# Patient Record
Sex: FEMALE | Race: WHITE | NOT HISPANIC OR LATINO | Employment: FULL TIME | URBAN - METROPOLITAN AREA
[De-identification: names, ages, dates, MRNs, and addresses within clinical notes are randomized per-mention and may not be internally consistent; named-entity substitution may affect disease eponyms.]

---

## 2018-10-11 ENCOUNTER — APPOINTMENT (OUTPATIENT)
Dept: GENERAL RADIOLOGY | Facility: HOSPITAL | Age: 33
End: 2018-10-11

## 2018-10-11 ENCOUNTER — HOSPITAL ENCOUNTER (EMERGENCY)
Facility: HOSPITAL | Age: 33
Discharge: HOME | End: 2018-10-11
Attending: EMERGENCY MEDICINE | Admitting: EMERGENCY MEDICINE

## 2018-10-11 ENCOUNTER — APPOINTMENT (OUTPATIENT)
Dept: CT IMAGING | Facility: HOSPITAL | Age: 33
End: 2018-10-11

## 2018-10-11 VITALS
BODY MASS INDEX: 18.64 KG/M2 | TEMPERATURE: 98.8 F | HEIGHT: 68 IN | HEART RATE: 79 BPM | RESPIRATION RATE: 18 BRPM | WEIGHT: 123 LBS | OXYGEN SATURATION: 95 % | SYSTOLIC BLOOD PRESSURE: 102 MMHG | DIASTOLIC BLOOD PRESSURE: 56 MMHG

## 2018-10-11 DIAGNOSIS — S16.1XXA STRAIN OF NECK MUSCLE, INITIAL ENCOUNTER: ICD-10-CM
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
## 2018-10-11 DIAGNOSIS — S50.01XA CONTUSION OF RIGHT ELBOW, INITIAL ENCOUNTER: ICD-10-CM

## 2018-10-11 DIAGNOSIS — S00.83XA CONTUSION OF FOREHEAD, INITIAL ENCOUNTER: Primary | ICD-10-CM

## 2018-10-11 PROCEDURE — 99284 EMERGENCY DEPT VISIT MOD MDM: CPT

## 2018-10-11 PROCEDURE — 25010000002 TDAP 5-2.5-18.5 LF-MCG/0.5 SUSPENSION: Performed by: PHYSICIAN ASSISTANT

## 2018-10-11 PROCEDURE — 72050 X-RAY EXAM NECK SPINE 4/5VWS: CPT

## 2018-10-11 PROCEDURE — 70450 CT HEAD/BRAIN W/O DYE: CPT

## 2018-10-11 PROCEDURE — 90715 TDAP VACCINE 7 YRS/> IM: CPT | Performed by: PHYSICIAN ASSISTANT

## 2018-10-11 PROCEDURE — 73070 X-RAY EXAM OF ELBOW: CPT

## 2018-10-11 PROCEDURE — 90471 IMMUNIZATION ADMIN: CPT | Performed by: PHYSICIAN ASSISTANT

## 2018-10-11 RX ORDER — ALBUTEROL SULFATE 90 UG/1
2 AEROSOL, METERED RESPIRATORY (INHALATION) EVERY 4 HOURS PRN
COMMUNITY
End: 2022-08-18

## 2018-10-11 RX ORDER — TRAZODONE HYDROCHLORIDE 150 MG/1
150 TABLET ORAL NIGHTLY
COMMUNITY
End: 2022-08-18

## 2018-10-11 RX ORDER — IBUPROFEN 200 MG
800 TABLET ORAL ONCE
Status: COMPLETED | OUTPATIENT
Start: 2018-10-11 | End: 2018-10-11

## 2018-10-11 RX ORDER — BUPROPION HYDROCHLORIDE 100 MG/1
100 TABLET ORAL 2 TIMES DAILY
COMMUNITY
End: 2022-08-18

## 2018-10-11 RX ADMIN — TETANUS TOXOID, REDUCED DIPHTHERIA TOXOID AND ACELLULAR PERTUSSIS VACCINE, ADSORBED 0.5 ML: 5; 2.5; 8; 8; 2.5 SUSPENSION INTRAMUSCULAR at 15:25

## 2018-10-11 RX ADMIN — IBUPROFEN 800 MG: 200 TABLET, FILM COATED ORAL at 15:24
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
## 2019-05-20 ENCOUNTER — HOSPITAL ENCOUNTER (OUTPATIENT)
Dept: GENERAL RADIOLOGY | Facility: HOSPITAL | Age: 34
Discharge: HOME | End: 2019-05-20

## 2019-05-27 ENCOUNTER — HOSPITAL ENCOUNTER (OUTPATIENT)
Dept: URGENT CARE | Facility: CLINIC | Age: 34
Discharge: HOME | End: 2019-05-27
Attending: FAMILY MEDICINE
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
## 2019-07-23 ENCOUNTER — HOSPITAL ENCOUNTER (OUTPATIENT)
Dept: GENERAL RADIOLOGY | Facility: HOSPITAL | Age: 34
Discharge: HOME | End: 2019-07-23

## 2021-10-21 ENCOUNTER — LAB (OUTPATIENT)
Dept: LAB | Facility: HOSPITAL | Age: 36
End: 2021-10-21

## 2021-10-21 ENCOUNTER — TRANSCRIBE ORDERS (OUTPATIENT)
Dept: LAB | Facility: HOSPITAL | Age: 36
End: 2021-10-21

## 2021-10-21 DIAGNOSIS — B18.2 CHRONIC HEPATITIS C WITH HEPATIC COMA (HCC): Primary | ICD-10-CM

## 2021-10-21 DIAGNOSIS — B18.2 CHRONIC HEPATITIS C WITH HEPATIC COMA (HCC): ICD-10-CM

## 2021-10-21 LAB
ALBUMIN SERPL-MCNC: 4.8 G/DL (ref 3.5–5.2)
ALBUMIN/GLOB SERPL: 1.7 G/DL
ALP SERPL-CCNC: 107 U/L (ref 39–117)
ALT SERPL W P-5'-P-CCNC: 33 U/L (ref 1–33)
ANION GAP SERPL CALCULATED.3IONS-SCNC: 7.3 MMOL/L (ref 5–15)
AST SERPL-CCNC: 19 U/L (ref 1–32)
BILIRUB SERPL-MCNC: 0.4 MG/DL (ref 0–1.2)
BUN SERPL-MCNC: 7 MG/DL (ref 6–20)
BUN/CREAT SERPL: 6 (ref 7–25)
CALCIUM SPEC-SCNC: 11.3 MG/DL (ref 8.6–10.5)
CHLORIDE SERPL-SCNC: 106 MMOL/L (ref 98–107)
CO2 SERPL-SCNC: 24.7 MMOL/L (ref 22–29)
CREAT SERPL-MCNC: 1.17 MG/DL (ref 0.57–1)
GFR SERPL CREATININE-BSD FRML MDRD: 53 ML/MIN/1.73
GLOBULIN UR ELPH-MCNC: 2.8 GM/DL
GLUCOSE SERPL-MCNC: 92 MG/DL (ref 65–99)
POTASSIUM SERPL-SCNC: 3.9 MMOL/L (ref 3.5–5.2)
PROT SERPL-MCNC: 7.6 G/DL (ref 6–8.5)
SODIUM SERPL-SCNC: 138 MMOL/L (ref 136–145)

## 2021-10-21 PROCEDURE — 36415 COLL VENOUS BLD VENIPUNCTURE: CPT

## 2021-10-21 PROCEDURE — 87522 HEPATITIS C REVRS TRNSCRPJ: CPT

## 2021-10-21 PROCEDURE — 80053 COMPREHEN METABOLIC PANEL: CPT

## 2021-10-21 PROCEDURE — 86704 HEP B CORE ANTIBODY TOTAL: CPT

## 2021-10-22 LAB — HBV CORE AB SERPL QL IA: NEGATIVE

## 2021-10-24 LAB
HCV RNA SERPL NAA+PROBE-ACNC: NORMAL IU/ML
TEST INFORMATION: NORMAL

## 2022-04-18 ENCOUNTER — LAB (OUTPATIENT)
Dept: LAB | Facility: HOSPITAL | Age: 37
End: 2022-04-18

## 2022-04-18 ENCOUNTER — TRANSCRIBE ORDERS (OUTPATIENT)
Dept: ADMINISTRATIVE | Facility: HOSPITAL | Age: 37
End: 2022-04-18

## 2022-04-18 DIAGNOSIS — Z79.899 ENCOUNTER FOR LONG-TERM (CURRENT) USE OF OTHER MEDICATIONS: Primary | ICD-10-CM

## 2022-04-18 DIAGNOSIS — Z79.899 ENCOUNTER FOR LONG-TERM (CURRENT) USE OF OTHER MEDICATIONS: ICD-10-CM

## 2022-04-18 LAB
ALBUMIN SERPL-MCNC: 4.3 G/DL (ref 3.5–5.2)
ALBUMIN/GLOB SERPL: 1.3 G/DL
ALP SERPL-CCNC: 114 U/L (ref 39–117)
ALT SERPL W P-5'-P-CCNC: 19 U/L (ref 1–33)
ANION GAP SERPL CALCULATED.3IONS-SCNC: 8.8 MMOL/L (ref 5–15)
AST SERPL-CCNC: 15 U/L (ref 1–32)
BASOPHILS # BLD AUTO: 0.05 10*3/MM3 (ref 0–0.2)
BASOPHILS NFR BLD AUTO: 0.7 % (ref 0–1.5)
BILIRUB SERPL-MCNC: 0.2 MG/DL (ref 0–1.2)
BUN SERPL-MCNC: 6 MG/DL (ref 6–20)
BUN/CREAT SERPL: 4.7 (ref 7–25)
CALCIUM SPEC-SCNC: 11.8 MG/DL (ref 8.6–10.5)
CHLORIDE SERPL-SCNC: 107 MMOL/L (ref 98–107)
CHOLEST SERPL-MCNC: 134 MG/DL (ref 0–200)
CO2 SERPL-SCNC: 24.2 MMOL/L (ref 22–29)
CREAT SERPL-MCNC: 1.27 MG/DL (ref 0.57–1)
DEPRECATED RDW RBC AUTO: 42.3 FL (ref 37–54)
EGFRCR SERPLBLD CKD-EPI 2021: 56.7 ML/MIN/1.73
EOSINOPHIL # BLD AUTO: 0.16 10*3/MM3 (ref 0–0.4)
EOSINOPHIL NFR BLD AUTO: 2.3 % (ref 0.3–6.2)
ERYTHROCYTE [DISTWIDTH] IN BLOOD BY AUTOMATED COUNT: 12.9 % (ref 12.3–15.4)
GLOBULIN UR ELPH-MCNC: 3.2 GM/DL
GLUCOSE SERPL-MCNC: 56 MG/DL (ref 65–99)
HBA1C MFR BLD: 4.8 % (ref 4.8–5.6)
HCT VFR BLD AUTO: 37.7 % (ref 34–46.6)
HDLC SERPL-MCNC: 37 MG/DL (ref 40–60)
HGB BLD-MCNC: 12.5 G/DL (ref 12–15.9)
IMM GRANULOCYTES # BLD AUTO: 0.03 10*3/MM3 (ref 0–0.05)
IMM GRANULOCYTES NFR BLD AUTO: 0.4 % (ref 0–0.5)
LDLC SERPL CALC-MCNC: 84 MG/DL (ref 0–100)
LDLC/HDLC SERPL: 2.28 {RATIO}
LITHIUM SERPL-SCNC: 0.8 MMOL/L (ref 0.6–1.2)
LYMPHOCYTES # BLD AUTO: 1.58 10*3/MM3 (ref 0.7–3.1)
LYMPHOCYTES NFR BLD AUTO: 22.6 % (ref 19.6–45.3)
MCH RBC QN AUTO: 30.2 PG (ref 26.6–33)
MCHC RBC AUTO-ENTMCNC: 33.2 G/DL (ref 31.5–35.7)
MCV RBC AUTO: 91.1 FL (ref 79–97)
MONOCYTES # BLD AUTO: 0.58 10*3/MM3 (ref 0.1–0.9)
MONOCYTES NFR BLD AUTO: 8.3 % (ref 5–12)
NEUTROPHILS NFR BLD AUTO: 4.59 10*3/MM3 (ref 1.7–7)
NEUTROPHILS NFR BLD AUTO: 65.7 % (ref 42.7–76)
NRBC BLD AUTO-RTO: 0 /100 WBC (ref 0–0.2)
PLATELET # BLD AUTO: 422 10*3/MM3 (ref 140–450)
PMV BLD AUTO: 9.1 FL (ref 6–12)
POTASSIUM SERPL-SCNC: 4.2 MMOL/L (ref 3.5–5.2)
PROT SERPL-MCNC: 7.5 G/DL (ref 6–8.5)
RBC # BLD AUTO: 4.14 10*6/MM3 (ref 3.77–5.28)
SODIUM SERPL-SCNC: 140 MMOL/L (ref 136–145)
T4 FREE SERPL-MCNC: 0.93 NG/DL (ref 0.93–1.7)
TRIGL SERPL-MCNC: 63 MG/DL (ref 0–150)
TSH SERPL DL<=0.05 MIU/L-ACNC: 5.5 UIU/ML (ref 0.27–4.2)
VLDLC SERPL-MCNC: 13 MG/DL (ref 5–40)
WBC NRBC COR # BLD: 6.99 10*3/MM3 (ref 3.4–10.8)

## 2022-04-18 PROCEDURE — 80061 LIPID PANEL: CPT

## 2022-04-18 PROCEDURE — 80053 COMPREHEN METABOLIC PANEL: CPT

## 2022-04-18 PROCEDURE — 84439 ASSAY OF FREE THYROXINE: CPT

## 2022-04-18 PROCEDURE — 85025 COMPLETE CBC W/AUTO DIFF WBC: CPT

## 2022-04-18 PROCEDURE — 83036 HEMOGLOBIN GLYCOSYLATED A1C: CPT

## 2022-04-18 PROCEDURE — 84443 ASSAY THYROID STIM HORMONE: CPT

## 2022-04-18 PROCEDURE — 80178 ASSAY OF LITHIUM: CPT

## 2022-04-18 PROCEDURE — 36415 COLL VENOUS BLD VENIPUNCTURE: CPT

## 2022-08-17 PROBLEM — F41.9 ANXIETY: Status: ACTIVE | Noted: 2021-03-11

## 2022-08-17 PROBLEM — G47.09 OTHER INSOMNIA: Status: ACTIVE | Noted: 2020-10-31

## 2022-08-17 PROBLEM — F10.10 ALCOHOL ABUSE: Status: ACTIVE | Noted: 2020-10-08

## 2022-08-17 PROBLEM — F19.10 SUBSTANCE ABUSE: Status: ACTIVE | Noted: 2020-10-08

## 2022-08-17 PROBLEM — F31.62 BIPOLAR DISORDER, CURRENT EPISODE MIXED, MODERATE: Status: ACTIVE | Noted: 2020-10-08

## 2022-08-17 PROBLEM — Z72.0 TOBACCO ABUSE: Status: ACTIVE | Noted: 2020-10-08

## 2022-08-17 PROBLEM — B18.2 CHRONIC HEPATITIS C WITHOUT HEPATIC COMA (HCC): Status: ACTIVE | Noted: 2020-10-13

## 2022-08-17 PROBLEM — F11.10 HEROIN ABUSE: Status: ACTIVE | Noted: 2020-10-08

## 2022-08-18 ENCOUNTER — OFFICE VISIT (OUTPATIENT)
Dept: FAMILY MEDICINE CLINIC | Facility: CLINIC | Age: 37
End: 2022-08-18

## 2022-08-18 ENCOUNTER — LAB (OUTPATIENT)
Dept: LAB | Facility: HOSPITAL | Age: 37
End: 2022-08-18

## 2022-08-18 VITALS
OXYGEN SATURATION: 100 % | DIASTOLIC BLOOD PRESSURE: 59 MMHG | HEIGHT: 68 IN | BODY MASS INDEX: 21.67 KG/M2 | SYSTOLIC BLOOD PRESSURE: 95 MMHG | HEART RATE: 69 BPM | WEIGHT: 143 LBS

## 2022-08-18 DIAGNOSIS — N92.6 MISSED MENSES: Primary | ICD-10-CM

## 2022-08-18 DIAGNOSIS — N92.6 MISSED MENSES: ICD-10-CM

## 2022-08-18 DIAGNOSIS — Z76.89 ENCOUNTER TO ESTABLISH CARE: ICD-10-CM

## 2022-08-18 DIAGNOSIS — Z34.90 PREGNANCY, UNSPECIFIED GESTATIONAL AGE: ICD-10-CM

## 2022-08-18 LAB
B-HCG UR QL: POSITIVE
EXPIRATION DATE: ABNORMAL
HCG INTACT+B SERPL-ACNC: NORMAL MIU/ML
HCG SERPL QL: POSITIVE
INTERNAL NEGATIVE CONTROL: ABNORMAL
INTERNAL POSITIVE CONTROL: ABNORMAL
Lab: ABNORMAL

## 2022-08-18 PROCEDURE — 84703 CHORIONIC GONADOTROPIN ASSAY: CPT

## 2022-08-18 PROCEDURE — 99203 OFFICE O/P NEW LOW 30 MIN: CPT | Performed by: NURSE PRACTITIONER

## 2022-08-18 PROCEDURE — 36415 COLL VENOUS BLD VENIPUNCTURE: CPT

## 2022-08-18 PROCEDURE — 84702 CHORIONIC GONADOTROPIN TEST: CPT

## 2022-08-18 PROCEDURE — 81025 URINE PREGNANCY TEST: CPT | Performed by: NURSE PRACTITIONER

## 2022-08-18 RX ORDER — BUPROPION HYDROCHLORIDE 150 MG/1
150 TABLET ORAL DAILY
COMMUNITY
Start: 2022-07-26 | End: 2022-10-11

## 2022-08-18 RX ORDER — BUPROPION HYDROCHLORIDE 300 MG/1
300 TABLET ORAL DAILY
COMMUNITY
Start: 2022-07-26 | End: 2022-10-11

## 2022-08-18 NOTE — PROGRESS NOTES
Chief Complaint  Amenorrhea, depression    Subjective            Aubree Krueger presents to [facility]  Pt is here to establish care. Previous PCP was a MD in [place], she will sign for records today.    Pt has c/o possible pregnancy. Pt states her LMP was mid to end of June. Pt has taken pregnancy tests at home and were positive. Pt would like a referral to OBGYN.    Pt sees [facility] for PSYCH and med mgmt.    Had history of  Hep C and pt reports she has finished treatment at [facility] after being there for about 6 months.  Notes in chart.  PT denies any other significant past medical history.    She reports two prior pregnancies and two live births.        Past Medical History:   Diagnosis Date   • Depression    • Substance abuse ([facility]) 2007       Allergies   Allergen Reactions   • Sulfa Antibiotics Anaphylaxis        Past Surgical History:   Procedure Laterality Date   • KNEE SURGERY Left     2 surgeries        Social History     Tobacco Use   • Smoking status: Current Every Day Smoker     Packs/day: 1.00     Years: 10.00     Pack years: 10.00     Types: Cigarettes   • Smokeless tobacco: Never Used   • Tobacco comment: Workin on quittting   Substance Use Topics   • Alcohol use: No       No family history on file.     Current Outpatient Medications on File Prior to Visit   Medication Sig   • buPROPion XL (WELLBUTRIN XL) 150 MG 24 hr tablet Take 150 mg by mouth Daily.   • buPROPion XL (WELLBUTRIN XL) 300 MG 24 hr tablet Take 300 mg by mouth Daily.   • [DISCONTINUED] albuterol (PROVENTIL HFA;VENTOLIN HFA) 108 (90 Base) MCG/ACT inhaler Inhale 2 puffs Every 4 (Four) Hours As Needed for Wheezing.   • [DISCONTINUED] buPROPion (WELLBUTRIN) 100 MG tablet Take 100 mg by mouth 2 (Two) Times a Day.   • [DISCONTINUED] traZODone (DESYREL) 150 MG tablet Take 150 mg by mouth Every Night.     No current facility-administered medications on file prior to visit.       [facility]  "Maintenance Due   Topic Date Due   • ANNUAL PHYSICAL  Never done       Objective     BP 95/59   Pulse 69   Ht 172.7 cm (68\")   Wt 64.9 kg (143 lb)   SpO2 100%   BMI 21.74 kg/m²       Physical Exam  Constitutional:       General: She is not in acute distress.     Appearance: Normal appearance. She is not ill-appearing.   HENT:      Head: Normocephalic and atraumatic.   Cardiovascular:      Rate and Rhythm: Normal rate and regular rhythm.      Heart sounds: Normal heart sounds. No murmur heard.  Pulmonary:      Effort: Pulmonary effort is normal. No respiratory distress.      Breath sounds: Normal breath sounds.   Chest:      Chest wall: No tenderness.   Abdominal:      General: Abdomen is flat. Bowel sounds are normal. There is no distension.      Palpations: Abdomen is soft. There is no mass.      Tenderness: There is no abdominal tenderness. There is no guarding.   Musculoskeletal:         General: No swelling or tenderness. Normal range of motion.      Cervical back: Normal range of motion and neck supple.   Skin:     General: Skin is warm and dry.      Findings: No rash.   Neurological:      General: No focal deficit present.      Mental Status: She is alert and oriented to person, place, and time. Mental status is at baseline.      Gait: Gait normal.   Psychiatric:         Mood and Affect: Mood normal.         Behavior: Behavior normal.         Thought Content: Thought content normal.         Judgment: Judgment normal.           Result Review :                           Assessment and Plan        Diagnoses and all orders for this visit:    1. Missed menses (Primary)  -     POCT pregnancy, urine  -     hCG, Quantitative, Pregnancy; Future  -     hCG, Serum, Qualitative; Future  -     Ambulatory Referral to Gynecology    2. Encounter to establish care    3. Pregnancy, unspecified gestational age  Comments:  advised  pt to start prenatal vitamins  Orders:  -     Ambulatory Referral to " Gynecology              Follow Up     Return in about 1 year (around 8/18/2023), or if symptoms worsen or fail to improve.    Patient was given instructions and counseling regarding her condition or for health maintenance advice. Please see specific information pulled into the AVS if appropriate.

## 2022-08-24 ENCOUNTER — PATIENT ROUNDING (BHMG ONLY) (OUTPATIENT)
Dept: FAMILY MEDICINE CLINIC | Facility: CLINIC | Age: 37
End: 2022-08-24

## 2022-08-24 NOTE — PROGRESS NOTES
A [employer] message has been sent to the patient for PATIENT ROUNDING with [facility].

## 2022-09-13 ENCOUNTER — PATIENT ROUNDING (BHMG ONLY) (OUTPATIENT)
Dept: OBSTETRICS AND GYNECOLOGY | Age: 37
End: 2022-09-13

## 2022-09-13 ENCOUNTER — OFFICE VISIT (OUTPATIENT)
Dept: OBSTETRICS AND GYNECOLOGY | Age: 37
End: 2022-09-13

## 2022-09-13 VITALS
WEIGHT: 139 LBS | HEIGHT: 68 IN | BODY MASS INDEX: 21.07 KG/M2 | SYSTOLIC BLOOD PRESSURE: 122 MMHG | DIASTOLIC BLOOD PRESSURE: 70 MMHG

## 2022-09-13 DIAGNOSIS — F11.10 HEROIN ABUSE: ICD-10-CM

## 2022-09-13 DIAGNOSIS — O09.529 ANTEPARTUM MULTIGRAVIDA OF ADVANCED MATERNAL AGE: ICD-10-CM

## 2022-09-13 DIAGNOSIS — F10.10 ALCOHOL ABUSE: Primary | ICD-10-CM

## 2022-09-13 DIAGNOSIS — B18.2 CHRONIC HEPATITIS C WITHOUT HEPATIC COMA: ICD-10-CM

## 2022-09-13 DIAGNOSIS — F41.9 ANXIETY: ICD-10-CM

## 2022-09-13 DIAGNOSIS — Z72.0 TOBACCO ABUSE: ICD-10-CM

## 2022-09-13 LAB
BILIRUB BLD-MCNC: NEGATIVE MG/DL
CLARITY, POC: CLEAR
COLOR UR: YELLOW
GLUCOSE UR STRIP-MCNC: NEGATIVE MG/DL
KETONES UR QL: NEGATIVE
LEUKOCYTE EST, POC: ABNORMAL
NITRITE UR-MCNC: NEGATIVE MG/ML
PH UR: 6.5 [PH] (ref 5–8)
PROT UR STRIP-MCNC: NEGATIVE MG/DL
RBC # UR STRIP: NEGATIVE /UL
SP GR UR: 1.01 (ref 1–1.03)
UROBILINOGEN UR QL: NORMAL

## 2022-09-13 PROCEDURE — 99204 OFFICE O/P NEW MOD 45 MIN: CPT | Performed by: OBSTETRICS & GYNECOLOGY

## 2022-09-13 RX ORDER — PRENATAL VIT NO.126/IRON/FOLIC 28MG-0.8MG
TABLET ORAL DAILY
COMMUNITY

## 2022-09-13 NOTE — PROGRESS NOTES
Subjective       History of Present Illness  Aubree Krueger is a 37 y.o. female is being seen today for irregular menses possible pregnancy  Chief Complaint   Patient presents with   • Initial Prenatal Visit     NOB:lmp ?  U/s today,EDC 4/5/23   .        The following portions of the patient's history were reviewed and updated as appropriate: allergies, current medications, past family history, past medical history, past social history, past surgical history and problem list.    PAST MEDICAL HISTORY  Past Medical History:   Diagnosis Date   • Depression    • Substance abuse (HCC) 2007     OB History   No obstetric history on file.     Past Surgical History:   Procedure Laterality Date   • KNEE SURGERY Left     2 surgeries     No family history on file.  Social History     Tobacco Use   Smoking Status Current Every Day Smoker   • Packs/day: 1.00   • Years: 10.00   • Pack years: 10.00   • Types: Cigarettes   Smokeless Tobacco Never Used   Tobacco Comment    Workin on quittting       Current Outpatient Medications:   •  prenatal vitamin (prenatal, CLASSIC, vitamin) tablet, Take  by mouth Daily., Disp: , Rfl:   •  buPROPion XL (WELLBUTRIN XL) 150 MG 24 hr tablet, Take 150 mg by mouth Daily., Disp: , Rfl:   •  buPROPion XL (WELLBUTRIN XL) 300 MG 24 hr tablet, Take 300 mg by mouth Daily., Disp: , Rfl:   Immunization History   Administered Date(s) Administered   • COVID-19 (MODERNA) 1st, 2nd, 3rd Dose Only 03/18/2021, 04/15/2021   • Fluzone Quad >6mos (Multi-dose) 11/19/2020   • Hepatitis A 12/31/2020   • PPD Test 10/12/2020   • Tdap 10/11/2018       Review of Systems       Except as outlined in history of physical illness, patient denies any changes in her GYN, , GI systems. All other systems reviewed are negative.    Objective   Physical Exam   Alert and oriented, respirations unlabored, heart regular rate and rhythm   Pelvic external genitalia normal female vagina clean dry intact cervix without discharge nontender  uterus 10 to 11 weeks size adnexa nonenlarged nontender      Assessment & Plan   Diagnoses and all orders for this visit:    1. Alcohol abuse (Primary)  -     OB Panel With HIV  -     Progesterone  -     HCG, B-subunit, Quantitative  -     Hepatitis C Antibody  -     Hepatitis C RNA, Quantitative, PCR (graph)  -     IGP,CtNgTv,rfx Aptima HPV ASCU  -     Urine Culture - Urine, Urine, Clean Catch  -     POC Urinalysis Dipstick    2. Anxiety  -     OB Panel With HIV  -     Progesterone  -     HCG, B-subunit, Quantitative  -     Hepatitis C Antibody  -     Hepatitis C RNA, Quantitative, PCR (graph)  -     IGP,CtNgTv,rfx Aptima HPV ASCU  -     Urine Culture - Urine, Urine, Clean Catch  -     POC Urinalysis Dipstick    3. Chronic hepatitis C without hepatic coma (HCC)  -     OB Panel With HIV  -     Progesterone  -     HCG, B-subunit, Quantitative  -     Hepatitis C Antibody  -     Hepatitis C RNA, Quantitative, PCR (graph)  -     IGP,CtNgTv,rfx Aptima HPV ASCU  -     Urine Culture - Urine, Urine, Clean Catch  -     POC Urinalysis Dipstick    4. Heroin abuse (HCC)  -     OB Panel With HIV  -     Progesterone  -     HCG, B-subunit, Quantitative  -     Hepatitis C Antibody  -     Hepatitis C RNA, Quantitative, PCR (graph)  -     IGP,CtNgTv,rfx Aptima HPV ASCU  -     Urine Culture - Urine, Urine, Clean Catch  -     POC Urinalysis Dipstick    5. Tobacco abuse  -     OB Panel With HIV  -     Progesterone  -     HCG, B-subunit, Quantitative  -     Hepatitis C Antibody  -     Hepatitis C RNA, Quantitative, PCR (graph)  -     IGP,CtNgTv,rfx Aptima HPV ASCU  -     Urine Culture - Urine, Urine, Clean Catch  -     POC Urinalysis Dipstick    6. Antepartum multigravida of advanced maternal age  -     OB Panel With HIV  -     Progesterone  -     HCG, B-subunit, Quantitative  -     Hepatitis C Antibody  -     Hepatitis C RNA, Quantitative, PCR (graph)  -     IGP,CtNgTv,rfx Aptima HPV ASCU  -     Urine Culture - Urine, Urine, Clean  Catch  -     POC Urinalysis Dipstick    Today's ultrasound consistent with 10 weeks 6 days gestation  Positive cardiac activity 165 beats a minute  Reviewed multiple medical issues concerns treatments and abstinence from illicit drugs as well as tobacco cessation-patient states she has been clean and sober for 2 years  Previous hep C RNA load was negative we will recheck again today  BiPolar disorder managed by Dr. Elizabeth Davey in [facility] I have recommended follow-up appointment as patient has not been on medicines for couple months  Patient voices understanding               Orders Placed This Encounter   Procedures   • Urine Culture - Urine, Urine, Clean Catch   • OB Panel With HIV     Order Specific Question:   Release to patient     Answer:   Routine Release   • Progesterone     Order Specific Question:   Release to patient     Answer:   Routine Release   • HCG, B-subunit, Quantitative     Order Specific Question:   Release to patient     Answer:   Routine Release   • Hepatitis C Antibody     Order Specific Question:   Release to patient     Answer:   Routine Release   • Hepatitis C RNA, Quantitative, PCR (graph)     Order Specific Question:   Release to patient     Answer:   Routine Release   • POC Urinalysis Dipstick     Order Specific Question:   Release to patient     Answer:   Routine Release           EMR Dragon/ Transcription disclaimer:  Much of the encounter note is an electronic transcription/translation of spoken language to printed text. The electronic translation of spoken language may permit erroneous, or at times, nonessential words or phrases to be inadvertently transcribes; Although i have reviewed the note for such errors, some may still exist.

## 2022-09-15 ENCOUNTER — TELEPHONE (OUTPATIENT)
Dept: OBSTETRICS AND GYNECOLOGY | Age: 37
End: 2022-09-15

## 2022-09-15 LAB
A VAGINAE DNA VAG QL NAA+PROBE: ABNORMAL SCORE
BACTERIA UR CULT: NO GROWTH
BACTERIA UR CULT: NORMAL
BVAB2 DNA VAG QL NAA+PROBE: ABNORMAL SCORE
C ALBICANS DNA VAG QL NAA+PROBE: NEGATIVE
C GLABRATA DNA VAG QL NAA+PROBE: NEGATIVE
C TRACH DNA VAG QL NAA+PROBE: NEGATIVE
MEGA1 DNA VAG QL NAA+PROBE: ABNORMAL SCORE
N GONORRHOEA DNA VAG QL NAA+PROBE: NEGATIVE
T VAGINALIS DNA VAG QL NAA+PROBE: NEGATIVE

## 2022-09-15 RX ORDER — METRONIDAZOLE 7.5 MG/G
GEL VAGINAL DAILY
Qty: 70 G | Refills: 0 | Status: SHIPPED | OUTPATIENT
Start: 2022-09-15 | End: 2022-10-11

## 2022-09-15 NOTE — PROGRESS NOTES
Cultures show and very common consistent with some bacterial vaginosis, medication has been sent. everything else appears normal

## 2022-09-15 NOTE — TELEPHONE ENCOUNTER
----- Message from Vasiliy Mullins MD sent at 9/15/2022 12:18 PM EDT -----  Cultures show and very common consistent with some bacterial vaginosis, medication has been sent. everything else appears normal

## 2022-09-17 LAB
ABO GROUP BLD: ABNORMAL
BASOPHILS # BLD AUTO: 0 X10E3/UL (ref 0–0.2)
BASOPHILS NFR BLD AUTO: 0 %
BLD GP AB SCN SERPL QL: NEGATIVE
CFDNA.FET/CFDNA.TOTAL SFR FETUS: NORMAL %
CITATION REF LAB TEST: NORMAL
EOSINOPHIL # BLD AUTO: 0.1 X10E3/UL (ref 0–0.4)
EOSINOPHIL NFR BLD AUTO: 1 %
ERYTHROCYTE [DISTWIDTH] IN BLOOD BY AUTOMATED COUNT: 14.1 % (ref 11.7–15.4)
FET 13+18+21+X+Y ANEUP PLAS.CFDNA: NEGATIVE
FET CHR 21 TS PLAS.CFDNA QL: NEGATIVE
FET SEX PLAS.CFDNA DOSAGE CFDNA: NORMAL
FET TS 13 RISK PLAS.CFDNA QL: NEGATIVE
FET TS 18 RISK WBC.DNA+CFDNA QL: NEGATIVE
GA EST FROM CONCEPTION DATE: NORMAL D
GESTATIONAL AGE > 9:: YES
HBV SURFACE AG SERPL QL IA: NEGATIVE
HCG INTACT+B SERPL-ACNC: NORMAL MIU/ML
HCT VFR BLD AUTO: 39.3 % (ref 34–46.6)
HCV AB S/CO SERPL IA: >11 S/CO RATIO (ref 0–0.9)
HCV RNA SERPL NAA+PROBE-ACNC: NORMAL IU/ML
HGB BLD-MCNC: 13.1 G/DL (ref 11.1–15.9)
HIV 1+2 AB+HIV1 P24 AG SERPL QL IA: NON REACTIVE
IMM GRANULOCYTES # BLD AUTO: 0 X10E3/UL (ref 0–0.1)
IMM GRANULOCYTES NFR BLD AUTO: 0 %
LAB DIRECTOR NAME PROVIDER: NORMAL
LAB DIRECTOR NAME PROVIDER: NORMAL
LABORATORY COMMENT REPORT: NORMAL
LIMITATIONS OF THE TEST: NORMAL
LYMPHOCYTES # BLD AUTO: 1.3 X10E3/UL (ref 0.7–3.1)
LYMPHOCYTES NFR BLD AUTO: 17 %
MCH RBC QN AUTO: 30.5 PG (ref 26.6–33)
MCHC RBC AUTO-ENTMCNC: 33.3 G/DL (ref 31.5–35.7)
MCV RBC AUTO: 92 FL (ref 79–97)
MONOCYTES # BLD AUTO: 0.4 X10E3/UL (ref 0.1–0.9)
MONOCYTES NFR BLD AUTO: 5 %
NEGATIVE PREDICTIVE VALUE: NORMAL
NEUTROPHILS # BLD AUTO: 5.6 X10E3/UL (ref 1.4–7)
NEUTROPHILS NFR BLD AUTO: 77 %
NOTE: NORMAL
PERFORMANCE CHARACTERISTICS: NORMAL
PLATELET # BLD AUTO: 250 X10E3/UL (ref 150–450)
POSITIVE PREDICTIVE VALUE: NORMAL
PROGEST SERPL-MCNC: 17 NG/ML
RBC # BLD AUTO: 4.29 X10E6/UL (ref 3.77–5.28)
REF LAB TEST METHOD: NORMAL
RH BLD: POSITIVE
RPR SER QL: NON REACTIVE
RUBV IGG SERPL IA-ACNC: 5.82 INDEX
TEST INFORMATION: NORMAL
TEST PERFORMANCE INFO SPEC: NORMAL
WBC # BLD AUTO: 7.4 X10E3/UL (ref 3.4–10.8)

## 2022-09-19 LAB
C TRACH RRNA CVX QL NAA+PROBE: NEGATIVE
CONV .: NORMAL
CYTOLOGIST CVX/VAG CYTO: NORMAL
CYTOLOGY CVX/VAG DOC CYTO: NORMAL
CYTOLOGY CVX/VAG DOC THIN PREP: NORMAL
DX ICD CODE: NORMAL
HIV 1 & 2 AB SER-IMP: NORMAL
Lab: NORMAL
N GONORRHOEA RRNA CVX QL NAA+PROBE: NEGATIVE
OTHER STN SPEC: NORMAL
STAT OF ADQ CVX/VAG CYTO-IMP: NORMAL
T VAGINALIS RRNA SPEC QL NAA+PROBE: NEGATIVE

## 2022-10-11 ENCOUNTER — INITIAL PRENATAL (OUTPATIENT)
Dept: OBSTETRICS AND GYNECOLOGY | Age: 37
End: 2022-10-11

## 2022-10-11 VITALS — DIASTOLIC BLOOD PRESSURE: 62 MMHG | WEIGHT: 139 LBS | BODY MASS INDEX: 21.13 KG/M2 | SYSTOLIC BLOOD PRESSURE: 100 MMHG

## 2022-10-11 DIAGNOSIS — O98.419 CHRONIC HEPATITIS C COMPLICATING PREGNANCY, ANTEPARTUM: ICD-10-CM

## 2022-10-11 DIAGNOSIS — F31.62 BIPOLAR DISORDER, CURRENT EPISODE MIXED, MODERATE: ICD-10-CM

## 2022-10-11 DIAGNOSIS — F19.11 HISTORY OF SUBSTANCE ABUSE: ICD-10-CM

## 2022-10-11 DIAGNOSIS — B18.2 CHRONIC HEPATITIS C COMPLICATING PREGNANCY, ANTEPARTUM: ICD-10-CM

## 2022-10-11 DIAGNOSIS — Z34.81 PRENATAL CARE, SUBSEQUENT PREGNANCY, FIRST TRIMESTER: Primary | ICD-10-CM

## 2022-10-11 DIAGNOSIS — O09.529 ANTEPARTUM MULTIGRAVIDA OF ADVANCED MATERNAL AGE: ICD-10-CM

## 2022-10-11 DIAGNOSIS — B18.2 CHRONIC HEPATITIS C WITHOUT HEPATIC COMA: ICD-10-CM

## 2022-10-11 LAB
CLARITY, POC: CLEAR
COLOR UR: YELLOW
GLUCOSE UR STRIP-MCNC: NEGATIVE MG/DL
PROT UR STRIP-MCNC: NEGATIVE MG/DL

## 2022-10-11 PROCEDURE — 99213 OFFICE O/P EST LOW 20 MIN: CPT | Performed by: OBSTETRICS & GYNECOLOGY

## 2022-10-11 NOTE — PROGRESS NOTES
Chief Complaint   Patient presents with   • Routine Prenatal Visit     HPI- Pt is 37 y.o.  at 14w6d here for prenatal visit.     ROS-     - No vaginal bleeding    GI- No abdominal pain    /62   Wt 63 kg (139 lb)   LMP  (LMP Unknown)   BMI 21.13 kg/m²   Exam - See flow sheet    Fetal heart rate is normal    Assessment-  Diagnoses and all orders for this visit:    Prenatal care, subsequent pregnancy, first trimester  -     POC Urinalysis Dipstick    Antepartum multigravida of advanced maternal age  -     [facility]; Future    Chronic hepatitis C complicating pregnancy, antepartum (HCC)  -     [facility]; Future  Consult with MFM scheduled patient asymptomatic, quantitative measurement increased to 100 and 5K,  Bipolar disorder, current episode mixed, moderate (HCC)  Patient sees Dr. Elizabeth Davey for management  Chronic hepatitis C without hepatic coma (HCC)  As above  History of substance abuse (HCC)  Sober for over 2-1/2 years    Anatomy scan in 4 weeks, cell free DNA 46 XX, consistent with female, limitations of cell free DNA reviewed  Discussed nutrition and exercise.

## 2022-10-13 ENCOUNTER — TELEPHONE (OUTPATIENT)
Dept: OBSTETRICS AND GYNECOLOGY | Age: 37
End: 2022-10-13

## 2022-10-13 NOTE — TELEPHONE ENCOUNTER
Link pt  15 week ob c/o feeling lightheaded.  She said she woke up at 6 and felt lightheaded so she went back to bed and when she woke up the next time she felt the same way.  So she thought maybe she needed to eat so she has done that and she still feels the same.  Wants to know what to do.

## 2022-10-18 ENCOUNTER — HOSPITAL ENCOUNTER (EMERGENCY)
Facility: HOSPITAL | Age: 37
Discharge: LEFT BEFORE DISCHARGE | End: 2022-10-19

## 2022-10-18 VITALS
DIASTOLIC BLOOD PRESSURE: 68 MMHG | TEMPERATURE: 98.1 F | RESPIRATION RATE: 16 BRPM | HEART RATE: 68 BPM | WEIGHT: 138.67 LBS | HEIGHT: 68 IN | SYSTOLIC BLOOD PRESSURE: 107 MMHG | OXYGEN SATURATION: 100 % | BODY MASS INDEX: 21.02 KG/M2

## 2022-10-18 LAB
ABO GROUP BLD: NORMAL
BASOPHILS # BLD AUTO: 0.06 10*3/MM3 (ref 0–0.2)
BASOPHILS NFR BLD AUTO: 0.5 % (ref 0–1.5)
BLD GP AB SCN SERPL QL: NEGATIVE
DEPRECATED RDW RBC AUTO: 46.3 FL (ref 37–54)
EOSINOPHIL # BLD AUTO: 0.13 10*3/MM3 (ref 0–0.4)
EOSINOPHIL NFR BLD AUTO: 1.1 % (ref 0.3–6.2)
ERYTHROCYTE [DISTWIDTH] IN BLOOD BY AUTOMATED COUNT: 13.7 % (ref 12.3–15.4)
HCG INTACT+B SERPL-ACNC: NORMAL MIU/ML
HCT VFR BLD AUTO: 35.9 % (ref 34–46.6)
HGB BLD-MCNC: 12 G/DL (ref 12–15.9)
HOLD SPECIMEN: NORMAL
HOLD SPECIMEN: NORMAL
IMM GRANULOCYTES # BLD AUTO: 0.04 10*3/MM3 (ref 0–0.05)
IMM GRANULOCYTES NFR BLD AUTO: 0.3 % (ref 0–0.5)
LIPASE SERPL-CCNC: 28 U/L (ref 13–60)
LYMPHOCYTES # BLD AUTO: 2.08 10*3/MM3 (ref 0.7–3.1)
LYMPHOCYTES NFR BLD AUTO: 17.4 % (ref 19.6–45.3)
MCH RBC QN AUTO: 30.8 PG (ref 26.6–33)
MCHC RBC AUTO-ENTMCNC: 33.4 G/DL (ref 31.5–35.7)
MCV RBC AUTO: 92.1 FL (ref 79–97)
MONOCYTES # BLD AUTO: 0.52 10*3/MM3 (ref 0.1–0.9)
MONOCYTES NFR BLD AUTO: 4.4 % (ref 5–12)
NEUTROPHILS NFR BLD AUTO: 76.3 % (ref 42.7–76)
NEUTROPHILS NFR BLD AUTO: 9.12 10*3/MM3 (ref 1.7–7)
NRBC BLD AUTO-RTO: 0 /100 WBC (ref 0–0.2)
PLATELET # BLD AUTO: 276 10*3/MM3 (ref 140–450)
PMV BLD AUTO: 10 FL (ref 6–12)
RBC # BLD AUTO: 3.9 10*6/MM3 (ref 3.77–5.28)
RH BLD: POSITIVE
T&S EXPIRATION DATE: NORMAL
WBC NRBC COR # BLD: 11.95 10*3/MM3 (ref 3.4–10.8)
WHOLE BLOOD HOLD COAG: NORMAL
WHOLE BLOOD HOLD SPECIMEN: NORMAL

## 2022-10-18 PROCEDURE — 36415 COLL VENOUS BLD VENIPUNCTURE: CPT

## 2022-10-18 PROCEDURE — 99211 OFF/OP EST MAY X REQ PHY/QHP: CPT

## 2022-10-18 PROCEDURE — 86901 BLOOD TYPING SEROLOGIC RH(D): CPT

## 2022-10-18 PROCEDURE — 86850 RBC ANTIBODY SCREEN: CPT

## 2022-10-18 PROCEDURE — 85025 COMPLETE CBC W/AUTO DIFF WBC: CPT

## 2022-10-18 PROCEDURE — 84702 CHORIONIC GONADOTROPIN TEST: CPT

## 2022-10-18 PROCEDURE — 86900 BLOOD TYPING SEROLOGIC ABO: CPT

## 2022-10-18 PROCEDURE — 83690 ASSAY OF LIPASE: CPT

## 2022-10-18 RX ORDER — SODIUM CHLORIDE 0.9 % (FLUSH) 0.9 %
10 SYRINGE (ML) INJECTION AS NEEDED
Status: DISCONTINUED | OUTPATIENT
Start: 2022-10-18 | End: 2022-10-19 | Stop reason: HOSPADM

## 2022-10-19 ENCOUNTER — APPOINTMENT (OUTPATIENT)
Dept: ULTRASOUND IMAGING | Facility: HOSPITAL | Age: 37
End: 2022-10-19

## 2022-10-19 NOTE — ED TRIAGE NOTES
"Pt to ED from home with reports of being 15 weeks pregnant and feeling \"a lot of stretching and tearing\" in abdomen today.      Pt states she has been coughing a lot, and was seen at Urgent Care two days ago and had negative covid test.    "

## 2022-11-10 ENCOUNTER — TELEPHONE (OUTPATIENT)
Dept: OBSTETRICS AND GYNECOLOGY | Age: 37
End: 2022-11-10

## 2022-11-10 PROBLEM — U07.1 COVID-19 VIRUS INFECTION: Status: ACTIVE | Noted: 2022-11-10

## 2022-11-10 NOTE — TELEPHONE ENCOUNTER
Pt wanted to notify you she is COVID positive, pt was notified to stay hydrated and call office with any severe symptoms

## 2022-11-10 NOTE — TELEPHONE ENCOUNTER
Called pt back after she paged MD on call. She notes she tested positive for covid and was just calling to let her OB know. She denies soa or persistent fever or n/v currently. She has some congestion currently. Discussed supportive care and advised her to proceed to ER with any soa, persistent fever or constant n/v. Discussed starting baby asa and recommend she consider. She also plans to discuss with her OB.

## 2022-11-14 ENCOUNTER — OFFICE VISIT (OUTPATIENT)
Dept: OBSTETRICS AND GYNECOLOGY | Facility: CLINIC | Age: 37
End: 2022-11-14

## 2022-11-14 ENCOUNTER — HOSPITAL ENCOUNTER (OUTPATIENT)
Dept: ULTRASOUND IMAGING | Facility: HOSPITAL | Age: 37
Discharge: HOME | End: 2022-11-14
Admitting: OBSTETRICS & GYNECOLOGY

## 2022-11-14 VITALS
HEART RATE: 91 BPM | SYSTOLIC BLOOD PRESSURE: 99 MMHG | DIASTOLIC BLOOD PRESSURE: 66 MMHG | WEIGHT: 139 LBS | TEMPERATURE: 97.8 F | BODY MASS INDEX: 21.13 KG/M2

## 2022-11-14 DIAGNOSIS — B18.2 CHRONIC HEPATITIS C WITHOUT HEPATIC COMA: ICD-10-CM

## 2022-11-14 DIAGNOSIS — B18.2 CHRONIC HEPATITIS C COMPLICATING PREGNANCY, ANTEPARTUM: ICD-10-CM

## 2022-11-14 DIAGNOSIS — F19.11 HISTORY OF SUBSTANCE ABUSE: ICD-10-CM

## 2022-11-14 DIAGNOSIS — F31.62 BIPOLAR DISORDER, CURRENT EPISODE MIXED, MODERATE: Primary | ICD-10-CM

## 2022-11-14 DIAGNOSIS — O98.419 CHRONIC HEPATITIS C COMPLICATING PREGNANCY, ANTEPARTUM: ICD-10-CM

## 2022-11-14 DIAGNOSIS — O09.529 ANTEPARTUM MULTIGRAVIDA OF ADVANCED MATERNAL AGE: ICD-10-CM

## 2022-11-14 PROCEDURE — 99213 OFFICE O/P EST LOW 20 MIN: CPT | Performed by: OBSTETRICS & GYNECOLOGY

## 2022-11-14 PROCEDURE — 76817 TRANSVAGINAL US OBSTETRIC: CPT | Performed by: OBSTETRICS & GYNECOLOGY

## 2022-11-14 PROCEDURE — 76811 OB US DETAILED SNGL FETUS: CPT | Performed by: OBSTETRICS & GYNECOLOGY

## 2022-11-14 PROCEDURE — 76817 TRANSVAGINAL US OBSTETRIC: CPT

## 2022-11-14 PROCEDURE — 76811 OB US DETAILED SNGL FETUS: CPT

## 2022-11-14 NOTE — PROGRESS NOTES
MATERNAL FETAL MEDICINE Consult Note    Dear Dr Vasiliy Mullins MD:    Thank you for your kind referral of Aubree Krueger.  As you know, she is a 37 y.o.   at  19 5/7 weeks gestation (Estimated Date of Delivery: 23). This is a consult    Her antepartum course is complicated by:  Hx Hep C, neg viral load    Aneuploidy Screening:low risk cell free DNA    HPI: Today, she denies headache, blurry vision, RUQ pain. No vaginal bleeding, no contractions.     Review of History:  Past Medical History:   Diagnosis Date   • Depression    • Hepatitis C     finished tx    • Substance abuse (HCC)      Past Surgical History:   Procedure Laterality Date   • KNEE SURGERY Left     2 surgeries   • WISDOM TOOTH EXTRACTION           Social History     Socioeconomic History   • Marital status: Single   Tobacco Use   • Smoking status: Every Day     Packs/day: 0.50     Years: 10.00     Pack years: 5.00     Types: Cigarettes   • Smokeless tobacco: Never   • Tobacco comments:     Workin on quittting   Vaping Use   • Vaping Use: Former   • Quit date: 2022   • Substances: Nicotine   Substance and Sexual Activity   • Alcohol use: No   • Drug use: Not Currently     Types: Marijuana   • Sexual activity: Yes     Partners: Male     Birth control/protection: None     Family History   Problem Relation Age of Onset   • Breast cancer Paternal Aunt       Allergies   Allergen Reactions   • Sulfa Antibiotics Anaphylaxis      Current Outpatient Medications on File Prior to Visit   Medication Sig Dispense Refill   • prenatal vitamin (prenatal, CLASSIC, vitamin) tablet Take  by mouth Daily.       No current facility-administered medications on file prior to visit.        Past obstetric, gynecological, medical, surgical, family and social history reviewed.  Relevant lab work and imaging reviewed.    Review of systems  Constitutional:  denies fever, chills, malaise.   ENT/Mouth:  denies sore throat, tinnitis  Eyes: denies vision  changes/pain  CV:  denies chest pain  Respiratory:  denies cough/SOB  GI:  denies N/V, diarrhea, abdominal pain.    :   denies dysuria  Skin:  denies lesions or pruritis   Neuro:  denies weakness, focal neurologic symptoms    Vitals:    22 1001   BP: 99/66   BP Location: Right arm   Patient Position: Sitting   Pulse: 91   Temp: 97.8 °F (36.6 °C)   TempSrc: Oral   Weight: 63 kg (139 lb)       PHYSICAL EXAM   GENERAL: Not in acute distress, AAOx3, pleasant  CARDIO: regular rate and rhythm  PULM: symmetric chest rise, speaking in complete sentences without difficulty  NEURO: awake, alert and oriented to person, place, and time  ABDOMINAL: No fundal tenderness, no rebound or guarding, gravid  EXTREMITIES: no bilateral lower extremity edema/tenderness  SKIN: Warm, well-perfused      ULTRASOUND   Please view full ultrasound note on Imaging tab in [employer].      ASSESSMENT/COUNSELIN y.o. G  P  at   /7 weeks gestation (Estimated Date of Delivery: 23)    -Pregnancy  [ X ] stable  [   ] improving [  ] worsening    Diagnoses and all orders for this visit:    1. Bipolar disorder, current episode mixed, moderate (HCC) (Primary)  Overview:  Last Assessment & Plan:   Formatting of this note might be different from the original.  increase Risperdal 2mg  Managed by Dr. Elizabeth Ramirez in [facility]-has been off medications for several months.  Recommend appointment      2. Chronic hepatitis C without hepatic coma (HCC)  Overview:  Treated [facility] in [place] last viral load negative      3. History of substance abuse (HCC)  Overview:  Clean and sober for over 2 years             Bipolar disorder  Was on resperdol prior to pregancy.  Denies SI/HI and doing well off of it right now per patient.  She see's a doctor in [facility] [address] but has not seen them recently--encouraged to reach out as often mood disorders flair postpartum.      Women with uncontrolled bipolar have been shown to have worse pregnancy outcomes and are  less likely to be compliant with their care, so if she needs a medication, resperdol is reasonable. Lamotrigine is firstline in pregnancy but patient says she had jitteriness/side effects with it and tolerated risperidone better.      Reprotox Resperdol: Based on experimental animal studies and human experience, therapy with risperidone during pregnancy is not anticipated to increase the risk of congenital anomalies. Transient  withdrawal can occur, but if patient needs it to function and participate in care, benefits would outweigh the risks.     She is doing well, so I will not start her on this now, but happy to facilitate if she needs it in the future/would like her to reach out to her psychiatrist to see them closer to delivery/postpartum anyway.    She is in group therapy as part of her drug treatment and has been sober for two years--she was congratulated.        Hepatitis C, negative viral load  I discussed the implications of infection with Hepatitis C virus. The most important consideration for the pregnancy is  transmission, which occurs approximately 5% of the time. Invasive procedures such as amniocentesis or fetal scalp electrode placement should be avoided if possible as they theoretically increase the risk of transmission.  I would expect her to be at exceptionally low risk of transmission since her viral load was undetectable and she has been previously treated.    Advanced Maternal Age  [ X ] stable  [   ] improving [  ] worsening    The patient's age related risk for aneuploidy was reviewed. Noninvasive prenatal screening with cell-free fetal DNA (NIPT) results reviewed, which were normal.    Advanced maternal age has been associated with placental insufficiency with increased risk of fetal growth disorder and hypertensive disorders. Recommend fetal growth surveillance. Start  fetal surveillance with weekly BPP at 32-36 weeks.      Recommend baby ASA, which she says she  will start      Summary of Plan  -Serial growth ultrasounds every 4 weeks (by primary OB)   -Starting at 32 weeks: Weekly fetal  surveillance until delivery   -Start baby ASA  -Pt to reach out to psychiatrist to re-establish care and be available postpartum as this is when most people with mood disorders have issues.  Stable off medication at this time.     Follow-up: No follow up with MFM scheduled, but I am happy to see for follow up at request of primary obstetrician    Thank you for the consult and opportunity to care for this patient.  Please feel free to reach out with any questions or concerns.      I spent 20 minutes caring for this patient on this date of service. This time includes time spent by me in the following activities: preparing for the visit, reviewing tests, obtaining and/or reviewing a separately obtained history, performing a medically appropriate examination and/or evaluation, counseling and educating the patient/family/caregiver and independently interpreting results and communicating that information with the patient/family/caregiver with greater than 50% spent in counseling and coordination of care.     Gaby Barrios MD FACOG  Maternal Fetal Medicine-[facility]  Office: [phone]  [email or web address]

## 2022-11-14 NOTE — PROGRESS NOTES
Pt reports that she is doing well and denies vaginal bleeding, cramping, contractions or LOF at this time. Reports round ligament pain. Reports active fetal movement. Denies HA, visual changes or epigastric pain. Denies any additional complaints at time of appointment. Next OB appointment scheduled for 11/15.    Vitals:    11/14/22 1001   BP: 99/66   Pulse: 91   Temp: 97.8 °F (36.6 °C)

## 2022-11-15 ENCOUNTER — ROUTINE PRENATAL (OUTPATIENT)
Dept: OBSTETRICS AND GYNECOLOGY | Age: 37
End: 2022-11-15

## 2022-11-15 VITALS — WEIGHT: 137 LBS | SYSTOLIC BLOOD PRESSURE: 100 MMHG | DIASTOLIC BLOOD PRESSURE: 60 MMHG | BODY MASS INDEX: 20.83 KG/M2

## 2022-11-15 DIAGNOSIS — O09.529 ANTEPARTUM MULTIGRAVIDA OF ADVANCED MATERNAL AGE: ICD-10-CM

## 2022-11-15 DIAGNOSIS — F31.62 BIPOLAR DISORDER, CURRENT EPISODE MIXED, MODERATE: ICD-10-CM

## 2022-11-15 DIAGNOSIS — F19.11 HISTORY OF SUBSTANCE ABUSE: ICD-10-CM

## 2022-11-15 DIAGNOSIS — B18.2 CHRONIC HEPATITIS C WITHOUT HEPATIC COMA: ICD-10-CM

## 2022-11-15 DIAGNOSIS — Z34.82 PRENATAL CARE, SUBSEQUENT PREGNANCY, SECOND TRIMESTER: Primary | ICD-10-CM

## 2022-11-15 DIAGNOSIS — U07.1 COVID-19 VIRUS INFECTION: ICD-10-CM

## 2022-11-15 PROCEDURE — 99214 OFFICE O/P EST MOD 30 MIN: CPT | Performed by: OBSTETRICS & GYNECOLOGY

## 2022-11-15 RX ORDER — ASPIRIN 81 MG/1
81 TABLET ORAL DAILY
Qty: 90 TABLET | Refills: 2 | Status: SHIPPED | OUTPATIENT
Start: 2022-11-15 | End: 2023-03-05 | Stop reason: HOSPADM

## 2022-11-15 NOTE — PROGRESS NOTES
Chief Complaint   Patient presents with   • Routine Prenatal Visit     HPI- Pt is 37 y.o.  at 19w6d here for prenatal visit.     ROS-     - No vaginal bleeding    GI- No abdominal pain    /60   Wt 62.1 kg (137 lb)   LMP  (LMP Unknown)   BMI 20.83 kg/m²   Exam - See flow sheet    Fetal heart rate is normal    Assessment-  Diagnoses and all orders for this visit:    Prenatal care, subsequent pregnancy, second trimester  -     POC Urinalysis Dipstick    Bipolar disorder, current episode mixed, moderate (HCC)  Stable off Res[facility], Dr. Petar smith  Chronic hepatitis C without hepatic coma (HCC)  R load negative last check follows in [facility]  COVID-19 virus infection    History of substance abuse (HCC)  Plane and sober for 2 and half years  Antepartum multigravida of advanced maternal age  Get a cell free DNA, growth every 4 weeks BPP's at 36  Other orders  -     aspirin 81 MG EC tablet; Take 1 tablet by mouth Daily.

## 2022-11-16 ENCOUNTER — APPOINTMENT (OUTPATIENT)
Dept: ULTRASOUND IMAGING | Facility: HOSPITAL | Age: 37
End: 2022-11-16

## 2022-12-06 ENCOUNTER — PATIENT OUTREACH (OUTPATIENT)
Dept: LABOR AND DELIVERY | Facility: HOSPITAL | Age: 37
End: 2022-12-06

## 2022-12-06 ENCOUNTER — REFERRAL TRIAGE (OUTPATIENT)
Dept: LABOR AND DELIVERY | Facility: HOSPITAL | Age: 37
End: 2022-12-06

## 2022-12-06 NOTE — OUTREACH NOTE
[facility]  Unable to Reach       Questions/Answers    Flowsheet Row Responses   Pending Outreach Confirm Patient Interest   Call Attempt First   Outcome Left message   Next Call Attempt Date 12/09/22          Linnea Phillips RN  Maternity Nurse Navigator    12/6/2022, 16:12 EST

## 2022-12-09 ENCOUNTER — PATIENT OUTREACH (OUTPATIENT)
Dept: LABOR AND DELIVERY | Facility: HOSPITAL | Age: 37
End: 2022-12-09

## 2022-12-09 NOTE — OUTREACH NOTE
[facility]  Unable to Reach       Questions/Answers    Flowsheet Row Responses   Pending Outreach Confirm Patient Interest   Call Attempt Second   Outcome Left message   Next Call Attempt Date 12/12/22        Had a missed call from pt so will try to call a third time next week.     Linnea Phillips RN  Maternity Nurse Navigator    12/9/2022, 13:13 EST

## 2022-12-09 NOTE — OUTREACH NOTE
[facility]  Enrollment    Current Estimated Gestational Age: 23w2d    Questions/Answers    Flowsheet Row Responses   Would like to participate? Yes   Date of Intake Visit 12/09/22        [facility]  Intake    Current Estimated Gestational Age: 23w2d    Questions/Answers    Flowsheet Row Responses   Best Method for Contacting Cell   Do you have a dentist? Yes   Dentist Name [facility]   Have you seen a dentist in the last 6 months --  [dentures ]   Maternal Warning Signs Provided        [facility]  Check-In    Current Estimated Gestational Age: 23w2d    Questions/Answers    Flowsheet Row Responses   Best Method for Contacting Cell   Demographics Reviewed Yes   Currently Employed Yes  [ ]   Able to keep appointments as scheduled Yes   Gender(s) and Name(s) girl   Baby Active/Feeling Fetal Movemen Yes   May I ask you questions about your substance use? Yes   Other Comment smoker 1/2 PPD, sober 28 months   Resource/Environmental Concerns None   Do you have any questions related to your care experience, your pregnancy, plans for delivery, any concerns, etc? No        Enrollment completed. Pt would like to sign up for [facility] for info sent. She is 28 months sober and working on getting custody of her two children. Currently working with no major resource needs at this time. F/u in one month.     Linnea Phillips, RN  Maternity Nurse Navigator    12/9/2022, 15:34 EST

## 2022-12-14 ENCOUNTER — ROUTINE PRENATAL (OUTPATIENT)
Dept: OBSTETRICS AND GYNECOLOGY | Age: 37
End: 2022-12-14

## 2022-12-14 VITALS — BODY MASS INDEX: 21.59 KG/M2 | WEIGHT: 142 LBS | SYSTOLIC BLOOD PRESSURE: 102 MMHG | DIASTOLIC BLOOD PRESSURE: 60 MMHG

## 2022-12-14 DIAGNOSIS — F31.62 BIPOLAR DISORDER, CURRENT EPISODE MIXED, MODERATE: ICD-10-CM

## 2022-12-14 DIAGNOSIS — Z3A.24 24 WEEKS GESTATION OF PREGNANCY: ICD-10-CM

## 2022-12-14 DIAGNOSIS — Z13.1 SCREENING FOR DIABETES MELLITUS: ICD-10-CM

## 2022-12-14 DIAGNOSIS — R79.89 ELEVATED TSH: ICD-10-CM

## 2022-12-14 DIAGNOSIS — Z13.89 SCREENING FOR BLOOD OR PROTEIN IN URINE: Primary | ICD-10-CM

## 2022-12-14 DIAGNOSIS — B18.2 CHRONIC HEPATITIS C WITHOUT HEPATIC COMA: ICD-10-CM

## 2022-12-14 DIAGNOSIS — O09.529 ANTEPARTUM MULTIGRAVIDA OF ADVANCED MATERNAL AGE: ICD-10-CM

## 2022-12-14 DIAGNOSIS — Z72.0 TOBACCO ABUSE: ICD-10-CM

## 2022-12-14 DIAGNOSIS — F19.11 HISTORY OF SUBSTANCE ABUSE: ICD-10-CM

## 2022-12-14 LAB
GLUCOSE UR STRIP-MCNC: NEGATIVE MG/DL
PROT UR STRIP-MCNC: NEGATIVE MG/DL

## 2022-12-14 PROCEDURE — 99213 OFFICE O/P EST LOW 20 MIN: CPT | Performed by: PHYSICIAN ASSISTANT

## 2022-12-14 NOTE — PROGRESS NOTES
Chief Complaint   Patient presents with   • Routine Prenatal Visit     24 week ob , 1 hour gtt       HPI: 37 y.o.  at 24w0d gestation  She is here for routine ob visit and also getting her glucose tolerance test today  Has no c/o  Declines flu vaccine  Plan tdap anv  H/o elevated TSH so plan recheck today        Vitals:    22 1324   BP: 102/60   Weight: 64.4 kg (142 lb)       ROS:  GI:  Negative  : na  Pulmonary: Negative     A/P  1. Intrauterine pregnancy at 24w0d   2. Pregnancy Risk:  HIGH RISK    Diagnoses and all orders for this visit:    1. Screening for blood or protein in urine (Primary)  -     POC Urinalysis Dipstick, Multipro    2. 24 weeks gestation of pregnancy  -     POC Urinalysis Dipstick, Multipro  -     TSH    3. Bipolar disorder, current episode mixed, moderate (HCC)    4. Tobacco abuse    5. Antepartum multigravida of advanced maternal age    6. Chronic hepatitis C without hepatic coma (HCC)    7. History of substance abuse (HCC)    8. Screening for diabetes mellitus  -     CBC & Differential  -     Gestational Screen 1 Hr ([facility])    9. Elevated TSH  -     TSH        -----------------------  PLAN:   Return in about 4 weeks (around 2023) for belly check.      TAMREA Moody  2022 13:47 EST

## 2022-12-15 LAB
BASOPHILS # BLD AUTO: 0.05 10*3/MM3 (ref 0–0.2)
BASOPHILS NFR BLD AUTO: 0.3 % (ref 0–1.5)
EOSINOPHIL # BLD AUTO: 0.2 10*3/MM3 (ref 0–0.4)
EOSINOPHIL NFR BLD AUTO: 1.4 % (ref 0.3–6.2)
ERYTHROCYTE [DISTWIDTH] IN BLOOD BY AUTOMATED COUNT: 12.9 % (ref 12.3–15.4)
GLUCOSE 1H P 50 G GLC PO SERPL-MCNC: 72 MG/DL (ref 65–139)
HCT VFR BLD AUTO: 30.1 % (ref 34–46.6)
HGB BLD-MCNC: 10.2 G/DL (ref 12–15.9)
IMM GRANULOCYTES # BLD AUTO: 0.08 10*3/MM3 (ref 0–0.05)
IMM GRANULOCYTES NFR BLD AUTO: 0.6 % (ref 0–0.5)
LYMPHOCYTES # BLD AUTO: 1.72 10*3/MM3 (ref 0.7–3.1)
LYMPHOCYTES NFR BLD AUTO: 11.9 % (ref 19.6–45.3)
MCH RBC QN AUTO: 32 PG (ref 26.6–33)
MCHC RBC AUTO-ENTMCNC: 33.9 G/DL (ref 31.5–35.7)
MCV RBC AUTO: 94.4 FL (ref 79–97)
MONOCYTES # BLD AUTO: 0.67 10*3/MM3 (ref 0.1–0.9)
MONOCYTES NFR BLD AUTO: 4.6 % (ref 5–12)
NEUTROPHILS # BLD AUTO: 11.75 10*3/MM3 (ref 1.7–7)
NEUTROPHILS NFR BLD AUTO: 81.2 % (ref 42.7–76)
NRBC BLD AUTO-RTO: 0 /100 WBC (ref 0–0.2)
PLATELET # BLD AUTO: 259 10*3/MM3 (ref 140–450)
RBC # BLD AUTO: 3.19 10*6/MM3 (ref 3.77–5.28)
TSH SERPL DL<=0.005 MIU/L-ACNC: 1.56 UIU/ML (ref 0.27–4.2)
WBC # BLD AUTO: 14.47 10*3/MM3 (ref 3.4–10.8)

## 2022-12-20 ENCOUNTER — TELEPHONE (OUTPATIENT)
Dept: OBSTETRICS AND GYNECOLOGY | Age: 37
End: 2022-12-20

## 2022-12-20 RX ORDER — FERROUS SULFATE 325(65) MG
325 TABLET ORAL
Qty: 90 TABLET | Refills: 3 | Status: SHIPPED | OUTPATIENT
Start: 2022-12-20

## 2022-12-20 NOTE — TELEPHONE ENCOUNTER
Pt calls stating pharmacy has not received a script for her iron supplement, it does not appear it has been called in yet. Please advise if you will call this in for pt, pharmacy verified

## 2023-01-04 ENCOUNTER — PATIENT OUTREACH (OUTPATIENT)
Dept: LABOR AND DELIVERY | Facility: HOSPITAL | Age: 38
End: 2023-01-04
Payer: MEDICAID

## 2023-01-04 NOTE — OUTREACH NOTE
[facility]  Check-In    Current Estimated Gestational Age: 27w0d    Questions/Answers    Flowsheet Row Responses   Best Method for Contacting Cell   Demographics Reviewed Yes   Currently Employed Yes   Able to keep appointments as scheduled Yes   Gender(s) and Name(s) girl   Baby Active/Feeling Fetal Movemen Yes   How are you presently feeling? good, just tired from holidays   May I ask you questions about your substance use? Yes   Other Comment smoker 1/2 PPD, sober 30 months   Supplies ready for baby Clothing, Diapers, Feeding Supplies  [baby shower in March]   Resource/Environmental Concerns None   Do you have any questions related to your care experience, your pregnancy, plans for delivery, any concerns, etc? No   Other Education Insurance benefits/Incentives        Pt doing well, no resource needs at this time. Reminded her about signing up for WIC and calling insurance for pack n play. Reviewed fetal kick counts. F/u in one month.     Linnea Phillips RN  Maternity Nurse Navigator    1/4/2023, 15:38 EST

## 2023-01-04 NOTE — OUTREACH NOTE
[facility]  Unable to Reach       Questions/Answers    Flowsheet Row Responses   Pending Outreach Prenatal Check-in   Call Attempt First   Outcome Left message   Next Call Attempt Date 01/06/23          Linnea Phillips RN  Maternity Nurse Navigator    1/4/2023, 14:21 EST

## 2023-01-16 ENCOUNTER — ROUTINE PRENATAL (OUTPATIENT)
Dept: OBSTETRICS AND GYNECOLOGY | Age: 38
End: 2023-01-16
Payer: MEDICAID

## 2023-01-16 VITALS — SYSTOLIC BLOOD PRESSURE: 112 MMHG | BODY MASS INDEX: 21.74 KG/M2 | DIASTOLIC BLOOD PRESSURE: 68 MMHG | WEIGHT: 143 LBS

## 2023-01-16 DIAGNOSIS — Z23 NEED FOR TDAP VACCINATION: ICD-10-CM

## 2023-01-16 DIAGNOSIS — F31.62 BIPOLAR DISORDER, CURRENT EPISODE MIXED, MODERATE: ICD-10-CM

## 2023-01-16 DIAGNOSIS — Z72.0 TOBACCO ABUSE: ICD-10-CM

## 2023-01-16 DIAGNOSIS — O09.529 ANTEPARTUM MULTIGRAVIDA OF ADVANCED MATERNAL AGE: ICD-10-CM

## 2023-01-16 DIAGNOSIS — Z13.89 SCREENING FOR BLOOD OR PROTEIN IN URINE: Primary | ICD-10-CM

## 2023-01-16 PROBLEM — F10.10 ALCOHOL ABUSE: Status: RESOLVED | Noted: 2020-10-08 | Resolved: 2023-01-16

## 2023-01-16 LAB
GLUCOSE UR STRIP-MCNC: NEGATIVE MG/DL
PROT UR STRIP-MCNC: NEGATIVE MG/DL

## 2023-01-16 PROCEDURE — 90715 TDAP VACCINE 7 YRS/> IM: CPT | Performed by: OBSTETRICS & GYNECOLOGY

## 2023-01-16 PROCEDURE — 99213 OFFICE O/P EST LOW 20 MIN: CPT | Performed by: OBSTETRICS & GYNECOLOGY

## 2023-01-16 PROCEDURE — 90471 IMMUNIZATION ADMIN: CPT | Performed by: OBSTETRICS & GYNECOLOGY

## 2023-01-16 NOTE — PROGRESS NOTES
Chief Complaint   Patient presents with   • Routine Prenatal Visit     Cc:  no problems     HPI- Pt is 37 y.o.  at 28w5d here for prenatal visit.     ROS-     - No vaginal bleeding    GI- No abdominal pain    /68   Wt 64.9 kg (143 lb)   LMP  (LMP Unknown)   BMI 21.74 kg/m²   Exam - See flow sheet    Fetal heart rate is normal    Assessment-  Diagnoses and all orders for this visit:    Screening for blood or protein in urine  -     POC Urinalysis Dipstick    Need for Tdap vaccination  -     Tdap Vaccine Greater Than or Equal To 6yo IM    Tobacco abuse    Bipolar disorder, current episode mixed, moderate (HCC)    Antepartum multigravida of advanced maternal age    Will continue iron twice daily, will continue baby aspirin, will start BPP's at 36 weeks patient will follow-up with PCP for smoking cessation

## 2023-01-31 ENCOUNTER — ROUTINE PRENATAL (OUTPATIENT)
Dept: OBSTETRICS AND GYNECOLOGY | Age: 38
End: 2023-01-31
Payer: MEDICAID

## 2023-01-31 VITALS — WEIGHT: 145 LBS | DIASTOLIC BLOOD PRESSURE: 64 MMHG | BODY MASS INDEX: 22.05 KG/M2 | SYSTOLIC BLOOD PRESSURE: 114 MMHG

## 2023-01-31 DIAGNOSIS — B18.2 CHRONIC HEPATITIS C WITHOUT HEPATIC COMA: ICD-10-CM

## 2023-01-31 DIAGNOSIS — U07.1 COVID-19 VIRUS INFECTION: ICD-10-CM

## 2023-01-31 DIAGNOSIS — Z13.89 SCREENING FOR BLOOD OR PROTEIN IN URINE: Primary | ICD-10-CM

## 2023-01-31 DIAGNOSIS — F19.11 HISTORY OF SUBSTANCE ABUSE: ICD-10-CM

## 2023-01-31 DIAGNOSIS — O09.529 ANTEPARTUM MULTIGRAVIDA OF ADVANCED MATERNAL AGE: ICD-10-CM

## 2023-01-31 DIAGNOSIS — F31.62 BIPOLAR DISORDER, CURRENT EPISODE MIXED, MODERATE: ICD-10-CM

## 2023-01-31 PROBLEM — F19.10 SUBSTANCE ABUSE: Status: RESOLVED | Noted: 2020-10-08 | Resolved: 2023-01-31

## 2023-01-31 LAB
GLUCOSE UR STRIP-MCNC: NEGATIVE MG/DL
PROT UR STRIP-MCNC: NEGATIVE MG/DL

## 2023-01-31 PROCEDURE — 99213 OFFICE O/P EST LOW 20 MIN: CPT | Performed by: OBSTETRICS & GYNECOLOGY

## 2023-01-31 NOTE — PROGRESS NOTES
Chief Complaint   Patient presents with   • Routine Prenatal Visit     ob ck, pt c/o lower abdominal pressure and cramping in her calves, denies redness, swelling or heat      HPI- Pt is 37 y.o.  at 30w6d here for prenatal visit.     ROS-     - No vaginal bleeding    GI- No abdominal pain    /64   Wt 65.8 kg (145 lb)   LMP  (LMP Unknown)   BMI 22.05 kg/m²   Exam - See flow sheet    Fetal heart rate is normal    Assessment-  Diagnoses and all orders for this visit:    Screening for blood or protein in urine  -     POC Urinalysis Dipstick    History of substance abuse (HCC)    COVID-19 virus infection    Chronic hepatitis C without hepatic coma (HCC)    Bipolar disorder, current episode mixed, moderate (HCC)    Antepartum multigravida of advanced maternal age    Patient interested in having her tubes tied she is planning on epidural anesthesia, tubal papers to be signed before she leaves today

## 2023-02-13 ENCOUNTER — ROUTINE PRENATAL (OUTPATIENT)
Dept: OBSTETRICS AND GYNECOLOGY | Age: 38
End: 2023-02-13
Payer: MEDICAID

## 2023-02-13 VITALS — SYSTOLIC BLOOD PRESSURE: 110 MMHG | DIASTOLIC BLOOD PRESSURE: 68 MMHG | BODY MASS INDEX: 21.9 KG/M2 | WEIGHT: 144 LBS

## 2023-02-13 DIAGNOSIS — F11.10 HEROIN ABUSE: ICD-10-CM

## 2023-02-13 DIAGNOSIS — Z72.0 TOBACCO ABUSE: ICD-10-CM

## 2023-02-13 DIAGNOSIS — O09.529 ANTEPARTUM MULTIGRAVIDA OF ADVANCED MATERNAL AGE: ICD-10-CM

## 2023-02-13 DIAGNOSIS — F19.11 HISTORY OF SUBSTANCE ABUSE: ICD-10-CM

## 2023-02-13 DIAGNOSIS — Z13.89 SCREENING FOR BLOOD OR PROTEIN IN URINE: Primary | ICD-10-CM

## 2023-02-13 LAB
GLUCOSE UR STRIP-MCNC: NEGATIVE MG/DL
PROT UR STRIP-MCNC: NEGATIVE MG/DL

## 2023-02-13 PROCEDURE — 99213 OFFICE O/P EST LOW 20 MIN: CPT | Performed by: OBSTETRICS & GYNECOLOGY

## 2023-02-13 NOTE — PROGRESS NOTES
Chief Complaint   Patient presents with   • Routine Prenatal Visit     Cc;  headache and nausea     HPI- Pt is 37 y.o.  at 32w5d here for prenatal visit.     ROS-     - No vaginal bleeding    GI- No abdominal pain    /68   Wt 65.3 kg (144 lb)   LMP  (LMP Unknown)   BMI 21.90 kg/m²   Exam - See flow sheet    Fetal heart rate is normal    Assessment-  Diagnoses and all orders for this visit:    Screening for blood or protein in urine  -     POC Urinalysis Dipstick    Tobacco abuse    History of substance abuse (HCC)    Heroin abuse (HCC)    Antepartum multigravida of advanced maternal age      Patient states tubal papers have been signed  Start ultrasounds in 2 weeks  Patient reports good fetal movement no significant contractions  Note has been written so she will work less than full-time starting at 36 weeks.  Starting to , less than 30 hours/week

## 2023-02-15 LAB
AMPHETAMINES UR QL SCN: NEGATIVE NG/ML
BARBITURATES UR QL SCN: NEGATIVE NG/ML
BENZODIAZ UR QL: NEGATIVE NG/ML
BZE UR QL: NEGATIVE NG/ML
CANNABINOIDS UR QL SCN: NEGATIVE NG/ML
METHADONE UR QL SCN: NEGATIVE NG/ML
OPIATES UR QL: NEGATIVE NG/ML
PCP UR QL: NEGATIVE NG/ML
PROPOXYPH UR QL SCN: NEGATIVE NG/ML

## 2023-02-16 ENCOUNTER — HOSPITAL ENCOUNTER (OUTPATIENT)
Facility: HOSPITAL | Age: 38
Discharge: HOME | End: 2023-02-16
Attending: OBSTETRICS & GYNECOLOGY | Admitting: OBSTETRICS & GYNECOLOGY
Payer: MEDICAID

## 2023-02-16 VITALS
OXYGEN SATURATION: 94 % | HEART RATE: 73 BPM | SYSTOLIC BLOOD PRESSURE: 101 MMHG | TEMPERATURE: 98.2 F | DIASTOLIC BLOOD PRESSURE: 56 MMHG | RESPIRATION RATE: 18 BRPM

## 2023-02-16 LAB
BILIRUB BLD-MCNC: NEGATIVE MG/DL
CLARITY, POC: CLEAR
COLOR UR: ABNORMAL
GLUCOSE UR STRIP-MCNC: NEGATIVE MG/DL
KETONES UR QL: NEGATIVE
LEUKOCYTE EST, POC: ABNORMAL
NITRITE UR-MCNC: NEGATIVE MG/ML
PH UR: 6.5 [PH] (ref 5–8)
PROT UR STRIP-MCNC: ABNORMAL MG/DL
RBC # UR STRIP: NEGATIVE /UL
SP GR UR: 1.02 (ref 1–1.03)
UROBILINOGEN UR QL: NORMAL

## 2023-02-16 PROCEDURE — G0463 HOSPITAL OUTPT CLINIC VISIT: HCPCS

## 2023-02-16 PROCEDURE — 59025 FETAL NON-STRESS TEST: CPT

## 2023-02-16 PROCEDURE — 81002 URINALYSIS NONAUTO W/O SCOPE: CPT | Performed by: OBSTETRICS & GYNECOLOGY

## 2023-02-16 RX ORDER — ACETAMINOPHEN 325 MG/1
650 TABLET ORAL EVERY 6 HOURS PRN
COMMUNITY
End: 2023-03-05 | Stop reason: HOSPADM

## 2023-02-17 NOTE — OBED NOTES
REA Joiner  Obstetric History and Physical    Chief Complaint   Patient presents with   • Contractions     Started 3-4 hours ago       Subjective     Patient is a 37 y.o. female  currently at 33w2d, who presents with complaint of contractions.  She denies any vaginal bleeding.  Positive fetal movements.  No headache visual disturbance or right upper quadrant pain.    Her prenatal care is complicated by chronic hepatitis C..  Her previous obstetric/gynecological history is noted for is non-contributory.    The following portions of the patients history were reviewed and updated as appropriate: current medications, allergies, past medical history, past surgical history, past family history, past social history and problem list .       Prenatal Information:  Prenatal Results     POC Urine Glucose/Protein     Test Value Reference Range Date Time    Urine Glucose  Negative mg/dL Negative 23    Urine Protein  30 mg/dL mg/dL Negative 23          Initial Prenatal Labs     Test Value Reference Range Date Time    Hemoglobin  12.0 g/dL 12.0 - 15.9 10/18/22 2233       13.1 g/dL 11.1 - 15.9 2234    Hematocrit  35.9 % 34.0 - 46.6 10/18/22 2233       39.3 % 34.0 - 46.6 2234    Platelets  276 10*3/mm3 140 - 450 10/18/22 2233       250 x10E3/uL 150 - 450 22 0934    Rubella IgG  5.82 index Immune >0.99 2234    Hepatitis B SAg  Negative  Negative 2234    Hepatitis C Ab  >11.0 s/co ratio 0.0 - 0.9 2234    RPR  Non Reactive  Non Reactive 22 0934    ABO  AB   10/18/22 2233    Rh  Positive   10/18/22 2233    Antibody Screen  Negative   10/18/22 2233       Negative  Negative 22 0934    HIV  Non Reactive  Non Reactive 22 0934    Urine Culture  Final report   22 0920    Gonorrhea  Negative  Negative 22 0930       Negative  Negative 22 0910    Chlamydia  Negative  Negative 22 0930       Negative  Negative 22 0910     TSH  1.560 uIU/mL 0.270 - 4.200 12/14/22 1429    HgB A1c               2nd and 3rd Trimester     Test Value Reference Range Date Time    Hemoglobin (repeated)  10.2 g/dL 12.0 - 15.9 12/14/22 1429    Hematocrit (repeated)  30.1 % 34.0 - 46.6 12/14/22 1429    Platelets   259 10*3/mm3 140 - 450 12/14/22 1429       276 10*3/mm3 140 - 450 10/18/22 2233       250 x10E3/uL 150 - 450 09/13/22 0934    GCT  72 mg/dL 65 - 139 12/14/22 1429    Antibody Screen (repeated)        GTT Fasting        GTT 1 Hr        GTT 2 Hr        GTT 3 Hr        Group B Strep              Drug Screening     Test Value Reference Range Date Time    Amphetamine Screen  Negative ng/mL Wkxtld=3955 02/13/23 0850    Barbiturate Screen  Negative ng/mL Hjorjh=257 02/13/23 0850    Benzodiazepine Screen  Negative ng/mL Wnredn=979 02/13/23 0850    Methadone Screen  Negative ng/mL Dxyyxr=709 02/13/23 0850    Phencyclidine Screen  Negative ng/mL Cutoff=25 02/13/23 0850    Opiates Screen  Negative ng/mL Fgylsk=112 02/13/23 0850    THC Screen  Negative ng/mL Cutoff=50 02/13/23 0850    Cocaine Screen  Negative ng/mL Bobumf=323 02/13/23 0850    Propoxyphene Screen  Negative ng/mL Yqoyuw=658 02/13/23 0850    Buprenorphine Screen        Methamphetamine Screen        Oxycodone Screen        Tricyclic Antidepressants Screen              Other (Risk screening)     Test Value Reference Range Date Time    Varicella IgG        Parvovirus IgG        CMV IgG        Cystic Fibrosis        Hemoglobin electrophoresis        NIPT        MSAFP-4        AFP (for NTD only)              Legend    ^: Historical                      External Prenatal Results     Pregnancy Outside Results - Transcribed From Office Records - See Scanned Records For Details     Test Value Date Time    ABO  AB  10/18/22 2233    Rh  Positive  10/18/22 2233    Antibody Screen  Negative  10/18/22 2233       Negative  09/13/22 0934    Varicella IgG       Rubella  5.82 index 09/13/22 0934    Hgb  10.2 g/dL  22 1429       12.0 g/dL 10/18/22 2233       13.1 g/dL 22 0934    Hct  30.1 % 22 1429       35.9 % 10/18/22 2233       39.3 % 22 0934    Glucose Fasting GTT       Glucose Tolerance Test 1 hour       Glucose Tolerance Test 3 hour       Gonorrhea (discrete)  Negative  22 0930       Negative  22 0910    Chlamydia (discrete)  Negative  22 0930       Negative  22 0910    RPR  Non Reactive  22 0934    VDRL       Syphilis Antibody       HBsAg  Negative  22 0934    Herpes Simplex Virus PCR       Herpes Simplex VIrus Culture       HIV  Non Reactive  22 0934    Hep C RNA Quant PCR       Hep C Antibody  >11.0 s/co ratio 22 0934    AFP       Group B Strep       GBS Susceptibility to Clindamycin       GBS Susceptibility to Erythromycin       Fetal Fibronectin       Genetic Testing, Maternal Blood             Drug Screening     Test Value Date Time    Urine Drug Screen       Amphetamine Screen  Negative ng/mL 23 0850    Barbiturate Screen  Negative ng/mL 23 0850    Benzodiazepine Screen  Negative ng/mL 23 0850    Methadone Screen  Negative ng/mL 23 0850    Phencyclidine Screen  Negative ng/mL 23 0850    Opiates Screen ^ Absent  21 1017    THC Screen ^ Absent  21 1017    Cocaine Screen       Propoxyphene Screen  Negative ng/mL 23 0850    Buprenorphine Screen ^ Absent  21 1017    Methamphetamine Screen       Oxycodone Screen       Tricyclic Antidepressants Screen             Legend    ^: Historical                         Past OB History:     OB History    Para Term  AB Living   3 2 2 0 0 2   SAB IAB Ectopic Molar Multiple Live Births   0 0 0 0 0 2      # Outcome Date GA Lbr Donavon/2nd Weight Sex Delivery Anes PTL Lv   3 Current            2 Term    3771 g (8 lb 5 oz) M Vag-Spont   TITA   1 Term    4082 g (9 lb) M Vag-Spont   TITA       Past Medical History: Past Medical History:   Diagnosis  Date   • Anemia 2003    1st pregnancy   • Bipolar disorder (HCC) 2000   • Depression    • Hepatitis C     finished tx 2021   • PONV (postoperative nausea and vomiting) 2004   • Substance abuse (HCC) 2007   • Varicella       Past Surgical History Past Surgical History:   Procedure Laterality Date   • KNEE SURGERY Left     2 surgeries   • WISDOM TOOTH EXTRACTION  2007      Family History: Family History   Problem Relation Age of Onset   • Breast cancer Paternal Aunt    • Breast cancer Paternal Aunt       Social History:  reports that she has been smoking cigarettes. She has a 15.00 pack-year smoking history. She has never used smokeless tobacco.   reports no history of alcohol use.   reports that she does not currently use drugs after having used the following drugs: Fentanyl, Heroin, and Marijuana.        General ROS: Pertinent items are noted in HPI    Objective       Vital Signs Range for the last 24 hours  Temperature: Temp:  [98.2 °F (36.8 °C)-98.4 °F (36.9 °C)] 98.2 °F (36.8 °C)   Temp Source: Temp src: Oral   BP: BP: ()/(56-57) 101/56   Pulse: Heart Rate:  [73-84] 73   Respirations: Resp:  [16-18] 18   SPO2: SpO2:  [94 %-96 %] 94 %   O2 Amount (l/min):     O2 Devices Device (Oxygen Therapy): room air   Weight:       Physical Examination: General appearance - alert, well appearing, and in no distress  Mental status - alert, oriented to person, place, and time  Chest - clear to auscultation, no wheezes, rales or rhonchi, symmetric air entry  Heart - normal rate, regular rhythm, normal S1, S2, no murmurs, rubs, clicks or gallops  Abdomen - soft, nontender, gravid  Extremities -trace edema    Presentation:  Unknown   Cervix: Exam by: Method: sterile exam per RN   Dilation: Cervical Dilation (cm): 1   Effacement: Cervical Effacement: 40%   Station:         Fetal Heart Rate Assessment   Method: Fetal HR Assessment Method: external   Beats/min: Fetal HR (beats/min): 130   Baseline: Fetal HR Baseline: normal  range   Variability: Fetal HR Variability: moderate (amplitude range 6 to 25 bpm)   Accels: Fetal HR Accelerations: lasting at least 15 seconds, greater than/equal to 15 bpm   Decels: Fetal HR Decelerations: variable         Uterine Assessment   Method: Method: palpation, external tocotransducer   Frequency (min): Contraction Frequency (Minutes): Irritability   Ctx Count in 10 min:     Duration:     Intensity:         [place] Units:       GBS is unknown    Assessment & Plan     Contractions during the pregnancy      Assessment:  1.  Intrauterine pregnancy at 33w2d gestation with reactive fetal status.    2.  False labor at 33 weeks estimate gestational    Plan:  Discharge home   labor precautions  Strict fetal kick count      Ramón Simons MD  2023  07:57 EST

## 2023-02-27 ENCOUNTER — PATIENT OUTREACH (OUTPATIENT)
Dept: LABOR AND DELIVERY | Facility: HOSPITAL | Age: 38
End: 2023-02-27
Payer: MEDICAID

## 2023-02-27 ENCOUNTER — ROUTINE PRENATAL (OUTPATIENT)
Dept: OBSTETRICS AND GYNECOLOGY | Age: 38
End: 2023-02-27
Payer: MEDICAID

## 2023-02-27 VITALS — SYSTOLIC BLOOD PRESSURE: 112 MMHG | DIASTOLIC BLOOD PRESSURE: 64 MMHG | WEIGHT: 146.4 LBS | BODY MASS INDEX: 22.26 KG/M2

## 2023-02-27 DIAGNOSIS — Z13.89 SCREENING FOR BLOOD OR PROTEIN IN URINE: ICD-10-CM

## 2023-02-27 DIAGNOSIS — Z3A.34 34 WEEKS GESTATION OF PREGNANCY: Primary | ICD-10-CM

## 2023-02-27 DIAGNOSIS — O09.529 ANTEPARTUM MULTIGRAVIDA OF ADVANCED MATERNAL AGE: ICD-10-CM

## 2023-02-27 DIAGNOSIS — F31.62 BIPOLAR DISORDER, CURRENT EPISODE MIXED, MODERATE: ICD-10-CM

## 2023-02-27 DIAGNOSIS — F19.11 HISTORY OF SUBSTANCE ABUSE: ICD-10-CM

## 2023-02-27 DIAGNOSIS — Z72.0 TOBACCO ABUSE: ICD-10-CM

## 2023-02-27 DIAGNOSIS — B18.2 CHRONIC HEPATITIS C WITHOUT HEPATIC COMA: ICD-10-CM

## 2023-02-27 PROCEDURE — 99213 OFFICE O/P EST LOW 20 MIN: CPT | Performed by: OBSTETRICS & GYNECOLOGY

## 2023-02-27 NOTE — OUTREACH NOTE
[facility]  Check-In    Current Estimated Gestational Age: 34w5d    Questions/Answers    Flowsheet Row Responses   Best Method for Contacting Cell   Demographics Reviewed Yes   Currently Employed Yes   Able to keep appointments as scheduled Yes   Gender(s) and Name(s) girl   Baby Active/Feeling Fetal Movemen Yes   How are you presently feeling? really good   Questions regarding prenatal visits or tests to be ordered? No   May I ask you questions about your substance use? Yes   Other Comment sober over 2 years, smoker   Supplies ready for baby Car Seat, Clothing, Crib, Diapers, Feeding Supplies   Resource/Environmental Concerns None   Do you have any questions related to your care experience, your pregnancy, plans for delivery, any concerns, etc? No   Other Education Birth Plan, Insurance benefits/Incentives, WIC Benefits        Pt doing very well, still working and planning to for another month before starting FMLA. Has all necessary infant supplies and WIC appt on 3/7. Reviewed labor precautions and location of L&D. F/u inpatient after delivery.     Linnea Lindsey RN  Maternity Nurse Navigator    2/27/2023, 10:19 EST

## 2023-02-27 NOTE — OUTREACH NOTE
[facility]  Unable to Reach       Questions/Answers    Flowsheet Row Responses   Pending Outreach Prenatal Check-in   Call Attempt First   Outcome Not available   Next Call Attempt Date 03/01/23   Unable to reach comments: no KLEVER Yarbrough S - RN  Maternity Nurse Navigator    2/27/2023, 10:04 EST

## 2023-02-27 NOTE — PROGRESS NOTES
Chief Complaint   Patient presents with   • Routine Prenatal Visit     34w5d, US today, No problems today     HPI- Pt is 37 y.o.  at 34w5d here for prenatal visit.     ROS-     - No vaginal bleeding    GI- No abdominal pain    /64   Wt 66.4 kg (146 lb 6.4 oz)   LMP  (LMP Unknown)   BMI 22.26 kg/m²   Exam - See flow sheet    Fetal heart rate is normal    Assessment-  Diagnoses and all orders for this visit:    34 weeks gestation of pregnancy  -     POC Urinalysis Dipstick    Screening for blood or protein in urine  -     POC Urinalysis Dipstick    Tobacco abuse    History of substance abuse (HCC)    Chronic hepatitis C without hepatic coma (HCC)    Bipolar disorder, current episode mixed, moderate (HCC)    Antepartum multigravida of advanced maternal age    Patient reports that she is feeling well with good fetal movement  Today's ultrasound reassuring BPP 8 out of 8 Vertex appropriate growth placenta appears normal  Most recent drug screen was negative, patient is stable from her bipolar off [place] at all still sees Dr. Davey in [facility].  We will follow-up with gastroenterology for her chronic hep C last viral load negative  Signs symptoms of labor reviewed  Continue t BPP's weekly at 36 weeks

## 2023-03-02 ENCOUNTER — HOSPITAL ENCOUNTER (INPATIENT)
Facility: HOSPITAL | Age: 38
LOS: 3 days | Discharge: HOME | End: 2023-03-05
Attending: OBSTETRICS & GYNECOLOGY | Admitting: OBSTETRICS & GYNECOLOGY
Payer: MEDICAID

## 2023-03-02 ENCOUNTER — TELEPHONE (OUTPATIENT)
Dept: OBSTETRICS AND GYNECOLOGY | Age: 38
End: 2023-03-02

## 2023-03-02 ENCOUNTER — ANESTHESIA (OUTPATIENT)
Dept: LABOR AND DELIVERY | Facility: HOSPITAL | Age: 38
End: 2023-03-02
Payer: MEDICAID

## 2023-03-02 ENCOUNTER — ANESTHESIA EVENT (OUTPATIENT)
Dept: LABOR AND DELIVERY | Facility: HOSPITAL | Age: 38
End: 2023-03-02
Payer: MEDICAID

## 2023-03-02 PROBLEM — Z37.9 NORMAL LABOR: Status: ACTIVE | Noted: 2023-03-02

## 2023-03-02 LAB
A1 MICROGLOB PLACENTAL VAG QL: POSITIVE
ABO GROUP BLD: NORMAL
AMPHET+METHAMPHET UR QL: NEGATIVE
BARBITURATES UR QL SCN: NEGATIVE
BASOPHILS # BLD AUTO: 0.04 10*3/MM3 (ref 0–0.2)
BASOPHILS NFR BLD AUTO: 0.3 % (ref 0–1.5)
BENZODIAZ UR QL SCN: NEGATIVE
BLD GP AB SCN SERPL QL: NEGATIVE
CANNABINOIDS SERPL QL: NEGATIVE
COCAINE UR QL: NEGATIVE
DEPRECATED RDW RBC AUTO: 47.9 FL (ref 37–54)
EOSINOPHIL # BLD AUTO: 0.14 10*3/MM3 (ref 0–0.4)
EOSINOPHIL NFR BLD AUTO: 1 % (ref 0.3–6.2)
ERYTHROCYTE [DISTWIDTH] IN BLOOD BY AUTOMATED COUNT: 13.9 % (ref 12.3–15.4)
HCT VFR BLD AUTO: 30.9 % (ref 34–46.6)
HGB BLD-MCNC: 10.7 G/DL (ref 12–15.9)
IMM GRANULOCYTES # BLD AUTO: 0.06 10*3/MM3 (ref 0–0.05)
IMM GRANULOCYTES NFR BLD AUTO: 0.4 % (ref 0–0.5)
LYMPHOCYTES # BLD AUTO: 1.57 10*3/MM3 (ref 0.7–3.1)
LYMPHOCYTES NFR BLD AUTO: 10.8 % (ref 19.6–45.3)
MCH RBC QN AUTO: 32.3 PG (ref 26.6–33)
MCHC RBC AUTO-ENTMCNC: 34.6 G/DL (ref 31.5–35.7)
MCV RBC AUTO: 93.4 FL (ref 79–97)
METHADONE UR QL SCN: NEGATIVE
MONOCYTES # BLD AUTO: 0.74 10*3/MM3 (ref 0.1–0.9)
MONOCYTES NFR BLD AUTO: 5.1 % (ref 5–12)
NEUTROPHILS NFR BLD AUTO: 11.94 10*3/MM3 (ref 1.7–7)
NEUTROPHILS NFR BLD AUTO: 82.4 % (ref 42.7–76)
NRBC BLD AUTO-RTO: 0 /100 WBC (ref 0–0.2)
OPIATES UR QL: NEGATIVE
OXYCODONE UR QL SCN: NEGATIVE
PLATELET # BLD AUTO: 288 10*3/MM3 (ref 140–450)
PMV BLD AUTO: 9.9 FL (ref 6–12)
RBC # BLD AUTO: 3.31 10*6/MM3 (ref 3.77–5.28)
RH BLD: POSITIVE
T&S EXPIRATION DATE: NORMAL
WBC NRBC COR # BLD: 14.49 10*3/MM3 (ref 3.4–10.8)

## 2023-03-02 PROCEDURE — 80307 DRUG TEST PRSMV CHEM ANLYZR: CPT | Performed by: OBSTETRICS & GYNECOLOGY

## 2023-03-02 PROCEDURE — 85025 COMPLETE CBC W/AUTO DIFF WBC: CPT | Performed by: OBSTETRICS & GYNECOLOGY

## 2023-03-02 PROCEDURE — 84112 EVAL AMNIOTIC FLUID PROTEIN: CPT | Performed by: OBSTETRICS & GYNECOLOGY

## 2023-03-02 PROCEDURE — 86901 BLOOD TYPING SEROLOGIC RH(D): CPT | Performed by: OBSTETRICS & GYNECOLOGY

## 2023-03-02 PROCEDURE — 59025 FETAL NON-STRESS TEST: CPT

## 2023-03-02 PROCEDURE — 3E033VJ INTRODUCTION OF OTHER HORMONE INTO PERIPHERAL VEIN, PERCUTANEOUS APPROACH: ICD-10-PCS | Performed by: OBSTETRICS & GYNECOLOGY

## 2023-03-02 PROCEDURE — 10907ZC DRAINAGE OF AMNIOTIC FLUID, THERAPEUTIC FROM PRODUCTS OF CONCEPTION, VIA NATURAL OR ARTIFICIAL OPENING: ICD-10-PCS | Performed by: OBSTETRICS & GYNECOLOGY

## 2023-03-02 PROCEDURE — C1755 CATHETER, INTRASPINAL: HCPCS | Performed by: ANESTHESIOLOGY

## 2023-03-02 PROCEDURE — 25010000002 PENICILLIN G POTASSIUM PER 600000 UNITS: Performed by: OBSTETRICS & GYNECOLOGY

## 2023-03-02 PROCEDURE — 86900 BLOOD TYPING SEROLOGIC ABO: CPT | Performed by: OBSTETRICS & GYNECOLOGY

## 2023-03-02 PROCEDURE — 86850 RBC ANTIBODY SCREEN: CPT | Performed by: OBSTETRICS & GYNECOLOGY

## 2023-03-02 PROCEDURE — 25010000002 FENTANYL CITRATE (PF) 50 MCG/ML SOLUTION: Performed by: ANESTHESIOLOGY

## 2023-03-02 PROCEDURE — G0463 HOSPITAL OUTPT CLINIC VISIT: HCPCS

## 2023-03-02 RX ORDER — FAMOTIDINE 10 MG/ML
20 INJECTION, SOLUTION INTRAVENOUS 2 TIMES DAILY
Status: DISCONTINUED | OUTPATIENT
Start: 2023-03-02 | End: 2023-03-05 | Stop reason: HOSPADM

## 2023-03-02 RX ORDER — SODIUM CHLORIDE, SODIUM LACTATE, POTASSIUM CHLORIDE, CALCIUM CHLORIDE 600; 310; 30; 20 MG/100ML; MG/100ML; MG/100ML; MG/100ML
125 INJECTION, SOLUTION INTRAVENOUS CONTINUOUS
Status: DISCONTINUED | OUTPATIENT
Start: 2023-03-02 | End: 2023-03-05 | Stop reason: HOSPADM

## 2023-03-02 RX ORDER — FENTANYL CITRATE 50 UG/ML
INJECTION, SOLUTION INTRAMUSCULAR; INTRAVENOUS
Status: COMPLETED
Start: 2023-03-02 | End: 2023-03-02

## 2023-03-02 RX ORDER — CARBOPROST TROMETHAMINE 250 UG/ML
250 INJECTION, SOLUTION INTRAMUSCULAR AS NEEDED
Status: DISCONTINUED | OUTPATIENT
Start: 2023-03-02 | End: 2023-03-03 | Stop reason: HOSPADM

## 2023-03-02 RX ORDER — ACETAMINOPHEN 325 MG/1
650 TABLET ORAL EVERY 4 HOURS PRN
Status: DISCONTINUED | OUTPATIENT
Start: 2023-03-02 | End: 2023-03-03 | Stop reason: HOSPADM

## 2023-03-02 RX ORDER — TRISODIUM CITRATE DIHYDRATE AND CITRIC ACID MONOHYDRATE 500; 334 MG/5ML; MG/5ML
30 SOLUTION ORAL ONCE AS NEEDED
Status: DISCONTINUED | OUTPATIENT
Start: 2023-03-02 | End: 2023-03-03 | Stop reason: HOSPADM

## 2023-03-02 RX ORDER — SODIUM CHLORIDE 0.9 % (FLUSH) 0.9 %
3 SYRINGE (ML) INJECTION EVERY 12 HOURS SCHEDULED
Status: DISCONTINUED | OUTPATIENT
Start: 2023-03-02 | End: 2023-03-03 | Stop reason: HOSPADM

## 2023-03-02 RX ORDER — SODIUM CHLORIDE 0.9 % (FLUSH) 0.9 %
10 SYRINGE (ML) INJECTION AS NEEDED
Status: DISCONTINUED | OUTPATIENT
Start: 2023-03-02 | End: 2023-03-03 | Stop reason: HOSPADM

## 2023-03-02 RX ORDER — OXYTOCIN/0.9 % SODIUM CHLORIDE 30/500 ML
2 PLASTIC BAG, INJECTION (ML) INTRAVENOUS
Status: DISCONTINUED | OUTPATIENT
Start: 2023-03-02 | End: 2023-03-05 | Stop reason: HOSPADM

## 2023-03-02 RX ORDER — LIDOCAINE HYDROCHLORIDE 10 MG/ML
5 INJECTION, SOLUTION EPIDURAL; INFILTRATION; INTRACAUDAL; PERINEURAL AS NEEDED
Status: DISCONTINUED | OUTPATIENT
Start: 2023-03-02 | End: 2023-03-03 | Stop reason: HOSPADM

## 2023-03-02 RX ORDER — ONDANSETRON 2 MG/ML
4 INJECTION INTRAMUSCULAR; INTRAVENOUS EVERY 6 HOURS PRN
Status: DISCONTINUED | OUTPATIENT
Start: 2023-03-02 | End: 2023-03-03 | Stop reason: HOSPADM

## 2023-03-02 RX ORDER — TERBUTALINE SULFATE 1 MG/ML
0.25 INJECTION, SOLUTION SUBCUTANEOUS AS NEEDED
Status: DISCONTINUED | OUTPATIENT
Start: 2023-03-02 | End: 2023-03-03 | Stop reason: HOSPADM

## 2023-03-02 RX ORDER — LIDOCAINE HYDROCHLORIDE AND EPINEPHRINE 15; 5 MG/ML; UG/ML
INJECTION, SOLUTION EPIDURAL
Status: COMPLETED
Start: 2023-03-02 | End: 2023-03-02

## 2023-03-02 RX ORDER — FENTANYL CITRATE 50 UG/ML
INJECTION, SOLUTION INTRAMUSCULAR; INTRAVENOUS
Status: COMPLETED | OUTPATIENT
Start: 2023-03-02 | End: 2023-03-02

## 2023-03-02 RX ORDER — METHYLERGONOVINE MALEATE 0.2 MG/ML
200 INJECTION INTRAVENOUS ONCE AS NEEDED
Status: DISCONTINUED | OUTPATIENT
Start: 2023-03-02 | End: 2023-03-03 | Stop reason: HOSPADM

## 2023-03-02 RX ORDER — EPHEDRINE SULFATE 50 MG/ML
5 INJECTION, SOLUTION INTRAVENOUS
Status: DISCONTINUED | OUTPATIENT
Start: 2023-03-02 | End: 2023-03-03 | Stop reason: HOSPADM

## 2023-03-02 RX ORDER — LIDOCAINE HYDROCHLORIDE AND EPINEPHRINE 15; 5 MG/ML; UG/ML
INJECTION, SOLUTION EPIDURAL
Status: COMPLETED | OUTPATIENT
Start: 2023-03-02 | End: 2023-03-02

## 2023-03-02 RX ORDER — FAMOTIDINE 10 MG/ML
20 INJECTION, SOLUTION INTRAVENOUS 2 TIMES DAILY
Status: DISCONTINUED | OUTPATIENT
Start: 2023-03-02 | End: 2023-03-02

## 2023-03-02 RX ORDER — MISOPROSTOL 200 UG/1
800 TABLET ORAL AS NEEDED
Status: DISCONTINUED | OUTPATIENT
Start: 2023-03-02 | End: 2023-03-03 | Stop reason: HOSPADM

## 2023-03-02 RX ORDER — ONDANSETRON 4 MG/1
4 TABLET, FILM COATED ORAL EVERY 6 HOURS PRN
Status: DISCONTINUED | OUTPATIENT
Start: 2023-03-02 | End: 2023-03-03 | Stop reason: HOSPADM

## 2023-03-02 RX ADMIN — FENTANYL CITRATE 100 MCG: 50 INJECTION, SOLUTION INTRAMUSCULAR; INTRAVENOUS at 21:09

## 2023-03-02 RX ADMIN — Medication 10 ML/HR: at 21:09

## 2023-03-02 RX ADMIN — SODIUM CHLORIDE, POTASSIUM CHLORIDE, SODIUM LACTATE AND CALCIUM CHLORIDE 125 ML/HR: 600; 310; 30; 20 INJECTION, SOLUTION INTRAVENOUS at 16:25

## 2023-03-02 RX ADMIN — LIDOCAINE HYDROCHLORIDE AND EPINEPHRINE 3 ML: 15; 5 INJECTION, SOLUTION EPIDURAL at 21:08

## 2023-03-02 RX ADMIN — Medication 2 MILLI-UNITS/MIN: at 18:00

## 2023-03-02 RX ADMIN — PENICILLIN G POTASSIUM 5 MILLION UNITS: 5000000 INJECTION, POWDER, FOR SOLUTION INTRAMUSCULAR; INTRAVENOUS at 16:49

## 2023-03-02 RX ADMIN — FAMOTIDINE 20 MG: 10 INJECTION INTRAVENOUS at 19:20

## 2023-03-02 RX ADMIN — PENICILLIN G POTASSIUM 2.5 MILLION UNITS: 5000000 INJECTION, POWDER, FOR SOLUTION INTRAMUSCULAR; INTRAVENOUS at 21:22

## 2023-03-02 RX ADMIN — LIDOCAINE HYDROCHLORIDE AND EPINEPHRINE 2 ML: 15; 5 INJECTION, SOLUTION EPIDURAL at 21:10

## 2023-03-02 RX ADMIN — EPHEDRINE SULFATE 5 MG: 50 INJECTION INTRAVENOUS at 23:27

## 2023-03-02 NOTE — TELEPHONE ENCOUNTER
Caller: Aubree Krueger    Relationship to patient: Self    Best call back number: [phone]    Chief complaint: PT BEILEVES HER WATER HAS BROKE, GOING TO [facility].    Patient directed to call 911 or go to their nearest emergency room.     Patient verbalized understanding: [x] Yes  [] No  If no, why?

## 2023-03-02 NOTE — TELEPHONE ENCOUNTER
Called pt to check on her, she states she is positive that her water broke about 20 mins ago, it is running down her legs. She is going to [facility] in [place] because it is closer to her house. Pt advised to call back if they discharge her and it was not her water that broke. FYI only

## 2023-03-02 NOTE — NURSING NOTE
Dr Joseph at nurse's station. Notified of patient arrival to unit.  at 35.1 weeks gestation. C/O SROM at 1440, clear fluid. SVE /-3. Amnsiure sent to lab. Amnisure positive. Patient gets care in [facility]. Patient does have history of recreational drug use and has been sober for 2.5 years. All labs in computer. GBS unknown due to gestational age. Allergy to sulfa drugs. Orders received from Dr. Joseph for admission.

## 2023-03-02 NOTE — NURSING NOTE
Dr Joseph called stating to start pitocin if no cervical change within two hours from initial cervical check. No change has been made. Pitocin ordered. SVE 2/50/-3. Forebag felt during cervical exam and Dr. Joseph notified.

## 2023-03-02 NOTE — H&P
REA Joiner  Obstetric History and Physical    Chief Complaint   Patient presents with   • Leaking Fluid       Subjective     HPI:    Patient is a 37 y.o. female  currently at 35w1d, who presents with SROM at 1230 - cl;  No vb;  Some cramping;  .    Her prenatal care is complicated by  sexually transmitted disease  hep C and advanced maternal age  genetic screening was normal.  Her previous obstetric/gynecological history is noted for is non-contributory.    The following portions of the patients history were reviewed and updated as appropriate:   current medications, allergies, past medical history, past surgical history, past family history, past social history and current problem list.     Prenatal Information:  Prenatal Results     POC Urine Glucose/Protein     Test Value Reference Range Date Time    Urine Glucose  Negative mg/dL Negative 23    Urine Protein  Negative mg/dL Negative 23          Initial Prenatal Labs     Test Value Reference Range Date Time    Hemoglobin  12.0 g/dL 12.0 - 15.9 10/18/22 2233       13.1 g/dL 11.1 - 15.9 22    Hematocrit  35.9 % 34.0 - 46.6 10/18/22 2233       39.3 % 34.0 - 46.6 22    Platelets  276 10*3/mm3 140 - 450 10/18/22 2233       250 x10E3/uL 150 - 450 2234    Rubella IgG  5.82 index Immune >0.99 22    Hepatitis B SAg  Negative  Negative 22    Hepatitis C Ab  >11.0 s/co ratio 0.0 - 0.9 22    RPR  Non Reactive  Non Reactive 2234    T. Pallidum Ab         ABO  AB   10/18/22 2233    Rh  Positive   10/18/22 2233    Antibody Screen  Negative   10/18/22 2233       Negative  Negative 2234    HIV  Non Reactive  Non Reactive 2234    Urine Culture  Final report   22 0920    Gonorrhea  Negative  Negative 2230       Negative  Negative 22    Chlamydia  Negative  Negative 22       Negative  Negative 22 0910    TSH  1.560 uIU/mL  0.270 - 4.200 12/14/22 1429    HgB A1c               2nd and 3rd Trimester     Test Value Reference Range Date Time    Hemoglobin (repeated)  10.2 g/dL 12.0 - 15.9 12/14/22 1429    Hematocrit (repeated)  30.1 % 34.0 - 46.6 12/14/22 1429    Platelets   259 10*3/mm3 140 - 450 12/14/22 1429       276 10*3/mm3 140 - 450 10/18/22 2233       250 x10E3/uL 150 - 450 09/13/22 0934    GCT  72 mg/dL 65 - 139 12/14/22 1429    Antibody Screen (repeated)        GTT Fasting        GTT 1 Hr        GTT 2 Hr        GTT 3 Hr        Group B Strep              Drug Screening     Test Value Reference Range Date Time    Amphetamine Screen  Negative ng/mL Pyjfpu=9545 02/13/23 0850    Barbiturate Screen  Negative ng/mL Oasgli=191 02/13/23 0850    Benzodiazepine Screen  Negative ng/mL Cuwqyy=992 02/13/23 0850    Methadone Screen  Negative ng/mL Tnsrhr=542 02/13/23 0850    Phencyclidine Screen  Negative ng/mL Cutoff=25 02/13/23 0850    Opiates Screen  Negative ng/mL Ocygnp=165 02/13/23 0850    THC Screen  Negative ng/mL Cutoff=50 02/13/23 0850    Cocaine Screen  Negative ng/mL Aqmagp=980 02/13/23 0850    Propoxyphene Screen  Negative ng/mL Kjpdhm=548 02/13/23 0850    Buprenorphine Screen        Methamphetamine Screen        Oxycodone Screen        Tricyclic Antidepressants Screen              Other (Risk screening)     Test Value Reference Range Date Time    Varicella IgG        Parvovirus IgG        CMV IgG        Cystic Fibrosis        Hemoglobin electrophoresis        NIPT        MSAFP-4        AFP (for NTD only)              Legend    ^: Historical                      External Prenatal Results     Pregnancy Outside Results - Transcribed From Office Records - See Scanned Records For Details     Test Value Date Time    ABO  AB  10/18/22 2233    Rh  Positive  10/18/22 2233    Antibody Screen  Negative  10/18/22 2233       Negative  09/13/22 0934    Varicella IgG       Rubella  5.82 index 09/13/22 0934    Hgb  10.2 g/dL 12/14/22 1429        12.0 g/dL 10/18/22 2233       13.1 g/dL 22 0934    Hct  30.1 % 22 1429       35.9 % 10/18/22 2233       39.3 % 22 0934    Glucose Fasting GTT       Glucose Tolerance Test 1 hour       Glucose Tolerance Test 3 hour       Gonorrhea (discrete)  Negative  22 0930       Negative  22 0910    Chlamydia (discrete)  Negative  22 0930       Negative  22 0910    RPR  Non Reactive  22 0934    VDRL       Syphilis Antibody       HBsAg  Negative  22 0934    Herpes Simplex Virus PCR       Herpes Simplex VIrus Culture       HIV  Non Reactive  22 0934    Hep C RNA Quant PCR       Hep C Antibody  >11.0 s/co ratio 22 0934    AFP       Group B Strep       GBS Susceptibility to Clindamycin       GBS Susceptibility to Erythromycin       Fetal Fibronectin       Genetic Testing, Maternal Blood             Drug Screening     Test Value Date Time    Urine Drug Screen       Amphetamine Screen  Negative ng/mL 23 0850    Barbiturate Screen  Negative ng/mL 23 0850    Benzodiazepine Screen  Negative ng/mL 23 0850    Methadone Screen  Negative ng/mL 23 0850    Phencyclidine Screen  Negative ng/mL 23 0850    Opiates Screen ^ Absent  21 1017    THC Screen ^ Absent  21 1017    Cocaine Screen       Propoxyphene Screen  Negative ng/mL 23 0850    Buprenorphine Screen ^ Absent  21 1017    Methamphetamine Screen       Oxycodone Screen       Tricyclic Antidepressants Screen             Legend    ^: Historical                         Past OB History:     OB History    Para Term  AB Living   3 2 2 0 0 2   SAB IAB Ectopic Molar Multiple Live Births   0 0 0 0 0 2      # Outcome Date GA Lbr Donavon/2nd Weight Sex Delivery Anes PTL Lv   3 Current            2 Term    3771 g (8 lb 5 oz) M Vag-Spont   TITA   1 Term    4082 g (9 lb) M Vag-Spont   TITA       Past Medical History: Past Medical History:   Diagnosis Date   • Anemia      1st pregnancy   • Bipolar disorder (HCC) 2000   • Depression    • Hepatitis C     finished tx 2021   • PONV (postoperative nausea and vomiting) 2004   • Substance abuse (HCC) 2007   • Varicella       Past Surgical History Past Surgical History:   Procedure Laterality Date   • KNEE SURGERY Left     2 surgeries   • WISDOM TOOTH EXTRACTION  2007      Family History: Family History   Problem Relation Age of Onset   • Breast cancer Paternal Aunt    • Breast cancer Paternal Aunt       Social History:  reports that she has been smoking cigarettes. She has a 15.00 pack-year smoking history. She has never used smokeless tobacco.   reports no history of alcohol use.   reports that she does not currently use drugs after having used the following drugs: Fentanyl, Heroin, and Marijuana.        General ROS: Pertinent items are noted in HPI  Home Medications:  acetaminophen, aspirin, ferrous sulfate, and prenatal vitamin    Allergies:  Allergies   Allergen Reactions   • Sulfa Antibiotics Anaphylaxis       Objective       Vital Signs Range for the last 24 hours:  AFVSS  Temperature:     Temp Source:     BP:     Pulse:     Respirations:     SPO2:       Physical Examination:   General appearance - alert, well appearing, and in no distress  Mental status - alert, oriented to person, place, and time  Abdomen - soft, nontender, nondistended,   Pelvic - normal external genitalia, vulva, vagina, cervix, and uterus   Back exam - full range of motion, no tenderness or pain on motion  Neurological - alert, oriented, normal speech  Extremities - peripheral pulses normal  Skin - normal coloration and turgor, no suspicious skin lesions noted    Presentation: vtx   Cervix: Method: sterile exam per RN   Dilation: Cervical Dilation (cm): 2   Effacement: Cervical Effacement: 70-80%   Station:         Fetal Heart Rate Assessment :  140s, mod LTV, occ aubrey decels, cat 2  Method:     Beats/min:     Baseline:     Variability:     Accels:     Decels:      Tracing Category:       Uterine Assessment q 3-5min  Method:     Frequency (min):     Ctx Count in 10 min:     Duration:     Intensity:     [place] Units:       GBS is unknown     Assessment & Plan       Normal labor        Assessment:  1.  Intrauterine pregnancy at 35w1d gestation with reassuring fetal status.    2.  labor  with ROM  3.  Obstetrical history significant for is non-contributory.  4.  GBS status: No results found for: STREPGPB    Plan:  1. expectant management, analgesia with  epidural and antibiotic for GBS;  Will reposition for tracing  2.  Plan of care has been reviewed with patient and patient agrees.   3.  Risks, benefits of treatment plan have been discussed.  4.  All questions have been answered.        Electronically signed by Kari Joseph MD, 03/02/23, 4:00 PM EST.

## 2023-03-03 ENCOUNTER — PATIENT OUTREACH (OUTPATIENT)
Dept: LABOR AND DELIVERY | Facility: HOSPITAL | Age: 38
End: 2023-03-03
Payer: MEDICAID

## 2023-03-03 PROCEDURE — 51702 INSERT TEMP BLADDER CATH: CPT

## 2023-03-03 PROCEDURE — 25010000002 PENICILLIN G POTASSIUM PER 600000 UNITS: Performed by: OBSTETRICS & GYNECOLOGY

## 2023-03-03 RX ORDER — ONDANSETRON 4 MG/1
4 TABLET, FILM COATED ORAL EVERY 6 HOURS PRN
Status: DISCONTINUED | OUTPATIENT
Start: 2023-03-03 | End: 2023-03-03 | Stop reason: HOSPADM

## 2023-03-03 RX ORDER — ACETAMINOPHEN 325 MG/1
650 TABLET ORAL EVERY 6 HOURS PRN
Status: DISCONTINUED | OUTPATIENT
Start: 2023-03-03 | End: 2023-03-05 | Stop reason: HOSPADM

## 2023-03-03 RX ORDER — OXYTOCIN/0.9 % SODIUM CHLORIDE 30/500 ML
250 PLASTIC BAG, INJECTION (ML) INTRAVENOUS CONTINUOUS
Status: ACTIVE | OUTPATIENT
Start: 2023-03-03 | End: 2023-03-03

## 2023-03-03 RX ORDER — SODIUM CHLORIDE 0.9 % (FLUSH) 0.9 %
1-10 SYRINGE (ML) INJECTION AS NEEDED
Status: DISCONTINUED | OUTPATIENT
Start: 2023-03-03 | End: 2023-03-05 | Stop reason: HOSPADM

## 2023-03-03 RX ORDER — DOCUSATE SODIUM 100 MG/1
100 CAPSULE, LIQUID FILLED ORAL 2 TIMES DAILY
Status: DISCONTINUED | OUTPATIENT
Start: 2023-03-03 | End: 2023-03-05 | Stop reason: HOSPADM

## 2023-03-03 RX ORDER — IBUPROFEN 600 MG/1
600 TABLET ORAL EVERY 6 HOURS PRN
Status: DISCONTINUED | OUTPATIENT
Start: 2023-03-03 | End: 2023-03-03 | Stop reason: HOSPADM

## 2023-03-03 RX ORDER — CALCIUM CARBONATE 200(500)MG
2 TABLET,CHEWABLE ORAL 3 TIMES DAILY PRN
Status: DISCONTINUED | OUTPATIENT
Start: 2023-03-03 | End: 2023-03-05 | Stop reason: HOSPADM

## 2023-03-03 RX ORDER — ONDANSETRON 2 MG/ML
4 INJECTION INTRAMUSCULAR; INTRAVENOUS EVERY 6 HOURS PRN
Status: DISCONTINUED | OUTPATIENT
Start: 2023-03-03 | End: 2023-03-03 | Stop reason: HOSPADM

## 2023-03-03 RX ORDER — OXYTOCIN/0.9 % SODIUM CHLORIDE 30/500 ML
999 PLASTIC BAG, INJECTION (ML) INTRAVENOUS ONCE
Status: DISCONTINUED | OUTPATIENT
Start: 2023-03-03 | End: 2023-03-03 | Stop reason: HOSPADM

## 2023-03-03 RX ORDER — BISACODYL 10 MG
10 SUPPOSITORY, RECTAL RECTAL DAILY PRN
Status: DISCONTINUED | OUTPATIENT
Start: 2023-03-04 | End: 2023-03-05 | Stop reason: HOSPADM

## 2023-03-03 RX ADMIN — ACETAMINOPHEN 650 MG: 325 TABLET ORAL at 09:09

## 2023-03-03 RX ADMIN — ACETAMINOPHEN 650 MG: 325 TABLET ORAL at 18:44

## 2023-03-03 RX ADMIN — DOCUSATE SODIUM 100 MG: 100 CAPSULE, LIQUID FILLED ORAL at 09:06

## 2023-03-03 RX ADMIN — PENICILLIN G POTASSIUM 2.5 MILLION UNITS: 5000000 INJECTION, POWDER, FOR SOLUTION INTRAMUSCULAR; INTRAVENOUS at 01:16

## 2023-03-03 NOTE — PLAN OF CARE
Problem: Adjustment to Role Transition (Postpartum Vaginal Delivery)  Goal: Successful Maternal Role Transition  Outcome: Ongoing, Progressing     Problem: Bleeding (Postpartum Vaginal Delivery)  Goal: Hemostasis  Outcome: Ongoing, Progressing     Problem: Infection (Postpartum Vaginal Delivery)  Goal: Absence of Infection Signs/Symptoms  Outcome: Ongoing, Progressing     Problem: Pain (Postpartum Vaginal Delivery)  Goal: Acceptable Pain Control  Outcome: Ongoing, Progressing  Intervention: Prevent or Manage Pain  Recent Flowsheet Documentation  Taken 3/3/2023 0900 by Keri Catalan, RN  Pain Management Interventions: see MAR     Problem: Urinary Retention (Postpartum Vaginal Delivery)  Goal: Effective Urinary Elimination  Outcome: Ongoing, Progressing   Goal Outcome Evaluation:

## 2023-03-03 NOTE — L&D DELIVERY NOTE
Joiner  Vaginal Delivery Note    Delivery     Delivery: Vaginal, Spontaneous     YOB: 2021    Time of Birth:  Gestational Age 3:05 AM   35w2d     Anesthesia: Epidural     Delivering clinician:     Forceps?   No   Vacuum? No    Shoulder dystocia present: No        Delivery narrative:  I was called to the room as the patient was complete complete +2 station.  The patient underwent a spontaneous vaginal delivery of a viable female  at 0305 hrs.  The infant was bulb suctioned.  The cord was clamped x2 and cut after at least 60 seconds.  The placenta was delivered spontaneously and intact.  The weight was 5  pounds and 8 ounces and the Apgars were 8 and 9.   The vaginal and cervical exams were within normal limits.  The infant was in the warmer and mom was in recovery in stable and satisfactory condition.  The sponge counts and instrument counts were verified as correct.    Infant    Findings: female  infant     Infant observations: Weight: 2500 g (5 lb 8.2 oz)   Length: 18.5  in  Observations/Comments:        Apgars: 8  @ 1 minute /    9  @ 5 minutes         Placenta, Cord, and Fluid    Placenta delivered  Spontaneous  at   3/3/2023  3:11 AM     Cord: 3 vessels  present.   Nuchal Cord?  no   Cord blood obtained: Yes    Cord gases obtained:  No    Cord gas results: Venous:  No results found for: PHCVEN    Arterial:  No results found for: PHCART     Repair    Episiotomy: None     No    Lacerations: No   Estimated Blood Loss: Est. Blood Loss (mL): 50 mL (Filed from Delivery Summary) (23 0305)         Complications  none    Disposition  Mother to Mother Baby/Postpartum  in stable condition currently.  Baby to remains with mom in stable condition currently.    Electronically signed by Kari Joseph MD, 23, 3:18 AM EST.

## 2023-03-03 NOTE — ANESTHESIA PREPROCEDURE EVALUATION
Anesthesia Evaluation     Patient summary reviewed and Nursing notes reviewed   history of anesthetic complications:    NPO Liquid Status: > 2 hours           Airway   Mallampati: I  TM distance: >3 FB  Neck ROM: full  No difficulty expected  Comment: Studs on face nose lips  Dental      Pulmonary - negative pulmonary ROS and normal exam    breath sounds clear to auscultation  Cardiovascular - negative cardio ROS and normal exam  Exercise tolerance: good (4-7 METS)    Rhythm: regular  Rate: normal        Neuro/Psych  (+) psychiatric history,    GI/Hepatic/Renal/Endo    (+)  GERD well controlled,  hepatitis C, liver disease,     Musculoskeletal (-) negative ROS    Abdominal    Substance History - negative use      Comment: On suboxone hx heroine   OB/GYN    (+) Pregnant,         Other - negative ROS       ROS/Med Hx Other:                    Anesthesia Plan    ASA 3     epidural       Anesthetic plan, risks, benefits, and alternatives have been provided, discussed and informed consent has been obtained with: patient.  Pre-procedure education provided  Use of blood products discussed with patient  Consented to blood products.       CODE STATUS:    Level Of Support Discussed With: Patient  Code Status (Patient has no pulse and is not breathing): CPR (Attempt to Resuscitate)  Medical Interventions (Patient has pulse or is breathing): Full

## 2023-03-03 NOTE — OUTREACH NOTE
[facility]  IP Postpartum    Questions/Answers    Flowsheet Row Responses   Best Method for Contacting Cell   Support Person Present No   Does the patient have a car seat at the hospital Yes   Delivery Note Reviewed Reviewed   Were birth expectations met? No   Birth Expectations Not Met Comment Early delivery, Infant having trouble keeping temperature up   Lactation Note Reviewed Other   Note Reviewed Other Comment bottle feeding   Any questions or concerns? No   Is the patient going to use Meds to Beds? No   Any concerns related discharge meds/ability to  prescriptions? No   OB Discharge Navigator Reviewed  Not Reviewed   Comment Just delivered, no discharge order yet   Confirm Postpartum OB appointment Yes   Postpartum OB appointment date 23   Confirm initial well-child Pediatrician appointment date/time: No   Does patient have transportation to appointments? Yes   Any other assistance needed to ensure she is able to attend appointments? No   Does patient have supplies needed at home for  care? Clothing, Crib, Diapers          Met with on Mother/Baby in hospital. States she is tired. Infant in nursery due to concerns with temperature control. Infant is . She has decided to bottle feed and infant is on Neosure. Information given on formula resources and handout given. States will be following up with [facility] for pediatrician. Denies any current needs or concerns. Car seat at hospital. States significant other is out taking care of other child. Postpartum information sheet given and notified that Linnea will follow up with here next Friday via phone call.     Lubna Lindsey RN  Maternity Nurse Navigator    3/3/2023, 14:39 EST

## 2023-03-03 NOTE — ANESTHESIA PROCEDURE NOTES
Labor Epidural    Pre-sedation assessment completed: 3/2/2023 8:58 PM    Patient reassessed immediately prior to procedure    Patient location during procedure: OB  Start Time: 3/2/2023 8:58 PM  Performed By  Anesthesiologist: Reyes, Mirabelle, DO  Preanesthetic Checklist  Completed: patient identified, IV checked, risks and benefits discussed, surgical consent, monitors and equipment checked, pre-op evaluation and timeout performed  Prep:  Pt Position:sitting  Sterile Tech:cap, gloves, sterile barrier, mask and gown  Prep:chlorhexidine gluconate and isopropyl alcohol  Monitoring:blood pressure monitoring, continuous pulse oximetry and EKG  Epidural Block Procedure:  Approach:midline  Guidance:landmark technique and palpation technique  Location:L3-L4  Needle Type:Tuohy  Needle Gauge:17 G  Loss of Resistance Medium: saline  Loss of Resistance: 4cm  Cath Depth at skin:9 cm  Paresthesia: none  Aspiration:negative  Test Dose:negative  Medication: lidocaine 1.5%-EPINEPHrine 1:200,000 (XYLOCAINE W/EPI) injection - Epidural, Back   3 mL - 3/2/2023 9:08:00 PM   2 mL - 3/2/2023 9:10:00 PM  fentaNYL citrate (PF) (SUBLIMAZE) injection - Epidural   100 mcg - 3/2/2023 9:09:00 PM  Number of Attempts: 1  Post Assessment:  Dressing:occlusive dressing applied and secured with tape  Pt Tolerance:patient tolerated the procedure well with no apparent complications  Complications:no

## 2023-03-03 NOTE — PLAN OF CARE
Problem: Adult Inpatient Plan of Care  Goal: Plan of Care Review  Outcome: Adequate for Care Transition  Goal: Patient-Specific Goal (Individualized)  Outcome: Adequate for Care Transition  Goal: Absence of Hospital-Acquired Illness or Injury  Outcome: Adequate for Care Transition  Goal: Optimal Comfort and Wellbeing  Outcome: Adequate for Care Transition  Goal: Readiness for Transition of Care  Outcome: Adequate for Care Transition     Problem: Bleeding (Labor)  Goal: Hemostasis  Outcome: Adequate for Care Transition     Problem: Change in Fetal Wellbeing (Labor)  Goal: Stable Fetal Wellbeing  Outcome: Adequate for Care Transition     Problem: Delayed Labor Progression (Labor)  Goal: Effective Progression to Delivery  Outcome: Adequate for Care Transition     Problem: Infection (Labor)  Goal: Absence of Infection Signs and Symptoms  Outcome: Adequate for Care Transition     Problem: Labor Pain (Labor)  Goal: Acceptable Pain Control  Outcome: Adequate for Care Transition     Problem: Uterine Tachysystole (Labor)  Goal: Normal Uterine Contraction Pattern  Outcome: Adequate for Care Transition   Goal Outcome Evaluation:

## 2023-03-03 NOTE — PROGRESS NOTES
[facility]  Obstetric Progress Note    Subjective     Patient:    Pt comf with E    Objective     Vital Signs Range for the last 24 hours  Temp:  [98.2 °F (36.8 °C)-98.5 °F (36.9 °C)] 98.2 °F (36.8 °C)   Temp src: Oral   BP: (102-112)/(53-74) 102/70   Heart Rate:  [71-89] 73   Resp:  [18] 18        Flowsheet Rows    Flowsheet Row First Filed Value   Admission Height --   Admission Weight 66.2 kg (146 lb) Documented at 03/02/2023 1546          No intake or output data in the 24 hours ending 03/02/23 2203    No intake/output data recorded.    Physical Exam:  General: Patient is well appearing         Cervix:    Dilation: 4   Effacement: 75   Station:  -2     Fetal Heart Rate Assessment   Method: Fetal HR Assessment Method: external   Beats/min: Fetal HR (beats/min): 145   Baseline: Fetal HR Baseline: normal range   Variability: Fetal HR Variability: moderate (amplitude range 6 to 25 bpm)   Accels: Fetal HR Accelerations: absent   Decels: Fetal HR Decelerations: variable, late     Uterine Assessment   Method: Method: palpation, external tocotransducer   Frequency (min): Contraction Frequency (Minutes): 2-3   Ctx Count in 10 min:     Duration:     Intensity: Contraction Intensity: mild by palpation   Intensity by IUPC:     Resting Tone: Uterine Resting Tone: soft by palpation   Resting Tone by IUPC:     [place] Units:         Assessment & Plan       Normal labor        Assessment:  1.  Intrauterine pregnancy at 35w1d gestation with reactive fetal status.        Plan:  1. Continue Pitocin, AROM forebag  2. Plan of care has been reviewed with patient   3.  All questions have been answered.      Electronically signed by Kari Joseph MD, 03/02/23, 10:03 PM EST.

## 2023-03-04 LAB
DEPRECATED RDW RBC AUTO: 47.6 FL (ref 37–54)
ERYTHROCYTE [DISTWIDTH] IN BLOOD BY AUTOMATED COUNT: 13.8 % (ref 12.3–15.4)
HCT VFR BLD AUTO: 29.3 % (ref 34–46.6)
HGB BLD-MCNC: 9.9 G/DL (ref 12–15.9)
MCH RBC QN AUTO: 31.7 PG (ref 26.6–33)
MCHC RBC AUTO-ENTMCNC: 33.8 G/DL (ref 31.5–35.7)
MCV RBC AUTO: 93.9 FL (ref 79–97)
PLATELET # BLD AUTO: 248 10*3/MM3 (ref 140–450)
PMV BLD AUTO: 10.1 FL (ref 6–12)
RBC # BLD AUTO: 3.12 10*6/MM3 (ref 3.77–5.28)
WBC NRBC COR # BLD: 7.5 10*3/MM3 (ref 3.4–10.8)

## 2023-03-04 PROCEDURE — 85027 COMPLETE CBC AUTOMATED: CPT | Performed by: STUDENT IN AN ORGANIZED HEALTH CARE EDUCATION/TRAINING PROGRAM

## 2023-03-04 NOTE — PROGRESS NOTES
[facility]  Vaginal Delivery Progress Note    Subjective   Aubree Krueger is a 37 y.o.  who is postpartum day 1 from a uncomplicated .   The patient feels well with no concerns.  Her pain is well controlled with nonsteroidal anti-inflammatory drugs and Tylenol.   She is ambulating well.  Patient describes her bleeding as moderate lochia.    Objective     Vital Signs Range for the last 24 hours  Temperature: Temp:  [97.2 °F (36.2 °C)-98.6 °F (37 °C)] 97.3 °F (36.3 °C)   Temp Source: Temp src: Oral   BP: BP: ()/(47-71) 105/62   Pulse: Heart Rate:  [54-75] 64   Respirations: Resp:  [14-18] 14       Physical Exam:  General:  no acute distress.  Abdomen: abdomen is soft without significant tenderness, masses, organomegaly or guarding.   Fundus: appropriate, firm, non tender, small lochia   Extremities: normal, atraumatic, no cyanosis, and trace edema.     Rubella:   Rubella Antibodies, IgG   Date Value Ref Range Status   2022 5.82 Immune >0.99 index Final     Comment:                                     Non-immune       <0.90                                  Equivocal  0.90 - 0.99                                  Immune           >0.99       Rh Status:    RH type   Date Value Ref Range Status   2023 Positive  Final     Rh Factor   Date Value Ref Range Status   2022 Positive  Final     Comment:     Please note: Prior records for this patient's ABO / Rh type are not  available for additional verification.       Immunizations:   Immunization History   Administered Date(s) Administered   • COVID-19 (MODERNA) 1st, 2nd, 3rd Dose Only 2021, 04/15/2021   • Fluzone Quad >6mos (Multi-dose) 2020   • Hepatitis A 2020   • PPD Test 10/12/2020   • Tdap 10/11/2018, 2023       Assessment & Plan       Normal labor      Routine postpartum  - Breastfeeding: going well  - Contraception: desires Nexplanon  - Pain well controlled  - Bleeding well controlled    Hepatitis C  - First  diagnosed 10/2020, quant around 700 at that time  - s/p definitive treatment   - recommend follow up with GI physician    History of spontaneous  labor  - recommend serial cervical lengths next pregnancy starting at 16 weeks until 24 weeks.       Plan:  Continue current care.      Electronically signed by Kulwinder Soliz MD, 23, 7:04 AM EST.

## 2023-03-04 NOTE — PLAN OF CARE
Problem: Adult Inpatient Plan of Care  Goal: Absence of Hospital-Acquired Illness or Injury  Intervention: Identify and Manage Fall Risk  Recent Flowsheet Documentation  Taken 3/4/2023 0800 by Vineet Birmingham RN  Safety Promotion/Fall Prevention: safety round/check completed  Intervention: Prevent Skin Injury  Recent Flowsheet Documentation  Taken 3/4/2023 0800 by Vineet Birmingham RN  Body Position: position changed independently  Intervention: Prevent and Manage VTE (Venous Thromboembolism) Risk  Recent Flowsheet Documentation  Taken 3/4/2023 0800 by Vineet Birmingham RN  Activity Management: up ad linda  Goal: Optimal Comfort and Wellbeing  Intervention: Monitor Pain and Promote Comfort  Recent Flowsheet Documentation  Taken 3/4/2023 0800 by Vineet Birmingham RN  Pain Management Interventions:   care clustered   pain management plan reviewed with patient/caregiver   position adjusted   quiet environment facilitated   no interventions per patient request  Intervention: Provide Person-Centered Care  Recent Flowsheet Documentation  Taken 3/4/2023 0800 by Vineet Birmingham RN  Trust Relationship/Rapport:   care explained   choices provided   emotional support provided   empathic listening provided   questions answered   questions encouraged   reassurance provided   thoughts/feelings acknowledged     Problem: Change in Fetal Wellbeing (Labor)  Goal: Stable Fetal Wellbeing  Intervention: Promote and Monitor Fetal Wellbeing  Recent Flowsheet Documentation  Taken 3/4/2023 0800 by Vineet Birmingham RN  Body Position: position changed independently     Problem: Adjustment to Role Transition (Postpartum Vaginal Delivery)  Goal: Successful Maternal Role Transition  Outcome: Ongoing, Progressing     Problem: Bleeding (Postpartum Vaginal Delivery)  Goal: Hemostasis  Outcome: Ongoing, Progressing     Problem: Infection (Postpartum Vaginal Delivery)  Goal: Absence of Infection Signs/Symptoms  Outcome: Ongoing, Progressing     Problem:  Pain (Postpartum Vaginal Delivery)  Goal: Acceptable Pain Control  Outcome: Ongoing, Progressing  Intervention: Prevent or Manage Pain  Recent Flowsheet Documentation  Taken 3/4/2023 0800 by Vineet Birmingham RN  Pain Management Interventions:   care clustered   pain management plan reviewed with patient/caregiver   position adjusted   quiet environment facilitated   no interventions per patient request     Problem: Urinary Retention (Postpartum Vaginal Delivery)  Goal: Effective Urinary Elimination  Outcome: Ongoing, Progressing   Goal Outcome Evaluation:

## 2023-03-04 NOTE — ANESTHESIA POSTPROCEDURE EVALUATION
Patient: Aubree Krueger    Procedure Summary     Date: 03/02/23 Room / Location:     Anesthesia Start: 2058 Anesthesia Stop: 03/03/23 0305    Procedure: LABOR ANALGESIA Diagnosis:     Scheduled Providers:  Provider: Reyes, Mirabelle, DO    Anesthesia Type: epidural ASA Status: 3          Anesthesia Type: epidural    Vitals  Vitals Value Taken Time   /62 03/04/23 0540   Temp 36.3 °C (97.3 °F) 03/04/23 0540   Pulse 64 03/04/23 0540   Resp 14 03/04/23 0540   SpO2 100 % 03/03/23 0513   Vitals shown include unvalidated device data.        Post Anesthesia Care and Evaluation    Patient location during evaluation: bedside  Patient participation: complete - patient participated  Level of consciousness: awake  Pain management: adequate    Airway patency: patent  Anesthetic complications: No anesthetic complications  PONV Status: controlled  Cardiovascular status: acceptable and stable  Respiratory status: acceptable  Hydration status: acceptable  Post Neuraxial Block status: Motor and sensory function returned to baseline and No signs or symptoms of PDPH

## 2023-03-04 NOTE — PLAN OF CARE
Problem: Adjustment to Role Transition (Postpartum Vaginal Delivery)  Goal: Successful Maternal Role Transition  Outcome: Ongoing, Progressing     Problem: Bleeding (Postpartum Vaginal Delivery)  Goal: Hemostasis  Outcome: Ongoing, Progressing     Problem: Infection (Postpartum Vaginal Delivery)  Goal: Absence of Infection Signs/Symptoms  Outcome: Ongoing, Progressing     Problem: Pain (Postpartum Vaginal Delivery)  Goal: Acceptable Pain Control  Outcome: Ongoing, Progressing     Problem: Urinary Retention (Postpartum Vaginal Delivery)  Goal: Effective Urinary Elimination  Outcome: Ongoing, Progressing   Goal Outcome Evaluation:

## 2023-03-05 VITALS
WEIGHT: 146 LBS | HEART RATE: 63 BPM | TEMPERATURE: 97.9 F | OXYGEN SATURATION: 100 % | DIASTOLIC BLOOD PRESSURE: 61 MMHG | SYSTOLIC BLOOD PRESSURE: 100 MMHG | BODY MASS INDEX: 22.2 KG/M2 | RESPIRATION RATE: 16 BRPM

## 2023-03-05 RX ORDER — IBUPROFEN 600 MG/1
600 TABLET ORAL EVERY 6 HOURS PRN
Qty: 30 TABLET | Refills: 0 | Status: SHIPPED | OUTPATIENT
Start: 2023-03-05

## 2023-03-05 NOTE — DISCHARGE SUMMARY
[facility]  Delivery Discharge Summary    Primary OB Clinician: Dr. Kelley    EDC: Estimated Date of Delivery: 23    Admitting Diagnosis:  Normal labor [O80, Z37.9]    Discharge Diagnosis:  Same as Admitting plus:   Pregnancy at 35w2d - Delivered     Antepartum complications: Chronic hepatitis C.  Bipolar disorder.  History of drug abuse    Date of Delivery: 3/3/2023   Time of Delivery: 3:05 AM     Delivered By:  Kari Joseph     Delivery Type: Vaginal, Spontaneous      Tubal Ligation: n/a    Baby:female  infant;   Apgar:  8  @ 1 minute /   Apgar:  9  @ 5 minutes   Weight: 2500 g (5 lb 8.2 oz)    Length: 18.5     Anesthesia: Epidural      Intrapartum complications: None    Laceration: No    Episiotomy: No    Placenta: Spontaneous     Feeding method: Breastfeeding Status: Unknown     Discharge exam:  Patient is afebrile vital signs stable  Lungs are clear to auscultation bilaterally  Heart is regular rate and rhythm  Abdomen soft nontender and the uterus firm at U -3 station    Discharge Date: 3/5/2023; Discharge Time: 09:39 EST        Plan:      Follow-up appointment with patient's obstetrician

## 2023-03-10 ENCOUNTER — PATIENT OUTREACH (OUTPATIENT)
Dept: LABOR AND DELIVERY | Facility: HOSPITAL | Age: 38
End: 2023-03-10
Payer: MEDICAID

## 2023-03-10 NOTE — OUTREACH NOTE
[facility]  Postpartum Check-In    Questions/Answers    Flowsheet Row Responses   Visit Setting Telephone   Best Method for Contacting Cell   OB Discharge Note Reviewed  Reviewed   OB Discharge Medications Reviewed  Reviewed    discharged home with mother? Infant in ICU   Current Pain Levels 0-10 0   At Rest Pain Levels 0-10 0   Pain level with activity 0-10 0   Acceptable Pain Level 0-10 0   Verbalized Emotional State Acceptance   Family/Support Network Significant Other   Level of Involvement in Care Attentive, Interactive, Supportive   Do you feel comfortable in your relationship with your baby? Yes   Have members of your household adjusted to your baby? Yes   Is the baby's father supportive and/or involved with the baby? Yes   How does your partner feel about the baby? Happy, Involved   Do you feel safe at home, school and work? Yes   Are you in a relationship with someone who threatens you or hurts you? No   Do you have the resources to keep yourself and your baby healthy and safe? Yes   Lochia (per patient report) Brown-nerissa Red   Amount Scant   Number of pads per day 3   Lochia Odor None   Is patient breastfeeding? No   How is breast suppression going? still sore   Postpartum Depression Screening Education Education Provided   Doctor Appointments: Education Provided   Family Planning Education Education Provided   Postpartum Care Education Education Provided   S & S to report Education Provided   Followup Appointments Made Yes   Well Child Visit Appointments Made Yes   Appointment Date 23   Provider/Agency Link   Well Child Checkup Provider Name Rosas Sinai   Well Child Check Up Date: 23   Did you complete the visit? Yes   Were there any specific concerns? Yes   Concerns: went Mon and Tues for jaundice   Umbilical Cord No reported signs or symptoms   Was the baby circumcised? No   Infant Feeding Method Formula   Is a lactation referral indicated? No   Formula Type --  [similac  advanced]   Formula PO (mL) 30 mL   Formula/Expressed Milk frequency of feedings: 3 hours   Number of wet diapers x 24 hours 7   Last BM x 24 hours 1   What safe sleep surface is available? Bassinet  [when at home this week ]   Are there stuffed animals, toys, pillows, quilts, blankets, wedges, positioners, bumpers or other loose bedding in the infant's sleeping environment? No   Where does the baby usually sleep? Bassinet   Does the baby ever share a sleep surface with a sibling, adult or pet? No   Does the baby ever share a sleep surface in a bed, couch, recliner or other? No   What position do you place your baby to sleep for naps? Back   What position do you place your baby to sleep at night Back   Are you and/or other caregivers smoking inside or outside the baby's home? No   Is the infant dressed appropiately for the temperature of the home? Yes   Do you use a clean, dry pacifier that is not attached to a string or stuffed animal? Yes          Review of Systems    [place]  Depression Score: 11 (3/10/2023 10:38 AM)    Called pt and reviewed her experience delivering at [place]. Infant was home with her this week but recently admitted to the NICU for low temps. Pt denies any resource needs at this time. We completed her EPDS and she states that she feels well supported at this time and has her appt to see her mental health provider to restart her meds for bipolar soon. She is also continuing with weekly group therapy. She reports no pain and minimal bleeding. Will send to RN call center.     Linnea ROBERTSON - RN  Maternity Nurse Navigator    3/10/2023, 10:53 EST

## 2023-03-17 ENCOUNTER — PATIENT OUTREACH (OUTPATIENT)
Dept: CALL CENTER | Facility: HOSPITAL | Age: 38
End: 2023-03-17
Payer: MEDICAID

## 2023-03-17 NOTE — OUTREACH NOTE
[facility] Survey    Flowsheet Row Responses   [facility] patient discharged from? [facility]   Week 1 attempt successful? Yes   Call end time 1052   Baby sex Girl   [place] discharged home with mother? Yes   Baby sex Girl   Delivery type Vaginal   Emotional state Acceptance   Family support Yes   Do you have all necessary resources to care for you and your baby?  Yes   Have members of your household adjusted to your baby? Yes   Feeding Method Bottle   Frequency EVERY 3 HOURS   Amount 2-3 OUNCES   Breast Condition No   Nipple Condition No   Signs baby is ready to eat Crying   Feeding tolerance Wet/dirty diapers, Weight gain   Number of wet diapers x 24 hours 8-10   Last BM x 24 hours 3   Umbilical Cord No reported signs or symptoms   Where does the baby usually sleep? Bassinet   Are there stuffed animals, toys, pillows, quilts, blankets, wedges, positioners, bumpers or other loose bedding in the infant's sleeping environment? No   Does the baby ever share a sleep surface in a bed, couch, recliner or other? No   What position do you lay your baby down to sleep? Back   Are you and/or other caregivers smoking inside or outside the baby's home? No   Mom appointment comments: MOM HAS HER OBGYN APPOINTMENT SCHEDULED   Baby appointment comments: BABY HAS BEEN SEEN BY THE PEDIATRICIAN   Call completed? Yes   How satisfied were you with the Motherhood Connection Program? 5            More ROBERTSON - Licensed Nurse

## 2023-04-12 ENCOUNTER — POSTPARTUM VISIT (OUTPATIENT)
Dept: OBSTETRICS AND GYNECOLOGY | Age: 38
End: 2023-04-12
Payer: MEDICAID

## 2023-04-12 VITALS
SYSTOLIC BLOOD PRESSURE: 100 MMHG | DIASTOLIC BLOOD PRESSURE: 64 MMHG | BODY MASS INDEX: 19.4 KG/M2 | WEIGHT: 128 LBS | HEIGHT: 68 IN

## 2023-04-12 DIAGNOSIS — F31.62 BIPOLAR DISORDER, CURRENT EPISODE MIXED, MODERATE: ICD-10-CM

## 2023-04-12 DIAGNOSIS — B18.2 CHRONIC HEPATITIS C WITHOUT HEPATIC COMA: ICD-10-CM

## 2023-04-12 DIAGNOSIS — F11.10 HEROIN ABUSE: ICD-10-CM

## 2023-04-12 RX ORDER — BUPROPION HYDROCHLORIDE 150 MG/1
1 TABLET ORAL DAILY
COMMUNITY
Start: 2023-03-13 | End: 2023-04-17

## 2023-04-12 NOTE — PROGRESS NOTES
Subjective       History of Present Illness  Aubree Krueger is a 38 y.o. female is being seen today for postpartum visit.  Patient delivered quickly and prematurely at 34 weeks in [facility].  She had an uncomplicated vaginal delivery.  She has had an uneventful recovery.  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   .        The following portions of the patient's history were reviewed and updated as appropriate: allergies, current medications, past family history, past medical history, past social history, past surgical history and problem list.    PAST MEDICAL HISTORY  Past Medical History:   Diagnosis Date   • Anemia     1st pregnancy   • Bipolar disorder    • Depression    • Hepatitis C     finished tx    • PONV (postoperative nausea and vomiting)    • Substance abuse    • Varicella      OB History    Para Term  AB Living   3 3 2 1   3   SAB IAB Ectopic Molar Multiple Live Births           0 3      # Outcome Date GA Lbr Don[place]on/2nd Weight Sex Delivery Anes PTL Lv   3  23 35w2d 12:10 / 00:15 2500 g (5 lb 8.2 oz) F Vag-Spont EPI Y TITA   2 Term    3771 g (8 lb 5 oz) M Vag-Spont   TITA   1 Term    4082 g (9 lb) M Vag-Spont   TITA     Past Surgical History:   Procedure Laterality Date   • KNEE SURGERY Left     2 surgeries   • WISDOM TOOTH EXTRACTION       Family History   Problem Relation Age of Onset   • Breast cancer Paternal Aunt    • Breast cancer Paternal Aunt      Social History     Tobacco Use   Smoking Status Heavy Smoker   • Packs/day: 1.00   • Years: 15.00   • Pack  years: 15.00   • Types: Cigarettes   Smokeless Tobacco Never   Tobacco Comments    Workin on quittting       Current Outpatient Medications:   •  buPROPion XL (WELLBUTRIN XL) 150 MG 24 hr tablet, Take 1 tablet by mouth Daily., Disp: , Rfl:   •  ferrous sulfate 325 (65 FE) MG tablet, Take 1 tablet by mouth Daily With Breakfast., Disp: 90 tablet, Rfl: 3  •  ibuprofen (ADVIL,MOTRIN) 600 MG tablet, Take 1 tablet by mouth Every 6 (Six) Hours As Needed for Mild Pain., Disp: 30 tablet, Rfl: 0  •  prenatal vitamin (prenatal, CLASSIC, vitamin) tablet, Take  by mouth Daily. (Patient not taking: Reported on 4/12/2023), Disp: , Rfl:   Immunization History   Administered Date(s) Administered   • COVID-19 (MODERNA) 1st, 2nd, 3rd Dose Only 03/18/2021, 04/15/2021   • Fluzone Quad >6mos (Multi-dose) 11/19/2020   • Hepatitis A 12/31/2020   • PPD Test 10/12/2020   • Tdap 10/11/2018, 01/16/2023       Review of Systems       Except as outlined in history of physical illness, patient denies any changes in her GYN, , GI systems. All other systems reviewed are negative.    Objective   Physical Exam   Alert and oriented, respirations unlabored, heart regular rate and rhythm   Pelvic external genitalia normal female vagina clean dry intact Pap smear was performed cervix uterus adnexa nonenlarged nontender      Assessment & Plan   Diagnoses and all orders for this visit:    1. Postpartum care and examination (Primary)  -     IGP, Apt HPV,rfx 16 / 18,45    2. Heroin abuse  Follow-up in drug court note written to return to work  3. Chronic hepatitis C without hepatic coma  Patient following up with PCP  4. Bipolar disorder, current episode mixed, moderate  Stable    Tentative scheduling for Nexplanon insertion in the next 1 to 2 weeks               No orders of the defined types were placed in this encounter.          EMR Dragon/ Transcription disclaimer:  Much of the encounter note is an electronic transcription/translation of spoken  language to printed text. The electronic translation of spoken language may permit erroneous, or at times, nonessential words or phrases to be inadvertently transcribes; Although i have reviewed the note for such errors, some may still exist.  Answers for HPI/ROS submitted by the patient on 4/5/2023  Please describe your symptoms.: Postpartum  Have you had these symptoms before?: Yes  How long have you been having these symptoms?: Greater than 2 weeks  Please list any medications you are currently taking for this condition.: Na  Please describe any probable cause for these symptoms. : Na  What is the primary reason for your visit?: Other

## 2023-04-17 ENCOUNTER — OFFICE VISIT (OUTPATIENT)
Dept: OBSTETRICS AND GYNECOLOGY | Age: 38
End: 2023-04-17
Payer: MEDICAID

## 2023-04-17 VITALS
BODY MASS INDEX: 19.1 KG/M2 | DIASTOLIC BLOOD PRESSURE: 64 MMHG | SYSTOLIC BLOOD PRESSURE: 112 MMHG | WEIGHT: 126 LBS | HEIGHT: 68 IN

## 2023-04-17 DIAGNOSIS — Z30.017 NEXPLANON INSERTION: Primary | ICD-10-CM

## 2023-04-17 LAB
B-HCG UR QL: NEGATIVE
EXPIRATION DATE: NORMAL
INTERNAL NEGATIVE CONTROL: NORMAL
INTERNAL POSITIVE CONTROL: NORMAL
Lab: NORMAL

## 2023-04-17 RX ORDER — BUPROPION HYDROCHLORIDE 300 MG/1
1 TABLET ORAL DAILY
COMMUNITY
Start: 2023-04-13

## 2023-04-17 NOTE — PROGRESS NOTES
Nexplanon Insertion    No LMP recorded.    Date of procedure:  4/17/2023    Risks and benefits discussed? yes  All questions answered? yes  Consents given by the patient  Written consent obtained? yes    Local anesthesia used:  yes    Procedure documentation:     Urine pregnancy test was done and was NEGATIVE .  The risks (including infection,  bruising, irregular bleeding, pain at insertion site, and injury to muscles, nerves  and blood vessesl) and benefits of the procedure were explained to the patient  and/or guardian and Written informed consent was obtained. She especially  understands that her menstrual periods are expected to become irregular and  unpredictable throughout the time she is using the implant.  She has no  contraindications to the insertion.  Her questions have been answered.  She has  fully reviewed the FDA-approved consent brochure, has signed the consent  form, and wishes to proceed with the insertion today.     The upper left arm (non-dominant) was marked at the intended site of insertion.  Betadine was used to cleanse the skin.  1 % lidocaine without epinephrine was injected.  The Nexplanon was placed subdermally without difficulty and according to manufacturers instructions.  The device was able to be palpated in the arm by both myself and the patient.  Steri-strips and band aid were then placed across the site of insertion and a pressure bandage was applied.    She tolerated the procedure well.  There were no complications.  EBL was minimal.    Post procedure instructions:          The patient was advised to call for any rash, arm pain, fever, warmth or for prolonged bruising or bleeding. Remove the wrapping in 24 hours and the steri-strips in 5 days.    Follow up needed: PRN    She is bleeding today, has not been SA yet  Cleared for SA  Disc bleeding irregularities with nexplanon, call for any issues  Doing well, daughter is eating well and gaining weight appropriately

## 2023-04-19 LAB
CYTOLOGIST CVX/VAG CYTO: NORMAL
CYTOLOGY CVX/VAG DOC CYTO: NORMAL
CYTOLOGY CVX/VAG DOC THIN PREP: NORMAL
DX ICD CODE: NORMAL
HIV 1 & 2 AB SER-IMP: NORMAL
HPV I/H RISK 4 DNA CVX QL PROBE+SIG AMP: NEGATIVE
OTHER STN SPEC: NORMAL
STAT OF ADQ CVX/VAG CYTO-IMP: NORMAL

## 2023-10-13 ENCOUNTER — TELEPHONE (OUTPATIENT)
Dept: FAMILY MEDICINE CLINIC | Facility: CLINIC | Age: 38
End: 2023-10-13
Payer: MEDICAID

## 2023-10-16 NOTE — TELEPHONE ENCOUNTER
HUB TO READ:    This is to be discussed with OBGYN. PCP does not do removals.    VM not set up @ 2237

## 2023-10-18 ENCOUNTER — OFFICE VISIT (OUTPATIENT)
Dept: OBSTETRICS AND GYNECOLOGY | Age: 38
End: 2023-10-18
Payer: MEDICAID

## 2023-10-18 VITALS
DIASTOLIC BLOOD PRESSURE: 72 MMHG | WEIGHT: 122 LBS | HEIGHT: 68 IN | BODY MASS INDEX: 18.49 KG/M2 | SYSTOLIC BLOOD PRESSURE: 106 MMHG

## 2023-10-18 DIAGNOSIS — Z30.46 NEXPLANON REMOVAL: Primary | ICD-10-CM

## 2023-10-18 RX ORDER — ETONOGESTREL 68 MG/1
1 IMPLANT SUBCUTANEOUS ONCE
COMMUNITY
End: 2023-10-18 | Stop reason: SINTOL

## 2023-10-18 NOTE — TELEPHONE ENCOUNTER
HUB TO RELAY. Attempted to contact patient in regards to needing to see her OBGYN for requested procedure. VM not set up.

## 2023-10-18 NOTE — PROGRESS NOTES
Nexplanon Removal Procedure Note    Pre-operative Diagnosis: Nexplanon complication bleeding and desires pregnancy    Post-operative Diagnosis: same    Indications: same    Procedure Details   The risks (including infection, bruising, irregular bleeding, pain at removal site, and injury to muscles, nerves and blood vessels) and benefits of the procedure were explained to the patient and/or guardian and written informed consent was obtained.      Nexplanon device was easily located by palpation of inner arm.  The device insertion site was painted with betadine and allowed to dry.  Device site anesthetized with 3 ml 2% lidocaine with epi.  End of device was located and 11 blade was used to make small incision.  Device was located in subcutaneous  tissue, grasped with hemostat and removed intact. Incision site was closed with steri-strips and band aid.   Pressure dressing applied.  There were no complications.      Pt tolerated procedure well      Condition:  Stable    Complications:  None    Plan:    The patient was advised to call for any rash, arm pain, fever, warmth or for prolonged bruising or bleeding. She was advised to use OTC acetaminophen as needed for mild to moderate pain.   Can remove pressure dressing in 24 hours. Band aid and steri strips in 2-3 days    Plan PNV if planning pregnancy

## 2023-12-06 ENCOUNTER — OFFICE VISIT (OUTPATIENT)
Dept: OBSTETRICS AND GYNECOLOGY | Age: 38
End: 2023-12-06
Payer: MEDICAID

## 2023-12-06 VITALS
WEIGHT: 121 LBS | SYSTOLIC BLOOD PRESSURE: 102 MMHG | HEIGHT: 68 IN | BODY MASS INDEX: 18.34 KG/M2 | DIASTOLIC BLOOD PRESSURE: 60 MMHG

## 2023-12-06 DIAGNOSIS — Z13.29 SCREENING FOR THYROID DISORDER: ICD-10-CM

## 2023-12-06 DIAGNOSIS — N92.6 IRREGULAR BLEEDING: ICD-10-CM

## 2023-12-06 DIAGNOSIS — R63.4 WEIGHT LOSS: Primary | ICD-10-CM

## 2023-12-06 DIAGNOSIS — Z83.3 FAMILY HISTORY OF DIABETES MELLITUS: ICD-10-CM

## 2023-12-06 PROCEDURE — 1160F RVW MEDS BY RX/DR IN RCRD: CPT | Performed by: PHYSICIAN ASSISTANT

## 2023-12-06 PROCEDURE — 1159F MED LIST DOCD IN RCRD: CPT | Performed by: PHYSICIAN ASSISTANT

## 2023-12-06 PROCEDURE — 99213 OFFICE O/P EST LOW 20 MIN: CPT | Performed by: PHYSICIAN ASSISTANT

## 2023-12-06 RX ORDER — FERROUS SULFATE 325(65) MG
325 TABLET ORAL
COMMUNITY

## 2023-12-06 NOTE — PROGRESS NOTES
"Subjective     Chief Complaint   Patient presents with    Menstrual Problem     C/o irregular periods since nexplanon removal in October. C/o had a periods after the nexplanon removal and very large clots, bleeding through a super plus tampon every 1.5 hours, then she started again two weeks later.  ** was feeling light headed and started her iron again.  Bp the other day was very low.       Aubree Krueger is a 38 y.o.  whose LMP is Patient's last menstrual period was 2023. presents with irregular bleeding    She had her nexplanon removed in October  Had been spotting with it so opted for removal  Spotting stopped shortly after removal    Then had a \"period\" in November that lasted 11-12 days with heavy bleeding and clots  Stopped and then started bleeding again for 5-6 days  +clots but smaller  Did start iron beginning of November  Was feeling dizzy and weak  Has taken a pregnancy test that was negative  Is trying for pregnancy    Also notes weight loss and hair loss  Did deliver 9 months ago    Denies h/o abnormal periods    No Additional Complaints Reported    The following portions of the patient's history were reviewed and updated as appropriate:no additional history reviewed, vital signs, allergies, current medications, past medical history, past social history, past surgical history, and problem list      Review of Systems   Genitourinary:positive for irregular bleeding     Objective      /60   Ht 172.7 cm (68\")   Wt 54.9 kg (121 lb)   LMP 2023 Comment: last period 2023--  BMI 18.40 kg/m²     Physical Exam    General:   alert, comfortable, and no distress   Heart: Not performed today   Lungs: Not performed today.   Breast: Not performed today   Neck: Not performed today   Abdomen: Not performed today   CVA: Not performed today   Pelvis: Not performed today   Extremities: Not performed today   Neurologic: negative   Psychiatric: Normal affect, judgement, and mood "       Lab Review   Labs: No data reviewed    Imaging   Ultrasound - Pelvic Vaginal  1.  Uterus: Normal size, with uterine volume of 81 ml, with uterine dimensions 7 x 4 x 4 cm, and Retroverted     2.  Endometrium: Normal non-menopausal thickness and endometrial fluid present, 1 mm      3.  Myometrium: Normal homogenous texture      4.  Ovaries             Left: Normal/unremarkable , Normal small follicles, and with ovarian volume: 4 ml              Right: Normal/unremarkable , Normal small follicles, and with ovarian volume: 8 ml         Assessment & Plan     ASSESSMENT  1. Weight loss    2. Screening for thyroid disorder    3. Irregular bleeding    4. Family history of diabetes mellitus          PLAN  1.   Orders Placed This Encounter   Procedures    TSH    Hemoglobin A1c    Ferritin    HCG, B-subunit, Quantitative    CBC & Differential       2. Labs ordered. If wnl, can monitor bleeding. Could offer provera if irregular cycle persists. Would recommend tracking cycle as well. C/w iron. Needs to add PNV as well    U/s reviewed and essentially normal    Follow up: TAMERA Tang  12/6/2023

## 2023-12-07 LAB
BASOPHILS # BLD AUTO: 0 X10E3/UL (ref 0–0.2)
BASOPHILS NFR BLD AUTO: 1 %
EOSINOPHIL # BLD AUTO: 0.1 X10E3/UL (ref 0–0.4)
EOSINOPHIL NFR BLD AUTO: 2 %
ERYTHROCYTE [DISTWIDTH] IN BLOOD BY AUTOMATED COUNT: 13 % (ref 11.7–15.4)
FERRITIN SERPL-MCNC: 27 NG/ML (ref 15–150)
HBA1C MFR BLD: 5.2 % (ref 4.8–5.6)
HCG INTACT+B SERPL-ACNC: <1 MIU/ML
HCT VFR BLD AUTO: 39.2 % (ref 34–46.6)
HGB BLD-MCNC: 13 G/DL (ref 11.1–15.9)
IMM GRANULOCYTES # BLD AUTO: 0 X10E3/UL (ref 0–0.1)
IMM GRANULOCYTES NFR BLD AUTO: 0 %
LYMPHOCYTES # BLD AUTO: 1.9 X10E3/UL (ref 0.7–3.1)
LYMPHOCYTES NFR BLD AUTO: 30 %
MCH RBC QN AUTO: 30.6 PG (ref 26.6–33)
MCHC RBC AUTO-ENTMCNC: 33.2 G/DL (ref 31.5–35.7)
MCV RBC AUTO: 92 FL (ref 79–97)
MONOCYTES # BLD AUTO: 0.4 X10E3/UL (ref 0.1–0.9)
MONOCYTES NFR BLD AUTO: 6 %
NEUTROPHILS # BLD AUTO: 3.9 X10E3/UL (ref 1.4–7)
NEUTROPHILS NFR BLD AUTO: 61 %
PLATELET # BLD AUTO: 283 X10E3/UL (ref 150–450)
RBC # BLD AUTO: 4.25 X10E6/UL (ref 3.77–5.28)
TSH SERPL DL<=0.005 MIU/L-ACNC: 3.27 UIU/ML (ref 0.45–4.5)
WBC # BLD AUTO: 6.4 X10E3/UL (ref 3.4–10.8)

## 2024-03-29 ENCOUNTER — APPOINTMENT (OUTPATIENT)
Dept: CT IMAGING | Facility: HOSPITAL | Age: 39
End: 2024-03-29
Payer: MEDICAID

## 2024-03-29 ENCOUNTER — HOSPITAL ENCOUNTER (EMERGENCY)
Facility: HOSPITAL | Age: 39
Discharge: HOME | End: 2024-03-29
Attending: EMERGENCY MEDICINE
Payer: MEDICAID

## 2024-03-29 VITALS
TEMPERATURE: 98.7 F | HEIGHT: 68 IN | DIASTOLIC BLOOD PRESSURE: 80 MMHG | WEIGHT: 121.25 LBS | SYSTOLIC BLOOD PRESSURE: 111 MMHG | HEART RATE: 50 BPM | BODY MASS INDEX: 18.38 KG/M2 | OXYGEN SATURATION: 100 % | RESPIRATION RATE: 16 BRPM

## 2024-03-29 DIAGNOSIS — R10.33 PERIUMBILICAL ABDOMINAL PAIN: Primary | ICD-10-CM

## 2024-03-29 DIAGNOSIS — R10.11 RIGHT UPPER QUADRANT ABDOMINAL PAIN: ICD-10-CM

## 2024-03-29 DIAGNOSIS — R19.7 DIARRHEA, UNSPECIFIED TYPE: ICD-10-CM

## 2024-03-29 LAB
ALBUMIN SERPL-MCNC: 4.3 G/DL (ref 3.5–5.2)
ALBUMIN/GLOB SERPL: 1.7 G/DL
ALP SERPL-CCNC: 83 U/L (ref 39–117)
ALT SERPL W P-5'-P-CCNC: 18 U/L (ref 1–33)
ANION GAP SERPL CALCULATED.3IONS-SCNC: 8.2 MMOL/L (ref 5–15)
AST SERPL-CCNC: 15 U/L (ref 1–32)
BASOPHILS # BLD AUTO: 0.02 10*3/MM3 (ref 0–0.2)
BASOPHILS NFR BLD AUTO: 0.2 % (ref 0–1.5)
BILIRUB SERPL-MCNC: 0.3 MG/DL (ref 0–1.2)
BILIRUB UR QL STRIP: NEGATIVE
BUN SERPL-MCNC: 9 MG/DL (ref 6–20)
BUN/CREAT SERPL: 10 (ref 7–25)
CALCIUM SPEC-SCNC: 10 MG/DL (ref 8.6–10.5)
CHLORIDE SERPL-SCNC: 107 MMOL/L (ref 98–107)
CLARITY UR: ABNORMAL
CO2 SERPL-SCNC: 23.8 MMOL/L (ref 22–29)
COLOR UR: YELLOW
CREAT SERPL-MCNC: 0.9 MG/DL (ref 0.57–1)
DEPRECATED RDW RBC AUTO: 45.7 FL (ref 37–54)
EGFRCR SERPLBLD CKD-EPI 2021: 84.6 ML/MIN/1.73
EOSINOPHIL # BLD AUTO: 0.06 10*3/MM3 (ref 0–0.4)
EOSINOPHIL NFR BLD AUTO: 0.7 % (ref 0.3–6.2)
ERYTHROCYTE [DISTWIDTH] IN BLOOD BY AUTOMATED COUNT: 14.1 % (ref 12.3–15.4)
GLOBULIN UR ELPH-MCNC: 2.6 GM/DL
GLUCOSE SERPL-MCNC: 90 MG/DL (ref 65–99)
GLUCOSE UR STRIP-MCNC: NEGATIVE MG/DL
HCG INTACT+B SERPL-ACNC: <0.5 MIU/ML
HCT VFR BLD AUTO: 37 % (ref 34–46.6)
HGB BLD-MCNC: 11.9 G/DL (ref 12–15.9)
HGB UR QL STRIP.AUTO: NEGATIVE
HOLD SPECIMEN: NORMAL
HOLD SPECIMEN: NORMAL
IMM GRANULOCYTES # BLD AUTO: 0.02 10*3/MM3 (ref 0–0.05)
IMM GRANULOCYTES NFR BLD AUTO: 0.2 % (ref 0–0.5)
KETONES UR QL STRIP: NEGATIVE
LEUKOCYTE ESTERASE UR QL STRIP.AUTO: NEGATIVE
LIPASE SERPL-CCNC: 32 U/L (ref 13–60)
LYMPHOCYTES # BLD AUTO: 1.94 10*3/MM3 (ref 0.7–3.1)
LYMPHOCYTES NFR BLD AUTO: 23.4 % (ref 19.6–45.3)
MCH RBC QN AUTO: 28.7 PG (ref 26.6–33)
MCHC RBC AUTO-ENTMCNC: 32.2 G/DL (ref 31.5–35.7)
MCV RBC AUTO: 89.2 FL (ref 79–97)
MONOCYTES # BLD AUTO: 0.32 10*3/MM3 (ref 0.1–0.9)
MONOCYTES NFR BLD AUTO: 3.9 % (ref 5–12)
NEUTROPHILS NFR BLD AUTO: 5.93 10*3/MM3 (ref 1.7–7)
NEUTROPHILS NFR BLD AUTO: 71.6 % (ref 42.7–76)
NITRITE UR QL STRIP: NEGATIVE
NRBC BLD AUTO-RTO: 0 /100 WBC (ref 0–0.2)
PH UR STRIP.AUTO: 7 [PH] (ref 5–8)
PLATELET # BLD AUTO: 319 10*3/MM3 (ref 140–450)
PMV BLD AUTO: 9.1 FL (ref 6–12)
POTASSIUM SERPL-SCNC: 4.1 MMOL/L (ref 3.5–5.2)
PROT SERPL-MCNC: 6.9 G/DL (ref 6–8.5)
PROT UR QL STRIP: ABNORMAL
RBC # BLD AUTO: 4.15 10*6/MM3 (ref 3.77–5.28)
SODIUM SERPL-SCNC: 139 MMOL/L (ref 136–145)
SP GR UR STRIP: 1.03 (ref 1–1.03)
UROBILINOGEN UR QL STRIP: ABNORMAL
WBC NRBC COR # BLD AUTO: 8.29 10*3/MM3 (ref 3.4–10.8)
WHOLE BLOOD HOLD COAG: NORMAL
WHOLE BLOOD HOLD SPECIMEN: NORMAL

## 2024-03-29 PROCEDURE — 84702 CHORIONIC GONADOTROPIN TEST: CPT | Performed by: EMERGENCY MEDICINE

## 2024-03-29 PROCEDURE — 85025 COMPLETE CBC W/AUTO DIFF WBC: CPT | Performed by: EMERGENCY MEDICINE

## 2024-03-29 PROCEDURE — 25810000003 SODIUM CHLORIDE 0.9 % SOLUTION

## 2024-03-29 PROCEDURE — 25510000001 IOPAMIDOL PER 1 ML: Performed by: EMERGENCY MEDICINE

## 2024-03-29 PROCEDURE — 80053 COMPREHEN METABOLIC PANEL: CPT | Performed by: EMERGENCY MEDICINE

## 2024-03-29 PROCEDURE — 74177 CT ABD & PELVIS W/CONTRAST: CPT

## 2024-03-29 PROCEDURE — 96374 THER/PROPH/DIAG INJ IV PUSH: CPT

## 2024-03-29 PROCEDURE — 36415 COLL VENOUS BLD VENIPUNCTURE: CPT

## 2024-03-29 PROCEDURE — 25010000002 KETOROLAC TROMETHAMINE PER 15 MG

## 2024-03-29 PROCEDURE — 83690 ASSAY OF LIPASE: CPT | Performed by: EMERGENCY MEDICINE

## 2024-03-29 PROCEDURE — 81003 URINALYSIS AUTO W/O SCOPE: CPT | Performed by: EMERGENCY MEDICINE

## 2024-03-29 PROCEDURE — 99285 EMERGENCY DEPT VISIT HI MDM: CPT

## 2024-03-29 RX ORDER — KETOROLAC TROMETHAMINE 30 MG/ML
30 INJECTION, SOLUTION INTRAMUSCULAR; INTRAVENOUS ONCE
Status: COMPLETED | OUTPATIENT
Start: 2024-03-29 | End: 2024-03-29

## 2024-03-29 RX ORDER — DICYCLOMINE HYDROCHLORIDE 10 MG/1
20 CAPSULE ORAL ONCE
Status: COMPLETED | OUTPATIENT
Start: 2024-03-29 | End: 2024-03-29

## 2024-03-29 RX ORDER — SODIUM CHLORIDE 0.9 % (FLUSH) 0.9 %
10 SYRINGE (ML) INJECTION AS NEEDED
Status: DISCONTINUED | OUTPATIENT
Start: 2024-03-29 | End: 2024-03-29 | Stop reason: HOSPADM

## 2024-03-29 RX ORDER — DICYCLOMINE HCL 20 MG
20 TABLET ORAL EVERY 8 HOURS PRN
Qty: 20 TABLET | Refills: 0 | Status: SHIPPED | OUTPATIENT
Start: 2024-03-29

## 2024-03-29 RX ADMIN — SODIUM CHLORIDE 1000 ML: 9 INJECTION, SOLUTION INTRAVENOUS at 19:49

## 2024-03-29 RX ADMIN — IOPAMIDOL 100 ML: 755 INJECTION, SOLUTION INTRAVENOUS at 20:02

## 2024-03-29 RX ADMIN — DICYCLOMINE HYDROCHLORIDE 20 MG: 10 CAPSULE ORAL at 21:16

## 2024-03-29 RX ADMIN — KETOROLAC TROMETHAMINE 30 MG: 30 INJECTION, SOLUTION INTRAMUSCULAR; INTRAVENOUS at 19:52

## 2024-03-29 NOTE — ED PROVIDER NOTES
Time: 6:55 PM EDT  Date of encounter:  3/29/2024  Room number: 09/09  Independent Historian/Clinical History and Information was obtained by:   Patient    History is limited by: N/A    Chief Complaint: back pain and flank pain       History of Present Illness:  Patient is a 39 y.o. year old female who presents to the emergency department for evaluation of right flank pain that radiates to her back. Pain is reported to have started yesterday and has gotten worse. She has had no vomiting or nausea but has had a few episodes of diarrhea.  She also describes an increase in urine frequency however has had no burning and no increase in urgency.    HPI    Patient Care Team  Primary Care Provider: Kelly Little APRN    Past Medical History:     Allergies   Allergen Reactions    Sulfa Antibiotics Anaphylaxis     Past Medical History:   Diagnosis Date    Anemia 2003 1st pregnancy    Bipolar disorder 2000    Depression     Hepatitis C     finished tx 2021    PONV (postoperative nausea and vomiting) 2004    Substance abuse 2007    Varicella      Past Surgical History:   Procedure Laterality Date    KNEE SURGERY Left     2 surgeries    WISDOM TOOTH EXTRACTION  2007     Family History   Problem Relation Age of Onset    Breast cancer Paternal Aunt     Breast cancer Paternal Aunt        Home Medications:  Prior to Admission medications    Medication Sig Start Date End Date Taking? Authorizing Provider   ferrous sulfate 325 (65 FE) MG tablet Take 1 tablet by mouth Daily With Breakfast.    Provider, Joleen, MD        Social History:   Social History     Tobacco Use    Smoking status: Heavy Smoker     Current packs/day: 1.00     Average packs/day: 1 pack/day for 15.0 years (15.0 ttl pk-yrs)     Types: Cigarettes     Passive exposure: Never    Smokeless tobacco: Never    Tobacco comments:     Workin on quittting   Vaping Use    Vaping status: Former    Quit date: 6/13/2022    Substances: Nicotine   Substance Use Topics     "Alcohol use: No    Drug use: Not Currently     Types: Fentanyl, Heroin, Marijuana     Comment: Quit in 2020         Review of Systems:  Review of Systems   Constitutional:  Negative for chills and fever.   HENT:  Negative for congestion, ear pain and sore throat.    Eyes:  Negative for pain.   Respiratory:  Negative for cough, chest tightness and shortness of breath.    Cardiovascular:  Negative for chest pain.   Gastrointestinal:  Positive for diarrhea (2 episodes). Negative for abdominal pain, nausea and vomiting.   Genitourinary:  Positive for flank pain. Negative for hematuria.   Musculoskeletal:  Negative for joint swelling.   Skin:  Negative for pallor.   Neurological:  Negative for seizures and headaches.   All other systems reviewed and are negative.       Physical Exam:  /69 (Patient Position: Lying)   Pulse 56   Temp 98.7 °F (37.1 °C) (Oral)   Resp 14   Ht 172.7 cm (68\")   Wt 55 kg (121 lb 4.1 oz)   LMP 03/24/2024 (Approximate)   SpO2 99%   BMI 18.44 kg/m²     Physical Exam  Vitals and nursing note reviewed.   Constitutional:       General: She is not in acute distress.     Appearance: Normal appearance. She is not ill-appearing, toxic-appearing or diaphoretic.   HENT:      Head: Normocephalic and atraumatic.      Mouth/Throat:      Mouth: Mucous membranes are moist.   Eyes:      General: No scleral icterus.  Cardiovascular:      Rate and Rhythm: Normal rate and regular rhythm.      Pulses: Normal pulses.      Heart sounds: Normal heart sounds.   Pulmonary:      Effort: Pulmonary effort is normal. No respiratory distress.      Breath sounds: Normal breath sounds. No stridor. No wheezing, rhonchi or rales.   Chest:      Chest wall: No tenderness.   Abdominal:      General: Abdomen is flat. There is no distension.      Palpations: Abdomen is soft.      Tenderness: There is abdominal tenderness (right side and periumbilica).   Musculoskeletal:         General: No swelling or tenderness. Normal " range of motion.      Cervical back: Normal range of motion and neck supple.   Skin:     General: Skin is warm and dry.      Coloration: Skin is not jaundiced or pale.      Findings: No bruising, erythema, lesion or rash.   Neurological:      Mental Status: She is alert and oriented to person, place, and time. Mental status is at baseline.                  Procedures:  Procedures      Medical Decision Making:      Comorbidities that affect care:    Hep C, anemia, depression, bipolar, substance abuse    External Notes reviewed:          The following orders were placed and all results were independently analyzed by me:  Orders Placed This Encounter   Procedures    CT Abdomen Pelvis With Contrast    Baker Draw    Comprehensive Metabolic Panel    Lipase    Urinalysis With Microscopic If Indicated (No Culture) - Urine, Clean Catch    hCG, Quantitative, Pregnancy    CBC Auto Differential    NPO Diet NPO Type: Strict NPO    Undress & Gown    Insert Peripheral IV    CBC & Differential    Green Top (Gel)    Lavender Top    Gold Top - SST    Light Blue Top       Medications Given in the Emergency Department:  Medications   sodium chloride 0.9 % flush 10 mL (has no administration in time range)   dicyclomine (BENTYL) capsule 20 mg (has no administration in time range)   ketorolac (TORADOL) injection 30 mg (30 mg Intravenous Given 3/29/24 1952)   sodium chloride 0.9 % bolus 1,000 mL (1,000 mL Intravenous New Bag 3/29/24 1949)   iopamidol (ISOVUE-370) 76 % injection 100 mL (100 mL Intravenous Given 3/29/24 2002)        ED Course:    ED Course as of 03/29/24 2056   Fri Mar 29, 2024   2044 Pt updated with CT results. She advises that she continues to discomfort and the pain medication did not help very much [MS]      ED Course User Index  [MS] Darcie Madison APRN       Labs:    Lab Results (last 24 hours)       Procedure Component Value Units Date/Time    CBC & Differential [[id number]]  (Abnormal) Collected: 03/29/24  1850    Specimen: Blood Updated: 03/29/24 1859    Narrative:      The following orders were created for panel order CBC & Differential.  Procedure                               Abnormality         Status                     ---------                               -----------         ------                     CBC Auto Differential[[id number]]        Abnormal            Final result                 Please view results for these tests on the individual orders.    Comprehensive Metabolic Panel [[id number]] Collected: 03/29/24 1850    Specimen: Blood Updated: 03/29/24 1919     Glucose 90 mg/dL      BUN 9 mg/dL      Creatinine 0.90 mg/dL      Sodium 139 mmol/L      Potassium 4.1 mmol/L      Chloride 107 mmol/L      CO2 23.8 mmol/L      Calcium 10.0 mg/dL      Total Protein 6.9 g/dL      Albumin 4.3 g/dL      ALT (SGPT) 18 U/L      AST (SGOT) 15 U/L      Alkaline Phosphatase 83 U/L      Total Bilirubin 0.3 mg/dL      Globulin 2.6 gm/dL      A/G Ratio 1.7 g/dL      BUN/Creatinine Ratio 10.0     Anion Gap 8.2 mmol/L      eGFR 84.6 mL/min/1.73     Narrative:      GFR Normal >60  Chronic Kidney Disease <60  Kidney Failure <15      Lipase [[id number]]  (Normal) Collected: 03/29/24 1850    Specimen: Blood Updated: 03/29/24 1919     Lipase 32 U/L     Urinalysis With Microscopic If Indicated (No Culture) - Urine, Clean Catch [[id number]]  (Abnormal) Collected: 03/29/24 1850    Specimen: Urine, Clean Catch Updated: 03/29/24 1900     Color, UA Yellow     Appearance, UA Cloudy     pH, UA 7.0     Specific Gravity, UA 1.027     Glucose, UA Negative     Ketones, UA Negative     Bilirubin, UA Negative     Blood, UA Negative     Protein, UA Trace     Leuk Esterase, UA Negative     Nitrite, UA Negative     Urobilinogen, UA 1.0 E.U./dL    Narrative:      Urine microscopic not indicated.    hCG, Quantitative, Pregnancy [[id number]] Collected: 03/29/24 1850    Specimen: Blood Updated: 03/29/24 1917     HCG Quantitative <0.50 mIU/mL     Narrative:       HCG Ranges by Gestational Age    Females - non-pregnant premenopausal   </= 1mIU/mL HCG  Females - postmenopausal               </= 7mIU/mL HCG    3 Weeks       5.4   -      72 mIU/mL  4 Weeks      10.2   -     708 mIU/mL  5 Weeks       217   -   8,245 mIU/mL  6 Weeks       152   -  32,177 mIU/mL  7 Weeks     4,059   - 153,767 mIU/mL  8 Weeks    31,366   - 149,094 mIU/mL  9 Weeks    59,109   - 135,901 mIU/mL  10 Weeks   44,186   - 170,409 mIU/mL  12 Weeks   27,107   - 201,615 mIU/mL  14 Weeks   24,302   -  93,646 mIU/mL  15 Weeks   12,540   -  69,747 mIU/mL  16 Weeks    8,904   -  55,332 mIU/mL  17 Weeks    8,240   -  51,793 mIU/mL  18 Weeks    9,649   -  55,271 mIU/mL      CBC Auto Differential [[id number]]  (Abnormal) Collected: 03/29/24 1850    Specimen: Blood Updated: 03/29/24 1859     WBC 8.29 10*3/mm3      RBC 4.15 10*6/mm3      Hemoglobin 11.9 g/dL      Hematocrit 37.0 %      MCV 89.2 fL      MCH 28.7 pg      MCHC 32.2 g/dL      RDW 14.1 %      RDW-SD 45.7 fl      MPV 9.1 fL      Platelets 319 10*3/mm3      Neutrophil % 71.6 %      Lymphocyte % 23.4 %      Monocyte % 3.9 %      Eosinophil % 0.7 %      Basophil % 0.2 %      Immature Grans % 0.2 %      Neutrophils, Absolute 5.93 10*3/mm3      Lymphocytes, Absolute 1.94 10*3/mm3      Monocytes, Absolute 0.32 10*3/mm3      Eosinophils, Absolute 0.06 10*3/mm3      Basophils, Absolute 0.02 10*3/mm3      Immature Grans, Absolute 0.02 10*3/mm3      nRBC 0.0 /100 WBC              Imaging:    CT Abdomen Pelvis With Contrast    Result Date: 3/29/2024  CT ABDOMEN PELVIS W CONTRAST-  Date of Exam: 3/29/2024 8:01 PM  Indication: right ab tenderness.  Comparison: None available.  Technique: Axial CT images were obtained of the abdomen and pelvis following the uneventful intravenous administration of Isovue . Reconstructed coronal and sagittal images were also obtained. Automated exposure control and iterative construction methods were used.   Findings: There is hepatic  steatosis. Gallbladder gross unremarkable. There is no abnormality of the spleen pancreas adrenal glands or kidneys. The bladder does not appear unusual for the degree of distention.  Assessment of the bowel is somewhat limited by the lack of contrast. There is some free fluid in the pelvis which could be physiologic. Appendix not well defined.   The osseous structures do not appear unusual.      Impression: 1.  Some suggested hepatic steatosis. 2.  Free fluid in the pelvis which could be physiologic.    Electronically Signed By-Eliecer Foreman MD On:3/29/2024 8:24 PM         Differential Diagnosis and Discussion:    Abdominal Pain: Based on the patient's signs and symptoms, I considered abdominal aortic aneurysm, small bowel obstruction, pancreatitis, acute cholecystitis, acute appendecitis, peptic ulcer disease, gastritis, colitis, endocrine disorders, irritable bowel syndrome and other differential diagnosis an etiology of the patient's abdominal pain.    All labs were reviewed and interpreted by me.  CT scan radiology impression was interpreted by me.    MDM               Patient Care Considerations:    SEPSIS was considered but is NOT present in the emergency department as SIRS criteria is not present.      Consultants/Shared Management Plan:    None    Social Determinants of Health:    Patient is independent, reliable, and has access to care.       Disposition and Care Coordination:    Discharged: The patient is suitable and stable for discharge with no need for consideration of admission.    I have explained the patient´s condition, diagnoses and treatment plan based on the information available to me at this time. I have answered questions and addressed any concerns. The patient has a good  understanding of the patient´s diagnosis, condition, and treatment plan as can be expected at this point. The vital signs have been stable. The patient´s condition is stable and appropriate for discharge from the emergency  department.      The patient will pursue further outpatient evaluation with the primary care physician or other designated or consulting physician as outlined in the discharge instructions. They are agreeable to this plan of care and follow-up instructions have been explained in detail. The patient has received these instructions in written format and has expressed an understanding of the discharge instructions. The patient is aware that any significant change in condition or worsening of symptoms should prompt an immediate return to this or the closest emergency department or call to 911.    Final diagnoses:   Periumbilical abdominal pain   Right upper quadrant abdominal pain   Diarrhea, unspecified type        ED Disposition       ED Disposition   Discharge    Condition   Stable    Comment   --               This medical record created using voice recognition software.       Darcie Madison, APRN  03/29/24 [id number]

## 2024-03-30 NOTE — DISCHARGE INSTRUCTIONS
Please know that all your lab work completed tonight was within normal minutes.  Also your CAT scan did not show any emergent findings.  It did show that you had some inflammation of the liver however nothing alarming.  Please follow-up with your primary care provider in 3 to 5 days if you continue to have pain.  You may need additional imaging that they can do that we cannot do here in the emergency department  If however you develop a fever that you cannot control with Tylenol or Motrin, severe nausea, vomiting, diarrhea, or any bloody diarrhea please return to the ER otherwise follow-up with your primary care provider in 3 to 5 days

## 2024-05-26 ENCOUNTER — APPOINTMENT (OUTPATIENT)
Dept: CT IMAGING | Facility: HOSPITAL | Age: 39
End: 2024-05-26
Payer: MEDICAID

## 2024-05-26 ENCOUNTER — HOSPITAL ENCOUNTER (EMERGENCY)
Facility: HOSPITAL | Age: 39
Discharge: LEFT BEFORE DISCHARGE | End: 2024-05-26
Attending: EMERGENCY MEDICINE | Admitting: EMERGENCY MEDICINE
Payer: MEDICAID

## 2024-05-26 VITALS
TEMPERATURE: 98.7 F | RESPIRATION RATE: 22 BRPM | OXYGEN SATURATION: 99 % | SYSTOLIC BLOOD PRESSURE: 108 MMHG | HEART RATE: 66 BPM | DIASTOLIC BLOOD PRESSURE: 78 MMHG | WEIGHT: 121.25 LBS | HEIGHT: 68 IN | BODY MASS INDEX: 18.38 KG/M2

## 2024-05-26 DIAGNOSIS — R10.9 FLANK PAIN: Primary | ICD-10-CM

## 2024-05-26 DIAGNOSIS — J06.9 VIRAL URI: ICD-10-CM

## 2024-05-26 DIAGNOSIS — K76.0 HEPATIC STEATOSIS: ICD-10-CM

## 2024-05-26 LAB
ALBUMIN SERPL-MCNC: 4.3 G/DL (ref 3.5–5.2)
ALBUMIN/GLOB SERPL: 1.5 G/DL
ALP SERPL-CCNC: 137 U/L (ref 39–117)
ALT SERPL W P-5'-P-CCNC: 26 U/L (ref 1–33)
ANION GAP SERPL CALCULATED.3IONS-SCNC: 11.7 MMOL/L (ref 5–15)
AST SERPL-CCNC: 22 U/L (ref 1–32)
BACTERIA UR QL AUTO: ABNORMAL /HPF
BASOPHILS # BLD AUTO: 0.02 10*3/MM3 (ref 0–0.2)
BASOPHILS NFR BLD AUTO: 0.3 % (ref 0–1.5)
BILIRUB SERPL-MCNC: 0.7 MG/DL (ref 0–1.2)
BILIRUB UR QL STRIP: NEGATIVE
BUN SERPL-MCNC: 9 MG/DL (ref 6–20)
BUN/CREAT SERPL: 9.4 (ref 7–25)
CALCIUM SPEC-SCNC: 9.8 MG/DL (ref 8.6–10.5)
CHLORIDE SERPL-SCNC: 104 MMOL/L (ref 98–107)
CLARITY UR: ABNORMAL
CO2 SERPL-SCNC: 23.3 MMOL/L (ref 22–29)
COLOR UR: YELLOW
CREAT SERPL-MCNC: 0.96 MG/DL (ref 0.57–1)
DEPRECATED RDW RBC AUTO: 51.1 FL (ref 37–54)
EGFRCR SERPLBLD CKD-EPI 2021: 78.3 ML/MIN/1.73
EOSINOPHIL # BLD AUTO: 0.05 10*3/MM3 (ref 0–0.4)
EOSINOPHIL NFR BLD AUTO: 0.9 % (ref 0.3–6.2)
ERYTHROCYTE [DISTWIDTH] IN BLOOD BY AUTOMATED COUNT: 15.9 % (ref 12.3–15.4)
FLUAV SUBTYP SPEC NAA+PROBE: NOT DETECTED
FLUBV RNA ISLT QL NAA+PROBE: NOT DETECTED
GLOBULIN UR ELPH-MCNC: 2.8 GM/DL
GLUCOSE BLDC GLUCOMTR-MCNC: 107 MG/DL (ref 70–99)
GLUCOSE SERPL-MCNC: 79 MG/DL (ref 65–99)
GLUCOSE UR STRIP-MCNC: NEGATIVE MG/DL
HCG INTACT+B SERPL-ACNC: <0.5 MIU/ML
HCT VFR BLD AUTO: 39.5 % (ref 34–46.6)
HGB BLD-MCNC: 12.7 G/DL (ref 12–15.9)
HGB UR QL STRIP.AUTO: ABNORMAL
HOLD SPECIMEN: NORMAL
HOLD SPECIMEN: NORMAL
HYALINE CASTS UR QL AUTO: ABNORMAL /LPF
IMM GRANULOCYTES # BLD AUTO: 0.01 10*3/MM3 (ref 0–0.05)
IMM GRANULOCYTES NFR BLD AUTO: 0.2 % (ref 0–0.5)
KETONES UR QL STRIP: NEGATIVE
LEUKOCYTE ESTERASE UR QL STRIP.AUTO: ABNORMAL
LIPASE SERPL-CCNC: 20 U/L (ref 13–60)
LYMPHOCYTES # BLD AUTO: 1.47 10*3/MM3 (ref 0.7–3.1)
LYMPHOCYTES NFR BLD AUTO: 25.2 % (ref 19.6–45.3)
MCH RBC QN AUTO: 28.5 PG (ref 26.6–33)
MCHC RBC AUTO-ENTMCNC: 32.2 G/DL (ref 31.5–35.7)
MCV RBC AUTO: 88.6 FL (ref 79–97)
MONOCYTES # BLD AUTO: 0.33 10*3/MM3 (ref 0.1–0.9)
MONOCYTES NFR BLD AUTO: 5.7 % (ref 5–12)
NEUTROPHILS NFR BLD AUTO: 3.96 10*3/MM3 (ref 1.7–7)
NEUTROPHILS NFR BLD AUTO: 67.7 % (ref 42.7–76)
NITRITE UR QL STRIP: NEGATIVE
NRBC BLD AUTO-RTO: 0 /100 WBC (ref 0–0.2)
PH UR STRIP.AUTO: 6 [PH] (ref 5–8)
PLATELET # BLD AUTO: 258 10*3/MM3 (ref 140–450)
PMV BLD AUTO: 8.8 FL (ref 6–12)
POTASSIUM SERPL-SCNC: 3.7 MMOL/L (ref 3.5–5.2)
PROT SERPL-MCNC: 7.1 G/DL (ref 6–8.5)
PROT UR QL STRIP: ABNORMAL
RBC # BLD AUTO: 4.46 10*6/MM3 (ref 3.77–5.28)
RBC # UR STRIP: ABNORMAL /HPF
REF LAB TEST METHOD: ABNORMAL
RSV RNA NPH QL NAA+NON-PROBE: NOT DETECTED
S PYO AG THROAT QL: NEGATIVE
SARS-COV-2 RNA RESP QL NAA+PROBE: NOT DETECTED
SODIUM SERPL-SCNC: 139 MMOL/L (ref 136–145)
SP GR UR STRIP: 1.02 (ref 1–1.03)
SQUAMOUS #/AREA URNS HPF: ABNORMAL /HPF
UROBILINOGEN UR QL STRIP: ABNORMAL
WBC # UR STRIP: ABNORMAL /HPF
WBC NRBC COR # BLD AUTO: 5.84 10*3/MM3 (ref 3.4–10.8)
WHOLE BLOOD HOLD COAG: NORMAL
WHOLE BLOOD HOLD SPECIMEN: NORMAL

## 2024-05-26 PROCEDURE — 96374 THER/PROPH/DIAG INJ IV PUSH: CPT

## 2024-05-26 PROCEDURE — 81001 URINALYSIS AUTO W/SCOPE: CPT | Performed by: EMERGENCY MEDICINE

## 2024-05-26 PROCEDURE — 25510000001 IOPAMIDOL PER 1 ML: Performed by: EMERGENCY MEDICINE

## 2024-05-26 PROCEDURE — 25010000002 KETOROLAC TROMETHAMINE PER 15 MG

## 2024-05-26 PROCEDURE — 87637 SARSCOV2&INF A&B&RSV AMP PRB: CPT | Performed by: EMERGENCY MEDICINE

## 2024-05-26 PROCEDURE — 85025 COMPLETE CBC W/AUTO DIFF WBC: CPT | Performed by: EMERGENCY MEDICINE

## 2024-05-26 PROCEDURE — 87880 STREP A ASSAY W/OPTIC: CPT | Performed by: EMERGENCY MEDICINE

## 2024-05-26 PROCEDURE — 83690 ASSAY OF LIPASE: CPT | Performed by: EMERGENCY MEDICINE

## 2024-05-26 PROCEDURE — 99285 EMERGENCY DEPT VISIT HI MDM: CPT

## 2024-05-26 PROCEDURE — 84702 CHORIONIC GONADOTROPIN TEST: CPT | Performed by: EMERGENCY MEDICINE

## 2024-05-26 PROCEDURE — 87081 CULTURE SCREEN ONLY: CPT | Performed by: EMERGENCY MEDICINE

## 2024-05-26 PROCEDURE — 82948 REAGENT STRIP/BLOOD GLUCOSE: CPT

## 2024-05-26 PROCEDURE — 74177 CT ABD & PELVIS W/CONTRAST: CPT

## 2024-05-26 PROCEDURE — 80053 COMPREHEN METABOLIC PANEL: CPT | Performed by: EMERGENCY MEDICINE

## 2024-05-26 RX ORDER — FLUTICASONE PROPIONATE 50 MCG
2 SPRAY, SUSPENSION (ML) NASAL DAILY
Qty: 11.1 ML | Refills: 0 | Status: SHIPPED | OUTPATIENT
Start: 2024-05-26

## 2024-05-26 RX ORDER — KETOROLAC TROMETHAMINE 30 MG/ML
30 INJECTION, SOLUTION INTRAMUSCULAR; INTRAVENOUS ONCE
Status: COMPLETED | OUTPATIENT
Start: 2024-05-26 | End: 2024-05-26

## 2024-05-26 RX ORDER — SODIUM CHLORIDE 0.9 % (FLUSH) 0.9 %
10 SYRINGE (ML) INJECTION AS NEEDED
Status: DISCONTINUED | OUTPATIENT
Start: 2024-05-26 | End: 2024-05-26 | Stop reason: HOSPADM

## 2024-05-26 RX ADMIN — KETOROLAC TROMETHAMINE 30 MG: 30 INJECTION, SOLUTION INTRAMUSCULAR; INTRAVENOUS at 13:05

## 2024-05-26 RX ADMIN — IOPAMIDOL 100 ML: 755 INJECTION, SOLUTION INTRAVENOUS at 13:51

## 2024-05-26 NOTE — ED PROVIDER NOTES
Time: 11:37 AM EDT  Date of encounter:  5/26/2024  Independent Historian/Clinical History and Information was obtained by:   Patient    History is limited by: N/A    Chief Complaint: Flank pain      History of Present Illness:  Patient is a 39 y.o. year old female who presents to the emergency department for evaluation of right flank pain for the past 3 days.  Patient also reports nasal congestion, headache, sore throat.  Patient denies fevers or chills, denies urinary symptoms, denies nausea or vomiting, denies diarrhea or constipation.  Patient reports she came to the ED 2 months ago with similar flank pain, which ended up resolving on its own.      HPI    Patient Care Team  Primary Care Provider: Kelly Little APRN    Past Medical History:     Allergies   Allergen Reactions    Sulfa Antibiotics Anaphylaxis     Past Medical History:   Diagnosis Date    Anemia 2003    1st pregnancy    Bipolar disorder 2000    Depression     Hepatitis C     finished tx 2021    PONV (postoperative nausea and vomiting) 2004    Substance abuse 2007    Varicella      Past Surgical History:   Procedure Laterality Date    KNEE SURGERY Left     2 surgeries    WISDOM TOOTH EXTRACTION  2007     Family History   Problem Relation Age of Onset    Breast cancer Paternal Aunt     Breast cancer Paternal Aunt        Home Medications:  Prior to Admission medications    Medication Sig Start Date End Date Taking? Authorizing Provider   dicyclomine (BENTYL) 20 MG tablet Take 1 tablet by mouth Every 8 (Eight) Hours As Needed for Abdominal Cramping. 3/29/24   Darcie Madison APRN   ferrous sulfate 325 (65 FE) MG tablet Take 1 tablet by mouth Daily With Breakfast.    Provider, Historical, MD        Social History:   Social History     Tobacco Use    Smoking status: Heavy Smoker     Current packs/day: 1.00     Average packs/day: 1 pack/day for 15.0 years (15.0 ttl pk-yrs)     Types: Cigarettes     Passive exposure: Never    Smokeless tobacco:  "Never    Tobacco comments:     Workin on quittting   Vaping Use    Vaping status: Former    Quit date: 6/13/2022    Substances: Nicotine   Substance Use Topics    Alcohol use: No    Drug use: Not Currently     Types: Fentanyl, Heroin, Marijuana     Comment: Quit in 2020         Review of Systems:  Review of Systems   Constitutional:  Negative for fever.   HENT:  Positive for congestion and sore throat.    Eyes: Negative.    Respiratory:  Negative for cough and shortness of breath.    Cardiovascular:  Negative for chest pain.   Gastrointestinal:  Positive for abdominal pain. Negative for constipation, diarrhea, nausea and vomiting.   Genitourinary:  Positive for flank pain. Negative for dysuria.   Musculoskeletal:  Negative for neck pain.   Skin:  Negative for rash.   Allergic/Immunologic: Negative.    Neurological:  Positive for headaches. Negative for weakness and numbness.   Hematological: Negative.    Psychiatric/Behavioral: Negative.     All other systems reviewed and are negative.       Physical Exam:  /78 (BP Location: Left arm, Patient Position: Lying)   Pulse 66   Temp 98.7 °F (37.1 °C) (Oral)   Resp 22   Ht 172.7 cm (68\")   Wt 55 kg (121 lb 4.1 oz)   SpO2 99%   BMI 18.44 kg/m²     Physical Exam  Vitals and nursing note reviewed.   Constitutional:       General: She is not in acute distress.     Appearance: Normal appearance. She is not toxic-appearing.   HENT:      Head: Normocephalic and atraumatic.      Jaw: There is normal jaw occlusion.   Eyes:      General: Lids are normal.      Extraocular Movements: Extraocular movements intact.      Conjunctiva/sclera: Conjunctivae normal.      Pupils: Pupils are equal, round, and reactive to light.   Cardiovascular:      Rate and Rhythm: Normal rate and regular rhythm.      Pulses: Normal pulses.      Heart sounds: Normal heart sounds.   Pulmonary:      Effort: Pulmonary effort is normal. No respiratory distress.      Breath sounds: Normal breath " sounds. No wheezing or rhonchi.   Abdominal:      General: Abdomen is flat.      Palpations: Abdomen is soft.      Tenderness: There is abdominal tenderness in the right upper quadrant. There is right CVA tenderness. There is no guarding or rebound.       Musculoskeletal:         General: Normal range of motion.      Cervical back: Normal range of motion and neck supple.      Right lower leg: No edema.      Left lower leg: No edema.   Skin:     General: Skin is warm and dry.   Neurological:      Mental Status: She is alert and oriented to person, place, and time. Mental status is at baseline.   Psychiatric:         Mood and Affect: Mood normal.            Procedures:  Procedures      Medical Decision Making:      Comorbidities that affect care:    Bipolar disorder, hepatitis C, Smoking, Substance Abuse    External Notes reviewed:    Previous Clinic Note: OB/GYN office visit from 12/6/2023, Previous ED Note: Previous ED visit from 3/29/2024, and Previous Radiological Studies: Reviewed CT of the abdomen pelvis with contrast from ED visit on 3/29/2024 which showed some suggested hepatic steatosis and free fluid in the pelvis which could be physiologic.      The following orders were placed and all results were independently analyzed by me:  Orders Placed This Encounter   Procedures    Rapid Strep A Screen - Swab, Throat    COVID-19, FLU A/B, RSV PCR 1 HR TAT - Swab, Nasopharynx    Beta Strep Culture, Throat - Swab, Throat    CT Abdomen Pelvis With Contrast    [place] Draw    Comprehensive Metabolic Panel    Lipase    Urinalysis With Microscopic If Indicated (No Culture) - Urine, Clean Catch    hCG, Quantitative, Pregnancy    CBC Auto Differential    Urinalysis, Microscopic Only - Urine, Clean Catch    NPO Diet NPO Type: Strict NPO    Undress & Gown    POC Glucose Once    Insert Peripheral IV    CBC & Differential    Green Top (Gel)    Lavender Top    Gold Top - SST    Light Blue Top       Medications Given in the  Emergency Department:  Medications   sodium chloride 0.9 % flush 10 mL (has no administration in time range)   ketorolac (TORADOL) injection 30 mg (30 mg Intravenous Given 5/26/24 1305)   iopamidol (ISOVUE-370) 76 % injection 100 mL (100 mL Intravenous Given 5/26/24 1351)        ED Course:         Labs:    Lab Results (last 24 hours)       Procedure Component Value Units Date/Time    Urinalysis With Microscopic If Indicated (No Culture) - Urine, Clean Catch [[id number]]  (Abnormal) Collected: 05/26/24 1200    Specimen: Urine, Clean Catch Updated: 05/26/24 1210     Color, UA Yellow     Appearance, UA Cloudy     pH, UA 6.0     Specific Gravity, UA 1.021     Glucose, UA Negative     Ketones, UA Negative     Bilirubin, UA Negative     Blood, UA Large (3+)     Protein, UA 30 mg/dL (1+)     Leuk Esterase, UA Small (1+)     Nitrite, UA Negative     Urobilinogen, UA 1.0 E.U./dL    Urinalysis, Microscopic Only - Urine, Clean Catch [[id number]]  (Abnormal) Collected: 05/26/24 1200    Specimen: Urine, Clean Catch Updated: 05/26/24 1237     RBC, UA 3-5 /HPF      WBC, UA 6-10 /HPF      Bacteria, UA Trace /HPF      Squamous Epithelial Cells, UA 13-20 /HPF      Hyaline Casts, UA None Seen /LPF      Methodology Manual Light Microscopy    CBC & Differential [[id number]]  (Abnormal) Collected: 05/26/24 1226    Specimen: Blood Updated: 05/26/24 1252    Narrative:      The following orders were created for panel order CBC & Differential.  Procedure                               Abnormality         Status                     ---------                               -----------         ------                     CBC Auto Differential[[id number]]        Abnormal            Final result                 Please view results for these tests on the individual orders.    Comprehensive Metabolic Panel [[id number]]  (Abnormal) Collected: 05/26/24 1226    Specimen: Blood Updated: 05/26/24 1320     Glucose 79 mg/dL      BUN 9 mg/dL      Creatinine 0.96  mg/dL      Sodium 139 mmol/L      Potassium 3.7 mmol/L      Chloride 104 mmol/L      CO2 23.3 mmol/L      Calcium 9.8 mg/dL      Total Protein 7.1 g/dL      Albumin 4.3 g/dL      ALT (SGPT) 26 U/L      AST (SGOT) 22 U/L      Alkaline Phosphatase 137 U/L      Total Bilirubin 0.7 mg/dL      Globulin 2.8 gm/dL      A/G Ratio 1.5 g/dL      BUN/Creatinine Ratio 9.4     Anion Gap 11.7 mmol/L      eGFR 78.3 mL/min/1.73     Narrative:      GFR Normal >60  Chronic Kidney Disease <60  Kidney Failure <15      Lipase [[id number]]  (Normal) Collected: 05/26/24 1226    Specimen: Blood Updated: 05/26/24 1320     Lipase 20 U/L     hCG, Quantitative, Pregnancy [[id number]] Collected: 05/26/24 1226    Specimen: Blood Updated: 05/26/24 1316     HCG Quantitative <0.50 mIU/mL     Narrative:      HCG Ranges by Gestational Age    Females - non-pregnant premenopausal   </= 1mIU/mL HCG  Females - postmenopausal               </= 7mIU/mL HCG    3 Weeks       5.4   -      72 mIU/mL  4 Weeks      10.2   -     708 mIU/mL  5 Weeks       217   -   8,245 mIU/mL  6 Weeks       152   -  32,177 mIU/mL  7 Weeks     4,059   - 153,767 mIU/mL  8 Weeks    31,366   - 149,094 mIU/mL  9 Weeks    59,109   - 135,901 mIU/mL  10 Weeks   44,186   - 170,409 mIU/mL  12 Weeks   27,107   - 201,615 mIU/mL  14 Weeks   24,302   -  93,646 mIU/mL  15 Weeks   12,540   -  69,747 mIU/mL  16 Weeks    8,904   -  55,332 mIU/mL  17 Weeks    8,240   -  51,793 mIU/mL  18 Weeks    9,649   -  55,271 mIU/mL      CBC Auto Differential [[id number]]  (Abnormal) Collected: 05/26/24 1226    Specimen: Blood Updated: 05/26/24 1252     WBC 5.84 10*3/mm3      RBC 4.46 10*6/mm3      Hemoglobin 12.7 g/dL      Hematocrit 39.5 %      MCV 88.6 fL      MCH 28.5 pg      MCHC 32.2 g/dL      RDW 15.9 %      RDW-SD 51.1 fl      MPV 8.8 fL      Platelets 258 10*3/mm3      Neutrophil % 67.7 %      Lymphocyte % 25.2 %      Monocyte % 5.7 %      Eosinophil % 0.9 %      Basophil % 0.3 %      Immature Grans %  0.2 %      Neutrophils, Absolute 3.96 10*3/mm3      Lymphocytes, Absolute 1.47 10*3/mm3      Monocytes, Absolute 0.33 10*3/mm3      Eosinophils, Absolute 0.05 10*3/mm3      Basophils, Absolute 0.02 10*3/mm3      Immature Grans, Absolute 0.01 10*3/mm3      nRBC 0.0 /100 WBC     Rapid Strep A Screen - Swab, Throat [[id number]]  (Normal) Collected: 05/26/24 1235    Specimen: Swab from Throat Updated: 05/26/24 1309     Strep A Ag Negative    COVID-19, FLU A/B, RSV PCR 1 HR TAT - Swab, Nasopharynx [[id number]]  (Normal) Collected: 05/26/24 1235    Specimen: Swab from Nasopharynx Updated: 05/26/24 1333     COVID19 Not Detected     Influenza A PCR Not Detected     Influenza B PCR Not Detected     RSV, PCR Not Detected    Narrative:      Fact sheet for providers: https://www.fda.gov/media/598009/download    Fact sheet for patients: https://www.fda.gov/media/372258/download    Test performed by PCR.    Beta Strep Culture, Throat - Swab, Throat [[id number]] Collected: 05/26/24 1235    Specimen: Swab from Throat Updated: 05/26/24 1309    POC Glucose Once [[id number]]  (Abnormal) Collected: 05/26/24 1421    Specimen: Blood Updated: 05/26/24 1422     Glucose 107 mg/dL      Comment: Serial Number: [id number]:  [id number]                Imaging:    CT Abdomen Pelvis With Contrast    Result Date: 5/26/2024  CT ABDOMEN PELVIS W CONTRAST-  Date of Exam: 5/26/2024 1:30 PM  Indication: RUQ abd pain, right flank pain.  Comparison: 3/29/2024  Technique: Contiguous axial CT images were obtained from the lung bases to the pubic symphysis following the uneventful administration of 100 mL Isovue-370 intravenous and oral contrast. Sagittal and coronal reconstructions were performed.  Automated exposure control and iterative reconstruction methods were used.   FINDINGS:  Lower Chest: Limited imaging of the lung bases grossly clear  No free air noted below the diaphragm  Organs: Liver is diffusely decreased in attenuation compatible with  hepatic steatosis. The gallbladder, kidneys, spleen, pancreas and adrenal glands are unremarkable  GI/Bowel: Motion limited image of the GI tract demonstrates grossly unremarkable appearance of the stomach. The small bowel demonstrates no evidence of obstruction: Demonstrates no acute abnormality. The appendix is not well visualized but no inflammatory changes noted at the base of the cecum. No suspicious mesenteric adenopathy Pelvis: Urinary bladder is grossly unremarkable. The uterus and ovaries are grossly unremarkable. Trace amount of fluid within the pelvis nonspecific in a young female  Peritoneum/Retroperitoneum: Aorta is normal in caliber. There is no suspicious retroperitoneal adenopathy  Bones/Soft Tissues: No acute osseous abnormality noted       1. Hepatic steatosis  2. No acute intra-abdominal or intrapelvic abnormality otherwise noted on mildly limited imaging.      Electronically Signed By-Tha Anaya On:5/26/2024 2:46 PM         Differential Diagnosis and Discussion:    Abdominal Pain: Based on the patient's signs and symptoms, I considered abdominal aortic aneurysm, small bowel obstruction, pancreatitis, acute cholecystitis, acute appendecitis, peptic ulcer disease, gastritis, colitis, endocrine disorders, irritable bowel syndrome and other differential diagnosis an etiology of the patient's abdominal pain.  Flank Pain: Differential diagnosis includes but is not limited to kidney stones, pyelonephritis, musculoskeletal disorders, renal infarction, urinary tract infection, hydronephrosis, radiculopathy, aortic aneurysm, renal cell carcinoma.  Viral syndrome: Differential diagnosis includes but is not limited to influenza, common cold, COVID-19, RSV, adenovirus, enteroviruses, herpes virus, hepatitis virus, measles, mumps, rubella, dengue fever, and possible bacterial infection.    All labs were reviewed and interpreted by me.  CT scan radiology impression was interpreted by me.    MDM     Amount  and/or Complexity of Data Reviewed  Clinical lab tests: reviewed       The patient is resting comfortably and feels better, is alert and in no distress. Repeat examination is unremarkable and benign; in particular, there's no discomfort at McBurney's point and there is no pulsatile mass. The history, exam, diagnostic testing, and current condition does not suggest acute appendicitis, bowel obstruction, acute cholecystitis, bowel perforation, major gastrointestinal bleeding, severe diverticulitis, abdominal aortic aneurysm, mesenteric ischemia, volvulus, sepsis, or other significant pathology that warrants further testing, continued ED treatment, admission, for surgical evaluation at this point. The vital signs have been stable. The patient does not have uncontrollable pain, intractable vomiting, or other significant symptoms. The patient's condition is stable and appropriate for discharge from the emergency department.  Viral swab was negative for COVID, flu, RSV.  Rapid strep swab was also negative.  Patient likely has viral upper respiratory infection.    Went to discuss discharge with patient she was not in the room.  Attempts to locate patient were unsuccessful.  Appears patient has left the emergency department.          Patient Care Considerations:    ANTIBIOTICS: I considered prescribing antibiotics as an outpatient however no bacterial focus of infection was found.      Consultants/Shared Management Plan:    None    Social Determinants of Health:    Patient is independent, reliable, and has access to care.       Disposition and Care Coordination:    Eloped: This patient has left the emergency department or waiting room with no communication to myself, nursing or administrative staff. There was no opportunity to discuss the patient's decision to leave, provide medical advice or discuss alternatives to. The staff has made efforts to locate patient without success.    I have explained the patient´s condition,  diagnoses and treatment plan based on the information available to me at this time. I have answered questions and addressed any concerns. The patient has a good  understanding of the patient´s diagnosis, condition, and treatment plan as can be expected at this point. The vital signs have been stable. The patient´s condition is stable and appropriate for discharge from the emergency department.      The patient will pursue further outpatient evaluation with the primary care physician or other designated or consulting physician as outlined in the discharge instructions. They are agreeable to this plan of care and follow-up instructions have been explained in detail. The patient has received these instructions in written format and has expressed an understanding of the discharge instructions. The patient is aware that any significant change in condition or worsening of symptoms should prompt an immediate return to this or the closest emergency department or call to 911.  I have explained discharge medications and the need for follow up with the patient/caretakers. This was also printed in the discharge instructions. Patient was discharged with the following medications and follow up:      Medication List        New Prescriptions      fluticasone 50 MCG/ACT nasal spray  Commonly known as: FLONASE  2 sprays into the nostril(s) as directed by provider Daily.               Where to Get Your Medications        These medications were sent to [facility]/pharmacy #[id number] - [place], [place] - [address] - [phone]  - [phone]  [address] [place] [address]      Hours: 24-hours Phone: [phone]   fluticasone 50 MCG/ACT nasal spray      Kelly Little, DAMIAN  [address]  [place] [address]  [phone]    Call in 2 days         Final diagnoses:   Flank pain   Viral URI   Hepatic steatosis        ED Disposition       ED Disposition   Eloped    Condition   --    Comment   --                This medical record created using voice recognition software.             Astrid Walters, DAMIAN  05/26/24 1500

## 2024-05-26 NOTE — ED TRIAGE NOTES
Right flank pain and head congestion, sore throat- had same pain in march and went away came back 3 days ago. had CT scan done in March showed enlarged liver and fatty gallbladder

## 2024-05-28 LAB — BACTERIA SPEC AEROBE CULT: NORMAL

## 2024-06-11 ENCOUNTER — OFFICE VISIT (OUTPATIENT)
Dept: FAMILY MEDICINE CLINIC | Facility: CLINIC | Age: 39
End: 2024-06-11
Payer: MEDICAID

## 2024-06-11 VITALS
BODY MASS INDEX: 18.04 KG/M2 | OXYGEN SATURATION: 99 % | HEIGHT: 68 IN | HEART RATE: 65 BPM | DIASTOLIC BLOOD PRESSURE: 58 MMHG | WEIGHT: 119 LBS | SYSTOLIC BLOOD PRESSURE: 98 MMHG

## 2024-06-11 DIAGNOSIS — K76.0 FATTY LIVER: ICD-10-CM

## 2024-06-11 DIAGNOSIS — F17.210 CIGARETTE NICOTINE DEPENDENCE WITHOUT COMPLICATION: ICD-10-CM

## 2024-06-11 DIAGNOSIS — Z00.00 ANNUAL PHYSICAL EXAM: Primary | ICD-10-CM

## 2024-06-11 PROCEDURE — 1159F MED LIST DOCD IN RCRD: CPT | Performed by: NURSE PRACTITIONER

## 2024-06-11 PROCEDURE — 1160F RVW MEDS BY RX/DR IN RCRD: CPT | Performed by: NURSE PRACTITIONER

## 2024-06-11 PROCEDURE — 99395 PREV VISIT EST AGE 18-39: CPT | Performed by: NURSE PRACTITIONER

## 2024-06-11 PROCEDURE — 2014F MENTAL STATUS ASSESS: CPT | Performed by: NURSE PRACTITIONER

## 2024-06-11 NOTE — PROGRESS NOTES
Chief Complaint  Annual Exam    Subjective            Aubree Krueger presents to [facility]  History of Present Illness  Pt is an annual CPE for anemia. Pt went to ED on 5/26 and was found to have elevated LFT. Pt states she did leave AMA. Pt was told she has a fatty liver. Pt has had CT on 3/29 and 5/26. Both show Hepatic steatosis. She does have on and off right sided abdominal pain.  She would like to see GI for work up.  She does reports she eats a high fat diet.    Pt is UTD on labs.    Pap: 412/23. Pt is followed by Dr. Vasiliy Mullins with GYN.    Pt is due PCV20 and Hep B vaccines. Pt declines and understands the risks of not having.     Pt is a current smoker, 1 ppd. Pt is due CXR/order placed.           Past Medical History:   Diagnosis Date    Anemia 2003    1st pregnancy    Bipolar disorder 2000    Depression     Hepatitis C     finished tx 2021    PONV (postoperative nausea and vomiting) 2004    Substance abuse 2007    Varicella        Allergies   Allergen Reactions    Sulfa Antibiotics Anaphylaxis        Past Surgical History:   Procedure Laterality Date    KNEE SURGERY Left     2 surgeries    WISDOM TOOTH EXTRACTION  2007        Social History     Tobacco Use    Smoking status: Heavy Smoker     Current packs/day: 1.00     Average packs/day: 1 pack/day for 15.0 years (15.0 ttl pk-yrs)     Types: Cigarettes     Passive exposure: Never    Smokeless tobacco: Never    Tobacco comments:     Workin on quittting   Substance Use Topics    Alcohol use: No       Family History   Problem Relation Age of Onset    Breast cancer Paternal Aunt     Breast cancer Paternal Aunt         Current Outpatient Medications on File Prior to Visit   Medication Sig    [DISCONTINUED] dicyclomine (BENTYL) 20 MG tablet Take 1 tablet by mouth Every 8 (Eight) Hours As Needed for Abdominal Cramping.    [DISCONTINUED] ferrous sulfate 325 (65 FE) MG tablet Take 1 tablet by mouth Daily With Breakfast.     "[DISCONTINUED] fluticasone (FLONASE) 50 MCG/ACT nasal spray 2 sprays into the nostril(s) as directed by provider Daily.     No current facility-administered medications on file prior to visit.       Health Maintenance Due   Topic Date Due    BMI FOLLOWUP  Never done    ANNUAL PHYSICAL  Never done       Objective     BP 98/58   Pulse 65   Ht 172.7 cm (68\")   Wt 54 kg (119 lb)   SpO2 99%   BMI 18.09 kg/m²       Physical Exam  Constitutional:       General: She is not in acute distress.     Appearance: Normal appearance. She is not ill-appearing.   HENT:      Head: Normocephalic and atraumatic.      Right Ear: Tympanic membrane, ear canal and external ear normal.      Left Ear: Tympanic membrane, ear canal and external ear normal.      Nose: Nose normal.   Cardiovascular:      Rate and Rhythm: Normal rate and regular rhythm.      Heart sounds: Normal heart sounds. No murmur heard.  Pulmonary:      Effort: Pulmonary effort is normal. No respiratory distress.      Breath sounds: Normal breath sounds.   Chest:      Chest wall: No tenderness.   Abdominal:      General: Abdomen is flat. Bowel sounds are normal. There is no distension.      Palpations: Abdomen is soft. There is no mass.      Tenderness: There is no abdominal tenderness. There is no guarding.   Musculoskeletal:         General: No swelling or tenderness. Normal range of motion.      Cervical back: Normal range of motion and neck supple.   Skin:     General: Skin is warm and dry.      Findings: No rash.   Neurological:      General: No focal deficit present.      Mental Status: She is alert and oriented to person, place, and time. Mental status is at baseline.      Gait: Gait normal.   Psychiatric:         Mood and Affect: Mood normal.         Behavior: Behavior normal.         Thought Content: Thought content normal.         Judgment: Judgment normal.         BMI is below normal parameters (malnutrition). Recommendations: none (medical contraindication) "   Result Review :                           Assessment and Plan        Diagnoses and all orders for this visit:    1. Annual physical exam (Primary)  -     XR Chest PA & Lateral; Future    2. Fatty liver  Comments:  encouraged low fat diet and daily cardio exercise  Orders:  -     Ambulatory Referral to Gastroenterology    3. Cigarette nicotine dependence without complication  -     XR Chest PA & Lateral; Future      Preventative Counseling:  Healthy diet  Daily exercise  Get adequate sleep          Follow Up     Return in about 1 year (around 6/11/2025), or if symptoms worsen or fail to improve, for Annual physical.    Patient was given instructions and counseling regarding her condition or for health maintenance advice. Please see specific information pulled into the AVS if appropriate.     Aubree Krueger  reports that she has been smoking cigarettes. She has a 15 pack-year smoking history. She has never been exposed to tobacco smoke. She has never used smokeless tobacco. I have educated her on the risk of diseases from using tobacco products such as cancer, COPD, and heart disease.     I advised her to quit and she is not willing to quit.    I spent 3  minutes counseling the patient.

## 2024-08-05 ENCOUNTER — TELEPHONE (OUTPATIENT)
Dept: GASTROENTEROLOGY | Facility: CLINIC | Age: 39
End: 2024-08-05

## 2024-08-05 NOTE — TELEPHONE ENCOUNTER
Attempted to contact/Contacted Aubree Krueger 1985 regarding the appointment no show with DAMIAN Poe on 08.05.24 @ 10:00. Patient is aware that there is a 24-hour cancellation policy and understands that a no-show letter will be mailed to them at the address on file. Patient isn't available at this time, call back later.

## 2024-12-26 ENCOUNTER — HOSPITAL ENCOUNTER (EMERGENCY)
Facility: HOSPITAL | Age: 39
Discharge: HOME | End: 2024-12-26
Attending: EMERGENCY MEDICINE
Payer: MEDICAID

## 2024-12-26 VITALS
BODY MASS INDEX: 19.38 KG/M2 | OXYGEN SATURATION: 98 % | HEIGHT: 68 IN | SYSTOLIC BLOOD PRESSURE: 103 MMHG | TEMPERATURE: 98.2 F | DIASTOLIC BLOOD PRESSURE: 72 MMHG | HEART RATE: 85 BPM | WEIGHT: 127.87 LBS | RESPIRATION RATE: 20 BRPM

## 2024-12-26 DIAGNOSIS — J10.1 INFLUENZA A: Primary | ICD-10-CM

## 2024-12-26 LAB
FLUAV SUBTYP SPEC NAA+PROBE: DETECTED
FLUBV RNA ISLT QL NAA+PROBE: NOT DETECTED
RSV RNA NPH QL NAA+NON-PROBE: NOT DETECTED
S PYO AG THROAT QL: NEGATIVE
SARS-COV-2 RNA RESP QL NAA+PROBE: NOT DETECTED

## 2024-12-26 PROCEDURE — 96372 THER/PROPH/DIAG INJ SC/IM: CPT

## 2024-12-26 PROCEDURE — 87880 STREP A ASSAY W/OPTIC: CPT | Performed by: EMERGENCY MEDICINE

## 2024-12-26 PROCEDURE — 25010000002 DEXAMETHASONE SODIUM PHOSPHATE 10 MG/ML SOLUTION

## 2024-12-26 PROCEDURE — 87637 SARSCOV2&INF A&B&RSV AMP PRB: CPT | Performed by: EMERGENCY MEDICINE

## 2024-12-26 PROCEDURE — 99283 EMERGENCY DEPT VISIT LOW MDM: CPT

## 2024-12-26 PROCEDURE — 25010000002 KETOROLAC TROMETHAMINE PER 15 MG

## 2024-12-26 PROCEDURE — 87081 CULTURE SCREEN ONLY: CPT | Performed by: EMERGENCY MEDICINE

## 2024-12-26 RX ORDER — KETOROLAC TROMETHAMINE 30 MG/ML
60 INJECTION, SOLUTION INTRAMUSCULAR; INTRAVENOUS ONCE
Status: COMPLETED | OUTPATIENT
Start: 2024-12-26 | End: 2024-12-26

## 2024-12-26 RX ORDER — FLUTICASONE PROPIONATE 50 MCG
2 SPRAY, SUSPENSION (ML) NASAL DAILY
Qty: 9.9 G | Refills: 0 | Status: SHIPPED | OUTPATIENT
Start: 2024-12-26

## 2024-12-26 RX ORDER — DEXAMETHASONE SODIUM PHOSPHATE 10 MG/ML
10 INJECTION, SOLUTION INTRAMUSCULAR; INTRAVENOUS ONCE
Status: COMPLETED | OUTPATIENT
Start: 2024-12-26 | End: 2024-12-26

## 2024-12-26 RX ORDER — BROMPHENIRAMINE MALEATE, PSEUDOEPHEDRINE HYDROCHLORIDE, AND DEXTROMETHORPHAN HYDROBROMIDE 2; 30; 10 MG/5ML; MG/5ML; MG/5ML
10 SYRUP ORAL 4 TIMES DAILY PRN
Qty: 240 ML | Refills: 0 | Status: SHIPPED | OUTPATIENT
Start: 2024-12-26

## 2024-12-26 RX ADMIN — DEXAMETHASONE SODIUM PHOSPHATE 10 MG: 10 INJECTION INTRAMUSCULAR; INTRAVENOUS at 10:12

## 2024-12-26 RX ADMIN — KETOROLAC TROMETHAMINE 60 MG: 30 INJECTION, SOLUTION INTRAMUSCULAR at 10:12

## 2024-12-26 NOTE — ED PROVIDER NOTES
Time: 9:21 AM EST  Date of encounter:  12/26/2024  Independent Historian/Clinical History and Information was obtained by:   Patient    History is limited by: N/A    Chief Complaint   Patient presents with    Sore Throat    Cough    Nasal Congestion         History of Present Illness:  Patient is a 39 y.o. year old female who presents to the emergency department for evaluation of sore throat.  Patient states she has had a cough for 2 to 3 days but started experiencing sore throat and ear pain in the last 24 hours.  Patient reports intermittent fevers.  Denies nausea or vomiting.  Denies diarrhea.    Patient Care Team  Primary Care Provider: Provider, No Known    Past Medical History:     Allergies   Allergen Reactions    Sulfa Antibiotics Anaphylaxis     Past Medical History:   Diagnosis Date    Anemia 2003    1st pregnancy    Bipolar disorder 2000    Depression     Hepatitis C     finished tx 2021    PONV (postoperative nausea and vomiting) 2004    Substance abuse 2007    Varicella      Past Surgical History:   Procedure Laterality Date    KNEE SURGERY Left     2 surgeries    WISDOM TOOTH EXTRACTION  2007     Family History   Problem Relation Age of Onset    Breast cancer Paternal Aunt     Breast cancer Paternal Aunt        Home Medications:  Prior to Admission medications    Not on File        Social History:   Social History     Tobacco Use    Smoking status: Heavy Smoker     Current packs/day: 1.00     Average packs/day: 1 pack/day for 15.0 years (15.0 ttl pk-yrs)     Types: Cigarettes     Passive exposure: Never    Smokeless tobacco: Never    Tobacco comments:     Workin on quittting   Vaping Use    Vaping status: Former    Quit date: 6/13/2022    Substances: Nicotine   Substance Use Topics    Alcohol use: No    Drug use: Not Currently     Types: Fentanyl, Heroin, Marijuana     Comment: Quit in 2020         Review of Systems:  Review of Systems   Constitutional: Negative.    HENT:  Positive for congestion, ear  "pain and sore throat.    Eyes: Negative.    Respiratory:  Positive for cough.    Cardiovascular: Negative.    Gastrointestinal: Negative.    Endocrine: Negative.    Genitourinary: Negative.    Musculoskeletal: Negative.    Skin: Negative.    Allergic/Immunologic: Negative.    Neurological: Negative.    Hematological: Negative.    Psychiatric/Behavioral: Negative.          Physical Exam:  /72 (BP Location: Right arm, Patient Position: Sitting)   Pulse 85   Temp 98.2 °F (36.8 °C) (Oral)   Resp 20   Ht 172.7 cm (68\")   Wt 58 kg (127 lb 13.9 oz)   LMP 12/26/2024 (Approximate) Comment: Now  SpO2 98%   BMI 19.44 kg/m²         Physical Exam  Vitals and nursing note reviewed.   Constitutional:       General: She is not in acute distress.     Appearance: Normal appearance. She is not toxic-appearing.   HENT:      Head: Normocephalic and atraumatic.      Jaw: There is normal jaw occlusion.      Right Ear: Tympanic membrane and ear canal normal.      Left Ear: Tympanic membrane and ear canal normal.      Nose: Nose normal. Congestion present.      Mouth/Throat:      Mouth: Mucous membranes are moist.      Pharynx: Posterior oropharyngeal erythema present.   Eyes:      General: Lids are normal.      Extraocular Movements: Extraocular movements intact.      Conjunctiva/sclera: Conjunctivae normal.      Pupils: Pupils are equal, round, and reactive to light.   Cardiovascular:      Rate and Rhythm: Normal rate and regular rhythm.      Pulses: Normal pulses.      Heart sounds: Normal heart sounds.   Pulmonary:      Effort: Pulmonary effort is normal. No respiratory distress.      Breath sounds: Normal breath sounds. No stridor. No wheezing, rhonchi or rales.   Abdominal:      General: Abdomen is flat. Bowel sounds are normal.      Palpations: Abdomen is soft.      Tenderness: There is no abdominal tenderness. There is no guarding or rebound.   Musculoskeletal:         General: Normal range of motion.      Cervical " back: Normal range of motion and neck supple.      Right lower leg: No edema.      Left lower leg: No edema.   Lymphadenopathy:      Cervical: Cervical adenopathy present.   Skin:     General: Skin is warm and dry.   Neurological:      Mental Status: She is alert and oriented to person, place, and time. Mental status is at baseline.   Psychiatric:         Mood and Affect: Mood normal.                      Medical Decision Making:      Comorbidities that affect care:    Depression, hep C, bipolar disorder, substance abuse, anemia    External Notes reviewed:    Previous Clinic Note: Patient was last seen by PCM for annual physical exam in June 2024.      The following orders were placed and all results were independently analyzed by me:  Orders Placed This Encounter   Procedures    Rapid Strep A Screen - Swab, Throat    COVID-19, FLU A/B, RSV PCR 1 HR TAT - Swab, Nasopharynx    Beta Strep Culture, Throat - Swab, Throat       Medications Given in the Emergency Department:  Medications   ketorolac (TORADOL) injection 60 mg (60 mg Intramuscular Given 12/26/24 1012)   dexAMETHasone sodium phosphate injection 10 mg (10 mg Intramuscular Given 12/26/24 1012)        ED Course:    The patient was initially evaluated in the triage area where orders were placed. The patient was later dispositioned by DAMIAN Gunderson.      The patient was advised to stay for completion of workup which includes but is not limited to communication of labs and radiological results, reassessment and plan. The patient was advised that leaving prior to disposition by a provider could result in critical findings that are not communicated to the patient.          Labs:    Lab Results (last 24 hours)       Procedure Component Value Units Date/Time    Rapid Strep A Screen - Swab, Throat [[id number]]  (Normal) Collected: 12/26/24 0930    Specimen: Swab from Throat Updated: 12/26/24 1012     Strep A Ag Negative    COVID-19, FLU A/B, RSV PCR 1 HR TAT -  Swab, Nasopharynx [[id number]]  (Abnormal) Collected: 12/26/24 0930    Specimen: Swab from Nasopharynx Updated: 12/26/24 1030     COVID19 Not Detected     Influenza A PCR Detected     Influenza B PCR Not Detected     RSV, PCR Not Detected    Narrative:      Fact sheet for providers: https://www.fda.gov/media/607487/download    Fact sheet for patients: https://www.fda.gov/media/570214/download    Test performed by PCR.    Beta Strep Culture, Throat - Swab, Throat [[id number]] Collected: 12/26/24 0930    Specimen: Swab from Throat Updated: 12/26/24 1011             Imaging:    No Radiology Exams Resulted Within Past 24 Hours      Differential Diagnosis and Discussion:      Cough: Differential diagnosis includes but is not limited to pneumonia, acute bronchitis, upper respiratory infection, ACE inhibitor use, allergic reaction, epiglottitis, seasonal allergies, chemical irritants, exercise-induced asthma, viral syndrome.    PROCEDURES:    Labs were collected in the emergency department and all labs were reviewed and interpreted by me.    No orders to display        Procedures    MDM  Number of Diagnoses or Management Options  Influenza A  Diagnosis management comments: The patient presents to the ED with a cough. The patient is resting comfortably and feels better, is alert and in no distress.  On re-examination the patient does not appear toxic and has no meningeal signs (including a negative Kernig and Brudzinski sign), and there's no intractable vomiting, respiratory distress and no apparent pain. Based on the history, exam, diagnostic testing and reassessment, the patient has no signs of meningitis, significant pneumonia, pyelonephritis, sepsis or other acute serious bacterial infections, or other significant pathology to warrant further testing, continued ED treatment, admission or specialist evaluation. The patient's vital signs have been stable. The patient's condition is stable and is appropriate for discharge.  The patient´s symptoms are consistent with a viral upper respiratory infection and antibiotics are not indicated. The patient was counseled to return to the ED for re-evaluation for worsening cough, shortness of breath, uncontrollable headache, uncontrollable fever, altered mental status, or any symptoms which cause them concern. The patient will pursue further outpatient evaluation with the primary care physician or other designated or consultant physician as indicated in the discharge instructions.  Viral swab positive for influenza A.  Patient was provided prescriptions for her symptoms.  Patient verbalized understanding is agreeable plan for discharge.               Amount and/or Complexity of Data Reviewed  Clinical lab tests: reviewed and ordered  Tests in the medicine section of CPT®: ordered and reviewed  Review and summarize past medical records: yes  Independent visualization of images, tracings, or specimens: yes           Patient Care Considerations:    SEPSIS was considered but is NOT present in the emergency department as SIRS criteria is not present. ANTIBIOTICS: I considered prescribing antibiotics as an outpatient however no bacterial focus of infection was found.      Consultants/Shared Management Plan:    None    Social Determinants of Health:    Patient is independent, reliable, and has access to care.       Disposition and Care Coordination:    Discharged: The patient is suitable and stable for discharge with no need for consideration of admission.    I have explained the patient´s condition, diagnoses and treatment plan based on the information available to me at this time. I have answered questions and addressed any concerns. The patient has a good  understanding of the patient´s diagnosis, condition, and treatment plan as can be expected at this point. The vital signs have been stable. The patient´s condition is stable and appropriate for discharge from the emergency department.      The  patient will pursue further outpatient evaluation with the primary care physician or other designated or consulting physician as outlined in the discharge instructions. They are agreeable to this plan of care and follow-up instructions have been explained in detail. The patient has received these instructions in written format and has expressed an understanding of the discharge instructions. The patient is aware that any significant change in condition or worsening of symptoms should prompt an immediate return to this or the closest emergency department or call to 911.  I have explained discharge medications and the need for follow up with the patient/caretakers. This was also printed in the discharge instructions. Patient was discharged with the following medications and follow up:      Medication List        New Prescriptions      brompheniramine-pseudoephedrine-DM 30-2-10 MG/5ML syrup  Take 10 mL by mouth 4 (Four) Times a Day As Needed for Allergies.     fluticasone 50 MCG/ACT nasal spray  Commonly known as: FLONASE  Administer 2 sprays into the nostril(s) as directed by provider Daily.               Where to Get Your Medications        These medications [facility] [address] [place] [address] [facility] [address] [facility] [phone] [facility] [phone]   [address] [facility] [address]      Phone: [phone]   brompheniramine-pseudoephedrine-DM 30-2-10 MG/5ML syrup  fluticasone 50 MCG/ACT nasal spray      Provider, No Known  [facility] [id number]    Call   As needed       Final diagnoses:   Influenza A        ED Disposition       ED Disposition   Discharge    Condition   Stable    Comment   --               This medical record created using voice recognition software.             Linnea Mondragon, APRN  12/26/24 9924

## 2024-12-26 NOTE — DISCHARGE INSTRUCTIONS
Home.  Your workup today reveals that you have influenza A.    I have sent prescriptions to your pharmacy.  Please  and take as directed for treatment of your symptoms.  You may alternate Tylenol and ibuprofen for fever and pain relief.  Increase your fluid intake.  Eat well-balanced meals and get plenty of rest.  Wear a mask when you are around others or avoid contact with others until you have been fever free at least for 24 hours.    Call your primary care provider to schedule an appointment for follow-up as needed.    If symptoms worsen, change in presentation, or you develop new symptoms please seek medical attention or return to the ED for further evaluation.

## 2024-12-26 NOTE — Clinical Note
[facility] EMERGENCY ROOM  [address] [place] [address]  Phone: [phone]  Fax: [phone]    Aubree Krueger was seen and treated in our emergency department on 12/26/2024.  She may return to work on 12/29/2024.         Thank you for choosing [facility].    Linnea Mondragon APRN

## 2024-12-28 LAB — BACTERIA SPEC AEROBE CULT: NORMAL

## 2025-03-15 ENCOUNTER — HOSPITAL ENCOUNTER (EMERGENCY)
Facility: HOSPITAL | Age: 40
Discharge: HOME | End: 2025-03-15
Attending: EMERGENCY MEDICINE
Payer: MEDICAID

## 2025-03-15 ENCOUNTER — APPOINTMENT (OUTPATIENT)
Dept: GENERAL RADIOLOGY | Facility: HOSPITAL | Age: 40
End: 2025-03-15
Payer: MEDICAID

## 2025-03-15 ENCOUNTER — APPOINTMENT (OUTPATIENT)
Dept: CT IMAGING | Facility: HOSPITAL | Age: 40
End: 2025-03-15
Payer: MEDICAID

## 2025-03-15 VITALS
WEIGHT: 127.65 LBS | HEART RATE: 73 BPM | RESPIRATION RATE: 14 BRPM | HEIGHT: 68 IN | BODY MASS INDEX: 19.35 KG/M2 | SYSTOLIC BLOOD PRESSURE: 115 MMHG | DIASTOLIC BLOOD PRESSURE: 79 MMHG | OXYGEN SATURATION: 95 % | TEMPERATURE: 98 F

## 2025-03-15 DIAGNOSIS — N39.0 URINARY TRACT INFECTION WITHOUT HEMATURIA, SITE UNSPECIFIED: Primary | ICD-10-CM

## 2025-03-15 DIAGNOSIS — R42 VERTIGO: ICD-10-CM

## 2025-03-15 LAB
ALBUMIN SERPL-MCNC: 4.4 G/DL (ref 3.5–5.2)
ALBUMIN/GLOB SERPL: 1.4 G/DL
ALP SERPL-CCNC: 136 U/L (ref 39–117)
ALT SERPL W P-5'-P-CCNC: 97 U/L (ref 1–33)
ANION GAP SERPL CALCULATED.3IONS-SCNC: 11.2 MMOL/L (ref 5–15)
AST SERPL-CCNC: 177 U/L (ref 1–32)
BACTERIA UR QL AUTO: ABNORMAL /HPF
BASOPHILS # BLD AUTO: 0.05 10*3/MM3 (ref 0–0.2)
BASOPHILS NFR BLD AUTO: 0.6 % (ref 0–1.5)
BILIRUB SERPL-MCNC: 0.5 MG/DL (ref 0–1.2)
BILIRUB UR QL STRIP: NEGATIVE
BUN SERPL-MCNC: 13 MG/DL (ref 6–20)
BUN/CREAT SERPL: 16 (ref 7–25)
CALCIUM SPEC-SCNC: 9.7 MG/DL (ref 8.6–10.5)
CHLORIDE SERPL-SCNC: 100 MMOL/L (ref 98–107)
CLARITY UR: CLEAR
CO2 SERPL-SCNC: 23.8 MMOL/L (ref 22–29)
COLOR UR: YELLOW
CREAT SERPL-MCNC: 0.81 MG/DL (ref 0.57–1)
DEPRECATED RDW RBC AUTO: 51.8 FL (ref 37–54)
EGFRCR SERPLBLD CKD-EPI 2021: 95.4 ML/MIN/1.73
EOSINOPHIL # BLD AUTO: 0.07 10*3/MM3 (ref 0–0.4)
EOSINOPHIL NFR BLD AUTO: 0.9 % (ref 0.3–6.2)
ERYTHROCYTE [DISTWIDTH] IN BLOOD BY AUTOMATED COUNT: 15.3 % (ref 12.3–15.4)
GLOBULIN UR ELPH-MCNC: 3.2 GM/DL
GLUCOSE SERPL-MCNC: 97 MG/DL (ref 65–99)
GLUCOSE UR STRIP-MCNC: NEGATIVE MG/DL
HCT VFR BLD AUTO: 39.8 % (ref 34–46.6)
HGB BLD-MCNC: 12.9 G/DL (ref 12–15.9)
HGB UR QL STRIP.AUTO: ABNORMAL
HOLD SPECIMEN: NORMAL
HOLD SPECIMEN: NORMAL
HYALINE CASTS UR QL AUTO: ABNORMAL /LPF
IMM GRANULOCYTES # BLD AUTO: 0.02 10*3/MM3 (ref 0–0.05)
IMM GRANULOCYTES NFR BLD AUTO: 0.3 % (ref 0–0.5)
KETONES UR QL STRIP: ABNORMAL
LEUKOCYTE ESTERASE UR QL STRIP.AUTO: ABNORMAL
LYMPHOCYTES # BLD AUTO: 1.4 10*3/MM3 (ref 0.7–3.1)
LYMPHOCYTES NFR BLD AUTO: 18.1 % (ref 19.6–45.3)
MAGNESIUM SERPL-MCNC: 2.1 MG/DL (ref 1.6–2.6)
MCH RBC QN AUTO: 29.5 PG (ref 26.6–33)
MCHC RBC AUTO-ENTMCNC: 32.4 G/DL (ref 31.5–35.7)
MCV RBC AUTO: 90.9 FL (ref 79–97)
MONOCYTES # BLD AUTO: 0.6 10*3/MM3 (ref 0.1–0.9)
MONOCYTES NFR BLD AUTO: 7.8 % (ref 5–12)
NEUTROPHILS NFR BLD AUTO: 5.58 10*3/MM3 (ref 1.7–7)
NEUTROPHILS NFR BLD AUTO: 72.3 % (ref 42.7–76)
NITRITE UR QL STRIP: NEGATIVE
NRBC BLD AUTO-RTO: 0 /100 WBC (ref 0–0.2)
PH UR STRIP.AUTO: 7 [PH] (ref 5–8)
PLATELET # BLD AUTO: 305 10*3/MM3 (ref 140–450)
PMV BLD AUTO: 7.9 FL (ref 6–12)
POTASSIUM SERPL-SCNC: 4.1 MMOL/L (ref 3.5–5.2)
PROT SERPL-MCNC: 7.6 G/DL (ref 6–8.5)
PROT UR QL STRIP: NEGATIVE
QT INTERVAL: 359 MS
QTC INTERVAL: 410 MS
RBC # BLD AUTO: 4.38 10*6/MM3 (ref 3.77–5.28)
RBC # UR STRIP: ABNORMAL /HPF
REF LAB TEST METHOD: ABNORMAL
SODIUM SERPL-SCNC: 135 MMOL/L (ref 136–145)
SP GR UR STRIP: 1.02 (ref 1–1.03)
SQUAMOUS #/AREA URNS HPF: ABNORMAL /HPF
TROPONIN T SERPL HS-MCNC: <6 NG/L
UROBILINOGEN UR QL STRIP: ABNORMAL
WBC # UR STRIP: ABNORMAL /HPF
WBC NRBC COR # BLD AUTO: 7.72 10*3/MM3 (ref 3.4–10.8)
WHOLE BLOOD HOLD COAG: NORMAL
WHOLE BLOOD HOLD SPECIMEN: NORMAL

## 2025-03-15 PROCEDURE — 83735 ASSAY OF MAGNESIUM: CPT | Performed by: EMERGENCY MEDICINE

## 2025-03-15 PROCEDURE — 25010000002 CEFTRIAXONE PER 250 MG: Performed by: EMERGENCY MEDICINE

## 2025-03-15 PROCEDURE — 96374 THER/PROPH/DIAG INJ IV PUSH: CPT

## 2025-03-15 PROCEDURE — 25810000003 SODIUM CHLORIDE 0.9 % SOLUTION: Performed by: EMERGENCY MEDICINE

## 2025-03-15 PROCEDURE — 99284 EMERGENCY DEPT VISIT MOD MDM: CPT

## 2025-03-15 PROCEDURE — 81001 URINALYSIS AUTO W/SCOPE: CPT | Performed by: EMERGENCY MEDICINE

## 2025-03-15 PROCEDURE — 84484 ASSAY OF TROPONIN QUANT: CPT | Performed by: EMERGENCY MEDICINE

## 2025-03-15 PROCEDURE — 93005 ELECTROCARDIOGRAM TRACING: CPT | Performed by: EMERGENCY MEDICINE

## 2025-03-15 PROCEDURE — 70450 CT HEAD/BRAIN W/O DYE: CPT

## 2025-03-15 PROCEDURE — 80053 COMPREHEN METABOLIC PANEL: CPT | Performed by: EMERGENCY MEDICINE

## 2025-03-15 PROCEDURE — 85025 COMPLETE CBC W/AUTO DIFF WBC: CPT | Performed by: EMERGENCY MEDICINE

## 2025-03-15 PROCEDURE — 71045 X-RAY EXAM CHEST 1 VIEW: CPT

## 2025-03-15 RX ORDER — SODIUM CHLORIDE 0.9 % (FLUSH) 0.9 %
10 SYRINGE (ML) INJECTION AS NEEDED
Status: DISCONTINUED | OUTPATIENT
Start: 2025-03-15 | End: 2025-03-15 | Stop reason: HOSPADM

## 2025-03-15 RX ORDER — CEPHALEXIN 500 MG/1
500 CAPSULE ORAL 3 TIMES DAILY
Qty: 9 CAPSULE | Refills: 0 | Status: SHIPPED | OUTPATIENT
Start: 2025-03-15 | End: 2025-03-18

## 2025-03-15 RX ORDER — MECLIZINE HYDROCHLORIDE 25 MG/1
50 TABLET ORAL 3 TIMES DAILY PRN
Qty: 20 TABLET | Refills: 0 | Status: SHIPPED | OUTPATIENT
Start: 2025-03-15

## 2025-03-15 RX ORDER — MECLIZINE HYDROCHLORIDE 25 MG/1
50 TABLET ORAL ONCE
Status: COMPLETED | OUTPATIENT
Start: 2025-03-15 | End: 2025-03-15

## 2025-03-15 RX ADMIN — MECLIZINE HYDROCHLORIDE 50 MG: 25 TABLET ORAL at 16:36

## 2025-03-15 RX ADMIN — SODIUM CHLORIDE 1000 ML: 9 INJECTION, SOLUTION INTRAVENOUS at 14:58

## 2025-03-15 RX ADMIN — CEFTRIAXONE SODIUM 1000 MG: 1 INJECTION, POWDER, FOR SOLUTION INTRAMUSCULAR; INTRAVENOUS at 16:36

## 2025-03-15 NOTE — Clinical Note
[facility] EMERGENCY ROOM  [address] [place] [address]  Phone: [phone]  Fax: [phone]    Aubree Krueger was seen and treated in our emergency department on 3/15/2025.  She may return to work on 03/16/2025.         Thank you for choosing [facility].    Marco Antonio Weiss MD

## 2025-03-15 NOTE — ED PROVIDER NOTES
Time: 5:35 PM EDT  Date of encounter:  3/15/2025  Independent Historian/Clinical History and Information was obtained by:   Patient    History is limited by: N/A    Chief Complaint: Lightheadedness      History of Present Illness:  Patient is a 40 y.o. year old female who presents to the emergency department for evaluation of lightheadedness.  Patient reports intermittent lightheadedness today.      Patient Care Team  Primary Care Provider: Provider, No Known    Past Medical History:     Allergies   Allergen Reactions    Sulfa Antibiotics Anaphylaxis     Past Medical History:   Diagnosis Date    Anemia 2003    1st pregnancy    Bipolar disorder 2000    Depression     Hepatitis C     finished tx 2021    PONV (postoperative nausea and vomiting) 2004    Substance abuse 2007    Varicella      Past Surgical History:   Procedure Laterality Date    KNEE SURGERY Left     2 surgeries    WISDOM TOOTH EXTRACTION  2007     Family History   Problem Relation Age of Onset    Breast cancer Paternal Aunt     Breast cancer Paternal Aunt        Home Medications:  Prior to Admission medications    Medication Sig Start Date End Date Taking? Authorizing Provider   benzonatate (TESSALON) 200 MG capsule Take 1 capsule by mouth 3 (Three) Times a Day As Needed for Cough. 3/5/25   Ksenia Rojas PA-C   cephalexin (KEFLEX) 500 MG capsule Take 1 capsule by mouth 3 (Three) Times a Day for 3 days. 3/15/25 3/18/25  Marco Antonio Weiss MD   meclizine (ANTIVERT) 25 MG tablet Take 2 tablets by mouth 3 (Three) Times a Day As Needed for Dizziness. 3/15/25   Marco Antonio Weiss MD   methylPREDNISolone (MEDROL) 4 MG dose pack Take as directed on package instructions. 3/5/25   Ksenia Rojas PA-C        Social History:   Social History     Tobacco Use    Smoking status: Heavy Smoker     Current packs/day: 1.00     Average packs/day: 1 pack/day for 15.0 years (15.0 ttl pk-yrs)     Types: Cigarettes     Passive exposure: Never    Smokeless tobacco: Never    Tobacco  "comments:     Workin on quittting   Vaping Use    Vaping status: Former    Quit date: 6/13/2022    Substances: Nicotine   Substance Use Topics    Alcohol use: No    Drug use: Not Currently     Types: Fentanyl, Heroin, Marijuana     Comment: Quit in 2020         Review of Systems:  Review of Systems   Constitutional:  Negative for chills and fever.   HENT:  Negative for congestion, rhinorrhea and sore throat.    Eyes:  Negative for pain and visual disturbance.   Respiratory:  Negative for apnea, cough, chest tightness and shortness of breath.    Cardiovascular:  Negative for chest pain and palpitations.   Gastrointestinal:  Negative for abdominal pain, diarrhea, nausea and vomiting.   Genitourinary:  Negative for difficulty urinating and dysuria.   Musculoskeletal:  Negative for joint swelling and myalgias.   Skin:  Negative for color change.   Neurological:  Negative for seizures and headaches.   Psychiatric/Behavioral: Negative.     All other systems reviewed and are negative.       Physical Exam:  /79 (Patient Position: Lying)   Pulse 73   Temp 98 °F (36.7 °C) (Oral)   Resp 14   Ht 172.7 cm (68\")   Wt 57.9 kg (127 lb 10.3 oz)   LMP 03/10/2025 (Approximate)   SpO2 95%   BMI 19.41 kg/m²     Physical Exam  Vitals and nursing note reviewed.   Constitutional:       General: She is not in acute distress.     Appearance: Normal appearance. She is not toxic-appearing.   HENT:      Head: Normocephalic and atraumatic.      Jaw: There is normal jaw occlusion.   Eyes:      General: Lids are normal.      Extraocular Movements: Extraocular movements intact.      Conjunctiva/sclera: Conjunctivae normal.      Pupils: Pupils are equal, round, and reactive to light.   Cardiovascular:      Rate and Rhythm: Normal rate and regular rhythm.      Pulses: Normal pulses.      Heart sounds: Normal heart sounds.   Pulmonary:      Effort: Pulmonary effort is normal. No respiratory distress.      Breath sounds: Normal breath " sounds. No wheezing or rhonchi.   Abdominal:      General: Abdomen is flat.      Palpations: Abdomen is soft.      Tenderness: There is no abdominal tenderness. There is no guarding or rebound.   Musculoskeletal:         General: Normal range of motion.      Cervical back: Normal range of motion and neck supple.      Right lower leg: No edema.      Left lower leg: No edema.   Skin:     General: Skin is warm and dry.   Neurological:      Mental Status: She is alert and oriented to person, place, and time. Mental status is at baseline.   Psychiatric:         Mood and Affect: Mood normal.                    Medical Decision Making:      Comorbidities that affect care:    Anxiety, bipolar disorder    External Notes reviewed:    Previous Clinic Note: Urgent care visit for URI management      The following orders were placed and all results were independently analyzed by me:  Orders Placed This Encounter   Procedures    XR Chest 1 View    CT Head Without Contrast    [place] Draw    Comprehensive Metabolic Panel    High Sensitivity Troponin T    Magnesium    Urinalysis With Microscopic If Indicated (No Culture) - Urine, Clean Catch    CBC Auto Differential    Urinalysis, Microscopic Only - Urine, Clean Catch    NPO Diet NPO Type: Strict NPO    Undress & Gown    Continuous Pulse Oximetry    Vital Signs    Orthostatic Blood Pressure    Oxygen Therapy- Nasal Cannula; Titrate 1-6 LPM Per SpO2; 90 - 95%    POC Glucose Once    ECG 12 Lead ED Triage Standing Order; Weak / Dizzy / AMS    Insert Peripheral IV    Fall Precautions    CBC & Differential    Green Top (Gel)    Lavender Top    Gold Top - SST    Light Blue Top       Medications Given in the Emergency Department:  Medications   sodium chloride 0.9 % flush 10 mL (has no administration in time range)   sodium chloride 0.9 % bolus 1,000 mL (0 mL Intravenous Stopped 3/15/25 1618)   cefTRIAXone (ROCEPHIN) in NS 1 gram/10ml IV PUSH syringe (1,000 mg Intravenous Given 3/15/25  1636)   meclizine (ANTIVERT) tablet 50 mg (50 mg Oral Given 3/15/25 1636)        ED Course:         Labs:    Lab Results (last 24 hours)       Procedure Component Value Units Date/Time    CBC & Differential [[id number]]  (Abnormal) Collected: 03/15/25 1323    Specimen: Blood Updated: 03/15/25 1332    Narrative:      The following orders were created for panel order CBC & Differential.  Procedure                               Abnormality         Status                     ---------                               -----------         ------                     CBC Auto Differential[[id number]]        Abnormal            Final result                 Please view results for these tests on the individual orders.    Comprehensive Metabolic Panel [[id number]]  (Abnormal) Collected: 03/15/25 1323    Specimen: Blood Updated: 03/15/25 1359     Glucose 97 mg/dL      BUN 13 mg/dL      Creatinine 0.81 mg/dL      Sodium 135 mmol/L      Potassium 4.1 mmol/L      Chloride 100 mmol/L      CO2 23.8 mmol/L      Calcium 9.7 mg/dL      Total Protein 7.6 g/dL      Albumin 4.4 g/dL      ALT (SGPT) 97 U/L      AST (SGOT) 177 U/L      Alkaline Phosphatase 136 U/L      Total Bilirubin 0.5 mg/dL      Globulin 3.2 gm/dL      A/G Ratio 1.4 g/dL      BUN/Creatinine Ratio 16.0     Anion Gap 11.2 mmol/L      eGFR 95.4 mL/min/1.73     Narrative:      GFR Categories in Chronic Kidney Disease (CKD)      GFR Category          GFR (mL/min/1.73)    Interpretation  G1                     90 or greater         Normal or high (1)  G2                      60-89                Mild decrease (1)  G3a                   45-59                Mild to moderate decrease  G3b                   30-44                Moderate to severe decrease  G4                    15-29                Severe decrease  G5                    14 or less           Kidney failure          (1)In the absence of evidence of kidney disease, neither GFR category G1 or G2 fulfill the criteria for  CKD.    eGFR calculation 2021 CKD-EPI creatinine equation, which does not include race as a factor    High Sensitivity Troponin T [[id number]]  (Normal) Collected: 03/15/25 1323    Specimen: Blood Updated: 03/15/25 1359     HS Troponin T <6 ng/L     Narrative:      High Sensitive Troponin T Reference Range:  <14.0 ng/L- Negative Female for AMI  <22.0 ng/L- Negative Male for AMI  >=14 - Abnormal Female indicating possible myocardial injury.  >=22 - Abnormal Male indicating possible myocardial injury.   Clinicians would have to utilize clinical acumen, EKG, Troponin, and serial changes to determine if it is an Acute Myocardial Infarction or myocardial injury due to an underlying chronic condition.         Magnesium [[id number]]  (Normal) Collected: 03/15/25 1323    Specimen: Blood Updated: 03/15/25 1359     Magnesium 2.1 mg/dL     CBC Auto Differential [[id number]]  (Abnormal) Collected: 03/15/25 1323    Specimen: Blood Updated: 03/15/25 1332     WBC 7.72 10*3/mm3      RBC 4.38 10*6/mm3      Hemoglobin 12.9 g/dL      Hematocrit 39.8 %      MCV 90.9 fL      MCH 29.5 pg      MCHC 32.4 g/dL      RDW 15.3 %      RDW-SD 51.8 fl      MPV 7.9 fL      Platelets 305 10*3/mm3      Neutrophil % 72.3 %      Lymphocyte % 18.1 %      Monocyte % 7.8 %      Eosinophil % 0.9 %      Basophil % 0.6 %      Immature Grans % 0.3 %      Neutrophils, Absolute 5.58 10*3/mm3      Lymphocytes, Absolute 1.40 10*3/mm3      Monocytes, Absolute 0.60 10*3/mm3      Eosinophils, Absolute 0.07 10*3/mm3      Basophils, Absolute 0.05 10*3/mm3      Immature Grans, Absolute 0.02 10*3/mm3      nRBC 0.0 /100 WBC     Urinalysis With Microscopic If Indicated (No Culture) - Urine, Clean Catch [[id number]]  (Abnormal) Collected: 03/15/25 1340    Specimen: Urine, Clean Catch Updated: 03/15/25 1355     Color, UA Yellow     Appearance, UA Clear     pH, UA 7.0     Specific Gravity, UA 1.023     Glucose, UA Negative     Ketones, UA Trace     Bilirubin, UA Negative      Blood, UA Trace     Protein, UA Negative     Leuk Esterase, UA Small (1+)     Nitrite, UA Negative     Urobilinogen, UA 0.2 E.U./dL    Urinalysis, Microscopic Only - Urine, Clean Catch [[id number]]  (Abnormal) Collected: 03/15/25 1340    Specimen: Urine, Clean Catch Updated: 03/15/25 1355     RBC, UA 0-2 /HPF      WBC, UA 11-20 /HPF      Bacteria, UA 1+ /HPF      Squamous Epithelial Cells, UA 3-6 /HPF      Hyaline Casts, UA None Seen /LPF      Methodology Automated Microscopy             Imaging:    CT Head Without Contrast  Result Date: 3/15/2025  CT HEAD WO CONTRAST Date of Exam: 3/15/2025 3:22 PM EDT Indication: dizziness. Comparison: Head CT 10/11/2018 Technique: Axial CT images were obtained of the head without contrast administration.  Reconstructed coronal and sagittal images were also obtained. Automated exposure control and iterative construction methods were used. Findings: No acute intracranial hemorrhage.Intact appearing gray-white differentiation.No extra-axial fluid collection.No significant mass effect. No hydrocephalus. Brain volume appears age-appropriate. Mild scattered mucosal thickening in the paranasal sinuses.Mastoid air cells are essentially clear.Included globes and orbits appear unremarkable by CT. No acute or aggressive appearing bony or extracranial soft tissue process.     Impression: No acute intracranial findings. Electronically Signed: Aubrey Aguirre  3/15/2025 4:04 PM EDT  Workstation ID: [id number]    XR Chest 1 View  Result Date: 3/15/2025  XR CHEST 1 VW Date of Exam: 3/15/2025 1:20 PM EDT Indication: Weakness, dizziness, and altered mental status. Comparison: 7/23/2019. Findings: The heart size is normal.  The pulmonary vascular markings are normal.  The lungs and pleural are clear.  The bony thorax is normal for age.     Impression: No active disease. Electronically Signed: Tyrese Roberson MD  3/15/2025 1:35 PM EDT  Workstation ID: [id number]        Differential Diagnosis and  Discussion:    Dizziness: Based on the patient's history, signs, and symptoms, the diffential diagnosis includes but is not limited to meningitis, stroke, sepsis, subarachnoid hemorrhage, intracranial bleeding, encephalitis, vertigo, electrolyte imbalance, and metabolic disorders.    PROCEDURES:    Labs were collected in the emergency department and all labs were reviewed and interpreted by me.  X-ray were performed in the emergency department and all X-ray impressions were independently interpreted by me.  An EKG was performed and the EKG was interpreted by me.  CT scan was performed in the emergency department and the CT scan radiology impression was interpreted by me.    ECG 12 Lead ED Triage Standing Order; Weak / Dizzy / AMS   Preliminary Result   HEART RATE=78  bpm   RR Zhmyblgq=890  ms   CA Xzqixwcz=763  ms   P Horizontal Axis=12  deg   P Front Axis=66  deg   QRSD Interval=79  ms   QT Urbyhyxm=356  ms   BAcZ=718  ms   QRS Axis=57  deg   T Wave Axis=56  deg   - OTHERWISE NORMAL ECG -   Sinus rhythm   Borderline short CA interval   Date and Time of Study:2025-03-15 13:39:36          Procedures    MDM  Number of Diagnoses or Management Options  Urinary tract infection without hematuria, site unspecified  Vertigo  Diagnosis management comments: In summary this is a 40-year-old female who presents to the emergency department for evaluation of lightheadedness.  CBC independently reviewed and interpreted by me and shows no critical abnormalities.  CMP independently reviewed and interpreted by me and shows no critical abnormalities.  High-sensitivity troponin dependent reviewed interpreted by me is unremarkable negative for acute pathology.  Urinalysis independent reviewed interpreted by me is remarkable for urinary tract infection.  Chest x-ray and CT brain reviewed by me are unremarkable and negative for acute pathology.  Patient received IV fluids, IV Rocephin and meclizine p.o. with improvement of her symptoms.   Very strict return to ER and follow-up instructions have been provided to the patient.  Discharge prescriptions for meclizine and antibiotics provided.                       Patient Care Considerations:    CONSULT: I considered consulting neurology, however no emergent indication      Consultants/Shared Management Plan:    None    Social Determinants of Health:    Patient is independent, reliable, and has access to care.       Disposition and Care Coordination:    Discharged: I considered escalation of care by admitting this patient to the hospital, however dizziness improving    I have explained the patient´s condition, diagnoses and treatment plan based on the information available to me at this time. I have answered questions and addressed any concerns. The patient has a good  understanding of the patient´s diagnosis, condition, and treatment plan as can be expected at this point. The vital signs have been stable. The patient´s condition is stable and appropriate for discharge from the emergency department.      The patient will pursue further outpatient evaluation with the primary care physician or other designated or consulting physician as outlined in the discharge instructions. They are agreeable to this plan of care and follow-up instructions have been explained in detail. The patient has received these instructions in written format and has expressed an understanding of the discharge instructions. The patient is aware that any significant change in condition or worsening of symptoms should prompt an immediate return to this or the closest emergency department or call to 911.  I have explained discharge medications and the need for follow up with the patient/caretakers. This was also printed in the discharge instructions. Patient was discharged with the following medications and follow up:      Medication List        New Prescriptions      cephalexin 500 MG capsule  Commonly known as: KEFLEX  Take 1 capsule  by mouth 3 (Three) Times a Day for 3 days.     meclizine 25 MG tablet  Commonly known as: ANTIVERT  Take 2 tablets by mouth 3 (Three) Times a Day As Needed for Dizziness.               Where to Get Your Medications        These medications were sent to [facility] [address] [place] [address] [employer] [facility] - [phone] [facility] [phone]   [address] [facility] [address]      Phone: [phone]   cephalexin 500 MG capsule  meclizine 25 MG tablet      Provider, No Known  [facility] [place] [address]    In 1 week         Final diagnoses:   Urinary tract infection without hematuria, site unspecified   Vertigo        ED Disposition       ED Disposition   Discharge    Condition   Stable    Comment   --               This medical record created using voice recognition software.             Marco Antonio Weiss MD  03/15/25 [id number]

## 2025-03-19 LAB
QT INTERVAL: 359 MS
QTC INTERVAL: 410 MS

## 2025-06-20 ENCOUNTER — HOSPITAL ENCOUNTER (INPATIENT)
Facility: HOSPITAL | Age: 40
LOS: 3 days | Discharge: HOME | End: 2025-06-23
Attending: EMERGENCY MEDICINE | Admitting: STUDENT IN AN ORGANIZED HEALTH CARE EDUCATION/TRAINING PROGRAM

## 2025-06-20 ENCOUNTER — APPOINTMENT (OUTPATIENT)
Dept: CT IMAGING | Facility: HOSPITAL | Age: 40
End: 2025-06-20

## 2025-06-20 DIAGNOSIS — K85.20 ALCOHOL-INDUCED ACUTE PANCREATITIS, UNSPECIFIED COMPLICATION STATUS: Primary | ICD-10-CM

## 2025-06-20 PROBLEM — K85.90 ACUTE PANCREATITIS: Status: ACTIVE | Noted: 2025-06-20

## 2025-06-20 LAB
ALBUMIN SERPL-MCNC: 4.6 G/DL (ref 3.5–5.2)
ALBUMIN/GLOB SERPL: 1.6 G/DL
ALP SERPL-CCNC: 179 U/L (ref 39–117)
ALT SERPL W P-5'-P-CCNC: 93 U/L (ref 1–33)
ANION GAP SERPL CALCULATED.3IONS-SCNC: 14.3 MMOL/L (ref 5–15)
AST SERPL-CCNC: 152 U/L (ref 1–32)
BACTERIA UR QL AUTO: ABNORMAL /HPF
BASOPHILS # BLD AUTO: 0.03 10*3/MM3 (ref 0–0.2)
BASOPHILS NFR BLD AUTO: 0.4 % (ref 0–1.5)
BILIRUB SERPL-MCNC: 1.5 MG/DL (ref 0–1.2)
BILIRUB UR QL STRIP: ABNORMAL
BUN SERPL-MCNC: 6 MG/DL (ref 6–20)
BUN/CREAT SERPL: 6.7 (ref 7–25)
CALCIUM SPEC-SCNC: 9.9 MG/DL (ref 8.6–10.5)
CHLORIDE SERPL-SCNC: 100 MMOL/L (ref 98–107)
CLARITY UR: ABNORMAL
CO2 SERPL-SCNC: 22.7 MMOL/L (ref 22–29)
COLOR UR: YELLOW
CREAT SERPL-MCNC: 0.9 MG/DL (ref 0.57–1)
DEPRECATED RDW RBC AUTO: 53.9 FL (ref 37–54)
EGFRCR SERPLBLD CKD-EPI 2021: 84.1 ML/MIN/1.73
EOSINOPHIL # BLD AUTO: 0.05 10*3/MM3 (ref 0–0.4)
EOSINOPHIL NFR BLD AUTO: 0.7 % (ref 0.3–6.2)
ERYTHROCYTE [DISTWIDTH] IN BLOOD BY AUTOMATED COUNT: 15.8 % (ref 12.3–15.4)
GLOBULIN UR ELPH-MCNC: 2.9 GM/DL
GLUCOSE SERPL-MCNC: 123 MG/DL (ref 65–99)
GLUCOSE UR STRIP-MCNC: NEGATIVE MG/DL
HCG INTACT+B SERPL-ACNC: <0.5 MIU/ML
HCT VFR BLD AUTO: 43.9 % (ref 34–46.6)
HGB BLD-MCNC: 14.2 G/DL (ref 12–15.9)
HGB UR QL STRIP.AUTO: ABNORMAL
HOLD SPECIMEN: NORMAL
HOLD SPECIMEN: NORMAL
HYALINE CASTS UR QL AUTO: ABNORMAL /LPF
IMM GRANULOCYTES # BLD AUTO: 0.03 10*3/MM3 (ref 0–0.05)
IMM GRANULOCYTES NFR BLD AUTO: 0.4 % (ref 0–0.5)
KETONES UR QL STRIP: ABNORMAL
LEUKOCYTE ESTERASE UR QL STRIP.AUTO: ABNORMAL
LIPASE SERPL-CCNC: 676 U/L (ref 13–60)
LYMPHOCYTES # BLD AUTO: 0.66 10*3/MM3 (ref 0.7–3.1)
LYMPHOCYTES NFR BLD AUTO: 9.2 % (ref 19.6–45.3)
MCH RBC QN AUTO: 30.3 PG (ref 26.6–33)
MCHC RBC AUTO-ENTMCNC: 32.3 G/DL (ref 31.5–35.7)
MCV RBC AUTO: 93.8 FL (ref 79–97)
MONOCYTES # BLD AUTO: 0.41 10*3/MM3 (ref 0.1–0.9)
MONOCYTES NFR BLD AUTO: 5.7 % (ref 5–12)
NEUTROPHILS NFR BLD AUTO: 6.02 10*3/MM3 (ref 1.7–7)
NEUTROPHILS NFR BLD AUTO: 83.6 % (ref 42.7–76)
NITRITE UR QL STRIP: POSITIVE
NRBC BLD AUTO-RTO: 0 /100 WBC (ref 0–0.2)
PH UR STRIP.AUTO: 6 [PH] (ref 5–8)
PLATELET # BLD AUTO: 240 10*3/MM3 (ref 140–450)
PMV BLD AUTO: 8.7 FL (ref 6–12)
POTASSIUM SERPL-SCNC: 3.9 MMOL/L (ref 3.5–5.2)
PROT SERPL-MCNC: 7.5 G/DL (ref 6–8.5)
PROT UR QL STRIP: ABNORMAL
RBC # BLD AUTO: 4.68 10*6/MM3 (ref 3.77–5.28)
RBC # UR STRIP: ABNORMAL /HPF
REF LAB TEST METHOD: ABNORMAL
SODIUM SERPL-SCNC: 137 MMOL/L (ref 136–145)
SP GR UR STRIP: 1.02 (ref 1–1.03)
SQUAMOUS #/AREA URNS HPF: ABNORMAL /HPF
UROBILINOGEN UR QL STRIP: ABNORMAL
WBC # UR STRIP: ABNORMAL /HPF
WBC NRBC COR # BLD AUTO: 7.2 10*3/MM3 (ref 3.4–10.8)
WHOLE BLOOD HOLD COAG: NORMAL
WHOLE BLOOD HOLD SPECIMEN: NORMAL

## 2025-06-20 PROCEDURE — 81001 URINALYSIS AUTO W/SCOPE: CPT | Performed by: EMERGENCY MEDICINE

## 2025-06-20 PROCEDURE — 25010000002 MORPHINE PER 10 MG: Performed by: EMERGENCY MEDICINE

## 2025-06-20 PROCEDURE — 25010000002 THIAMINE PER 100 MG: Performed by: FAMILY MEDICINE

## 2025-06-20 PROCEDURE — 25010000002 MORPHINE PER 10 MG: Performed by: FAMILY MEDICINE

## 2025-06-20 PROCEDURE — 25010000002 ONDANSETRON PER 1 MG: Performed by: EMERGENCY MEDICINE

## 2025-06-20 PROCEDURE — 25510000001 IOPAMIDOL PER 1 ML: Performed by: EMERGENCY MEDICINE

## 2025-06-20 PROCEDURE — 80053 COMPREHEN METABOLIC PANEL: CPT | Performed by: EMERGENCY MEDICINE

## 2025-06-20 PROCEDURE — 74177 CT ABD & PELVIS W/CONTRAST: CPT

## 2025-06-20 PROCEDURE — 85025 COMPLETE CBC W/AUTO DIFF WBC: CPT | Performed by: EMERGENCY MEDICINE

## 2025-06-20 PROCEDURE — 84702 CHORIONIC GONADOTROPIN TEST: CPT | Performed by: EMERGENCY MEDICINE

## 2025-06-20 PROCEDURE — 25810000003 DEXTROSE 5 % AND SODIUM CHLORIDE 0.9 % 5-0.9 % SOLUTION: Performed by: STUDENT IN AN ORGANIZED HEALTH CARE EDUCATION/TRAINING PROGRAM

## 2025-06-20 PROCEDURE — 25810000003 SODIUM CHLORIDE 0.9 % SOLUTION

## 2025-06-20 PROCEDURE — 25810000003 DEXTROSE 5 % AND SODIUM CHLORIDE 0.9 % 5-0.9 % SOLUTION: Performed by: FAMILY MEDICINE

## 2025-06-20 PROCEDURE — 87086 URINE CULTURE/COLONY COUNT: CPT

## 2025-06-20 PROCEDURE — G0378 HOSPITAL OBSERVATION PER HR: HCPCS

## 2025-06-20 PROCEDURE — 25010000002 CEFTRIAXONE PER 250 MG

## 2025-06-20 PROCEDURE — 94761 N-INVAS EAR/PLS OXIMETRY MLT: CPT

## 2025-06-20 PROCEDURE — 36415 COLL VENOUS BLD VENIPUNCTURE: CPT

## 2025-06-20 PROCEDURE — 99285 EMERGENCY DEPT VISIT HI MDM: CPT

## 2025-06-20 PROCEDURE — 99222 1ST HOSP IP/OBS MODERATE 55: CPT | Performed by: FAMILY MEDICINE

## 2025-06-20 PROCEDURE — 25010000002 DIAZEPAM PER 5 MG: Performed by: FAMILY MEDICINE

## 2025-06-20 PROCEDURE — 83690 ASSAY OF LIPASE: CPT | Performed by: EMERGENCY MEDICINE

## 2025-06-20 RX ORDER — POLYETHYLENE GLYCOL 3350 17 G
2 POWDER IN PACKET (EA) ORAL
Status: DISCONTINUED | OUTPATIENT
Start: 2025-06-20 | End: 2025-06-23 | Stop reason: HOSPADM

## 2025-06-20 RX ORDER — FOLIC ACID 1 MG/1
1 TABLET ORAL DAILY
Status: DISCONTINUED | OUTPATIENT
Start: 2025-06-20 | End: 2025-06-23 | Stop reason: HOSPADM

## 2025-06-20 RX ORDER — LORAZEPAM 0.5 MG/1
1 TABLET ORAL DAILY
Status: DISCONTINUED | OUTPATIENT
Start: 2025-06-24 | End: 2025-06-23 | Stop reason: HOSPADM

## 2025-06-20 RX ORDER — LORAZEPAM 0.5 MG/1
1 TABLET ORAL EVERY 6 HOURS
Status: DISPENSED | OUTPATIENT
Start: 2025-06-21 | End: 2025-06-22

## 2025-06-20 RX ORDER — ONDANSETRON 2 MG/ML
4 INJECTION INTRAMUSCULAR; INTRAVENOUS EVERY 6 HOURS PRN
Status: DISCONTINUED | OUTPATIENT
Start: 2025-06-20 | End: 2025-06-23 | Stop reason: HOSPADM

## 2025-06-20 RX ORDER — IOPAMIDOL 755 MG/ML
100 INJECTION, SOLUTION INTRAVASCULAR
Status: COMPLETED | OUTPATIENT
Start: 2025-06-20 | End: 2025-06-20

## 2025-06-20 RX ORDER — POLYETHYLENE GLYCOL 3350 17 G/17G
17 POWDER, FOR SOLUTION ORAL DAILY PRN
Status: DISCONTINUED | OUTPATIENT
Start: 2025-06-20 | End: 2025-06-23 | Stop reason: HOSPADM

## 2025-06-20 RX ORDER — SODIUM CHLORIDE 0.9 % (FLUSH) 0.9 %
10 SYRINGE (ML) INJECTION AS NEEDED
Status: DISCONTINUED | OUTPATIENT
Start: 2025-06-20 | End: 2025-06-23 | Stop reason: HOSPADM

## 2025-06-20 RX ORDER — BISACODYL 10 MG
10 SUPPOSITORY, RECTAL RECTAL DAILY PRN
Status: DISCONTINUED | OUTPATIENT
Start: 2025-06-20 | End: 2025-06-23 | Stop reason: HOSPADM

## 2025-06-20 RX ORDER — DIAZEPAM 10 MG/2ML
10 INJECTION, SOLUTION INTRAMUSCULAR; INTRAVENOUS
Status: DISCONTINUED | OUTPATIENT
Start: 2025-06-20 | End: 2025-06-23 | Stop reason: HOSPADM

## 2025-06-20 RX ORDER — SODIUM CHLORIDE 0.9 % (FLUSH) 0.9 %
10 SYRINGE (ML) INJECTION EVERY 12 HOURS SCHEDULED
Status: DISCONTINUED | OUTPATIENT
Start: 2025-06-20 | End: 2025-06-23 | Stop reason: HOSPADM

## 2025-06-20 RX ORDER — THIAMINE HYDROCHLORIDE 100 MG/ML
200 INJECTION, SOLUTION INTRAMUSCULAR; INTRAVENOUS EVERY 8 HOURS SCHEDULED
Status: DISCONTINUED | OUTPATIENT
Start: 2025-06-20 | End: 2025-06-23 | Stop reason: HOSPADM

## 2025-06-20 RX ORDER — ALBUTEROL SULFATE 0.83 MG/ML
2.5 SOLUTION RESPIRATORY (INHALATION) EVERY 6 HOURS PRN
Status: DISCONTINUED | OUTPATIENT
Start: 2025-06-20 | End: 2025-06-23 | Stop reason: HOSPADM

## 2025-06-20 RX ORDER — DEXTROSE MONOHYDRATE AND SODIUM CHLORIDE 5; .9 G/100ML; G/100ML
150 INJECTION, SOLUTION INTRAVENOUS CONTINUOUS
Status: ACTIVE | OUTPATIENT
Start: 2025-06-20 | End: 2025-06-22

## 2025-06-20 RX ORDER — DIAZEPAM 5 MG/1
20 TABLET ORAL
Status: DISCONTINUED | OUTPATIENT
Start: 2025-06-20 | End: 2025-06-23 | Stop reason: HOSPADM

## 2025-06-20 RX ORDER — BISACODYL 5 MG/1
5 TABLET, DELAYED RELEASE ORAL DAILY PRN
Status: DISCONTINUED | OUTPATIENT
Start: 2025-06-20 | End: 2025-06-23 | Stop reason: HOSPADM

## 2025-06-20 RX ORDER — SODIUM CHLORIDE 9 MG/ML
40 INJECTION, SOLUTION INTRAVENOUS AS NEEDED
Status: DISCONTINUED | OUTPATIENT
Start: 2025-06-20 | End: 2025-06-23 | Stop reason: HOSPADM

## 2025-06-20 RX ORDER — ACETAMINOPHEN 325 MG/1
650 TABLET ORAL EVERY 6 HOURS PRN
Status: DISCONTINUED | OUTPATIENT
Start: 2025-06-20 | End: 2025-06-23 | Stop reason: HOSPADM

## 2025-06-20 RX ORDER — NALOXONE HCL 0.4 MG/ML
0.4 VIAL (ML) INJECTION
Status: DISCONTINUED | OUTPATIENT
Start: 2025-06-20 | End: 2025-06-20

## 2025-06-20 RX ORDER — DIAZEPAM 5 MG/1
10 TABLET ORAL
Status: DISCONTINUED | OUTPATIENT
Start: 2025-06-20 | End: 2025-06-23 | Stop reason: HOSPADM

## 2025-06-20 RX ORDER — MORPHINE SULFATE 2 MG/ML
1 INJECTION, SOLUTION INTRAMUSCULAR; INTRAVENOUS
Status: DISCONTINUED | OUTPATIENT
Start: 2025-06-20 | End: 2025-06-23 | Stop reason: HOSPADM

## 2025-06-20 RX ORDER — ENOXAPARIN SODIUM 100 MG/ML
40 INJECTION SUBCUTANEOUS DAILY
Status: DISCONTINUED | OUTPATIENT
Start: 2025-06-20 | End: 2025-06-23 | Stop reason: HOSPADM

## 2025-06-20 RX ORDER — DIAZEPAM 10 MG/2ML
15 INJECTION, SOLUTION INTRAMUSCULAR; INTRAVENOUS
Status: DISCONTINUED | OUTPATIENT
Start: 2025-06-20 | End: 2025-06-23 | Stop reason: HOSPADM

## 2025-06-20 RX ORDER — DIAZEPAM 10 MG/2ML
20 INJECTION, SOLUTION INTRAMUSCULAR; INTRAVENOUS
Status: DISCONTINUED | OUTPATIENT
Start: 2025-06-20 | End: 2025-06-23 | Stop reason: HOSPADM

## 2025-06-20 RX ORDER — MORPHINE SULFATE 2 MG/ML
1 INJECTION, SOLUTION INTRAMUSCULAR; INTRAVENOUS EVERY 4 HOURS PRN
Status: DISCONTINUED | OUTPATIENT
Start: 2025-06-20 | End: 2025-06-20

## 2025-06-20 RX ORDER — AMOXICILLIN 250 MG
2 CAPSULE ORAL 2 TIMES DAILY
Status: DISCONTINUED | OUTPATIENT
Start: 2025-06-20 | End: 2025-06-23 | Stop reason: HOSPADM

## 2025-06-20 RX ORDER — PANTOPRAZOLE SODIUM 40 MG/10ML
40 INJECTION, POWDER, LYOPHILIZED, FOR SOLUTION INTRAVENOUS
Status: DISCONTINUED | OUTPATIENT
Start: 2025-06-21 | End: 2025-06-23 | Stop reason: HOSPADM

## 2025-06-20 RX ORDER — ONDANSETRON 2 MG/ML
4 INJECTION INTRAMUSCULAR; INTRAVENOUS ONCE
Status: COMPLETED | OUTPATIENT
Start: 2025-06-20 | End: 2025-06-20

## 2025-06-20 RX ORDER — NICOTINE 21 MG/24HR
1 PATCH, TRANSDERMAL 24 HOURS TRANSDERMAL EVERY 24 HOURS
Status: DISCONTINUED | OUTPATIENT
Start: 2025-06-20 | End: 2025-06-23 | Stop reason: HOSPADM

## 2025-06-20 RX ORDER — LORAZEPAM 2 MG/1
2 TABLET ORAL EVERY 6 HOURS
Status: COMPLETED | OUTPATIENT
Start: 2025-06-20 | End: 2025-06-21

## 2025-06-20 RX ORDER — LORAZEPAM 0.5 MG/1
1 TABLET ORAL EVERY 12 HOURS SCHEDULED
Status: COMPLETED | OUTPATIENT
Start: 2025-06-22 | End: 2025-06-23

## 2025-06-20 RX ORDER — DIAZEPAM 5 MG/1
15 TABLET ORAL
Status: DISCONTINUED | OUTPATIENT
Start: 2025-06-20 | End: 2025-06-23 | Stop reason: HOSPADM

## 2025-06-20 RX ORDER — ALBUTEROL SULFATE 90 UG/1
2 INHALANT RESPIRATORY (INHALATION) EVERY 4 HOURS PRN
COMMUNITY
Start: 2025-05-24

## 2025-06-20 RX ORDER — NALOXONE HCL 0.4 MG/ML
0.4 VIAL (ML) INJECTION
Status: DISCONTINUED | OUTPATIENT
Start: 2025-06-20 | End: 2025-06-23 | Stop reason: HOSPADM

## 2025-06-20 RX ADMIN — DIAZEPAM 10 MG: 5 INJECTION, SOLUTION INTRAMUSCULAR; INTRAVENOUS at 22:26

## 2025-06-20 RX ADMIN — ONDANSETRON 4 MG: 2 INJECTION INTRAMUSCULAR; INTRAVENOUS at 09:26

## 2025-06-20 RX ADMIN — Medication 10 ML: at 21:11

## 2025-06-20 RX ADMIN — SODIUM CHLORIDE 1000 ML: 9 INJECTION, SOLUTION INTRAVENOUS at 10:52

## 2025-06-20 RX ADMIN — MORPHINE SULFATE 1 MG: 2 INJECTION, SOLUTION INTRAMUSCULAR; INTRAVENOUS at 18:33

## 2025-06-20 RX ADMIN — MORPHINE SULFATE 1 MG: 2 INJECTION, SOLUTION INTRAMUSCULAR; INTRAVENOUS at 22:26

## 2025-06-20 RX ADMIN — IOPAMIDOL 100 ML: 755 INJECTION, SOLUTION INTRAVENOUS at 10:03

## 2025-06-20 RX ADMIN — SODIUM CHLORIDE 1000 MG: 9 INJECTION, SOLUTION INTRAVENOUS at 10:54

## 2025-06-20 RX ADMIN — Medication 10 ML: at 14:17

## 2025-06-20 RX ADMIN — MORPHINE SULFATE 4 MG: 4 INJECTION, SOLUTION INTRAMUSCULAR; INTRAVENOUS at 09:26

## 2025-06-20 RX ADMIN — MORPHINE SULFATE 1 MG: 2 INJECTION, SOLUTION INTRAMUSCULAR; INTRAVENOUS at 14:16

## 2025-06-20 RX ADMIN — LORAZEPAM 2 MG: 2 TABLET ORAL at 19:33

## 2025-06-20 RX ADMIN — FOLIC ACID 1 MG: 1 TABLET ORAL at 19:32

## 2025-06-20 RX ADMIN — THIAMINE HYDROCHLORIDE 200 MG: 100 INJECTION, SOLUTION INTRAMUSCULAR; INTRAVENOUS at 21:11

## 2025-06-20 RX ADMIN — DEXTROSE MONOHYDRATE AND SODIUM CHLORIDE 125 ML/HR: 5; .9 INJECTION, SOLUTION INTRAVENOUS at 18:34

## 2025-06-20 NOTE — ED PROVIDER NOTES
"SHARED VISIT ATTESTATION:    This visit was performed by myself and an APC.  I personally approved the management plan/medical decision making and take responsibility for the patient management.      SHARED VISIT NOTE:    Patient is 40 y.o. year old female that presents to the ED for evaluation of abdominal pain, burning in the epigastrium.  Recent UTI.     Physical Exam    ED Course:    /81   Pulse 63   Temp 97.8 °F (36.6 °C) (Oral)   Resp 20   Ht 172.7 cm (68\")   Wt 58.5 kg (128 lb 15.5 oz)   SpO2 100%   BMI 19.61 kg/m²       The following orders were placed and all results were independently analyzed by me:  Orders Placed This Encounter   Procedures    Urine Culture - Urine,    CT Abdomen Pelvis With Contrast    [place] Draw    Comprehensive Metabolic Panel    Lipase    Urinalysis With Microscopic If Indicated (No Culture) - Urine, Clean Catch    hCG, Quantitative, Pregnancy    CBC Auto Differential    Urinalysis, Microscopic Only - Urine, Clean Catch    NPO Diet NPO Type: Strict NPO    Undress & Gown    Inpatient Hospitalist Consult    Insert Peripheral IV    Inpatient Admission    CBC & Differential    Green Top (Gel)    Lavender Top    Gold Top - SST    Light Blue Top       Medications Given in the Emergency Department:  Medications   sodium chloride 0.9 % flush 10 mL (has no administration in time range)   cefTRIAXone (ROCEPHIN) 1,000 mg in sodium chloride 0.9 % 100 mL IVPB-VTB (1,000 mg Intravenous New Bag 6/20/25 1054)   morphine injection 4 mg (4 mg Intravenous Given 6/20/25 0926)   ondansetron (ZOFRAN) injection 4 mg (4 mg Intravenous Given 6/20/25 0926)   iopamidol (ISOVUE-370) 76 % injection 100 mL (100 mL Intravenous Given 6/20/25 1003)   sodium chloride 0.9 % bolus 1,000 mL (1,000 mL Intravenous New Bag 6/20/25 1052)        ED Course:         Labs:    Lab Results (last 24 hours)       Procedure Component Value Units Date/Time    POCT URINALYSIS DIPSTICK, AUTOMATED [[id number]]  (Abnormal) " Collected: 06/20/25 0810    Specimen: Urine Updated: 06/20/25 0854     Color, UA Brown     Appearance, Fluid Slightly Cloudy     Specific Gravity, UA 1.020     pH, UA 6     Leukocytes,  Allyson/ul     Nitrite, UA Negative     Total Protein, urine 30 mg/dL     Glucose, UA Normal     External Ketones, Urine 15 mg/dL     Urobilinogen, UA 4 mg/dL mg/dL      External Bilirubin, Urine 1 mg/dL     Blood, UA 50 Morgan/uL     QC Acceptable     Lot Number [id number]     Expiration Date 03/31/2026     Comment --    POCT HCG, URINE, BY VISUAL COLOR [[id number]] Collected: 06/20/25 0850    Specimen: Urine Updated: 06/20/25 0854     HCG, Urine, QL Negative     QC Yes     Lot Number [id number]     Expiration Date 10,142,026     Comment --    CBC & Differential [[id number]]  (Abnormal) Collected: 06/20/25 0916    Specimen: Blood Updated: 06/20/25 0924    Narrative:      The following orders were created for panel order CBC & Differential.  Procedure                               Abnormality         Status                     ---------                               -----------         ------                     CBC Auto Differential[[id number]]        Abnormal            Final result                 Please view results for these tests on the individual orders.    Comprehensive Metabolic Panel [[id number]]  (Abnormal) Collected: 06/20/25 0916    Specimen: Blood Updated: 06/20/25 0943     Glucose 123 mg/dL      BUN 6.0 mg/dL      Creatinine 0.90 mg/dL      Sodium 137 mmol/L      Potassium 3.9 mmol/L      Chloride 100 mmol/L      CO2 22.7 mmol/L      Calcium 9.9 mg/dL      Total Protein 7.5 g/dL      Albumin 4.6 g/dL      ALT (SGPT) 93 U/L      AST (SGOT) 152 U/L      Alkaline Phosphatase 179 U/L      Total Bilirubin 1.5 mg/dL      Globulin 2.9 gm/dL      A/G Ratio 1.6 g/dL      BUN/Creatinine Ratio 6.7     Anion Gap 14.3 mmol/L      eGFR 84.1 mL/min/1.73     Narrative:      GFR Categories in Chronic Kidney Disease (CKD)              GFR  Category          GFR (mL/min/1.73)    Interpretation  G1                    90 or greater        Normal or high (1)  G2                    60-89                Mild decrease (1)  G3a                   45-59                Mild to moderate decrease  G3b                   30-44                Moderate to severe decrease  G4                    15-29                Severe decrease  G5                    14 or less           Kidney failure    (1)In the absence of evidence of kidney disease, neither GFR category G1 or G2 fulfill the criteria for CKD.    eGFR calculation 2021 CKD-EPI creatinine equation, which does not include race as a factor    Lipase [[id number]]  (Abnormal) Collected: 06/20/25 0916    Specimen: Blood Updated: 06/20/25 0951     Lipase 676 U/L     hCG, Quantitative, Pregnancy [[id number]] Collected: 06/20/25 0916    Specimen: Blood Updated: 06/20/25 0940     HCG Quantitative <0.50 mIU/mL     Narrative:      HCG Ranges by Gestational Age    Females - non-pregnant premenopausal   </= 1mIU/mL HCG  Females - postmenopausal               </= 7mIU/mL HCG    3 Weeks       5.4   -      72 mIU/mL  4 Weeks      10.2   -     708 mIU/mL  5 Weeks       217   -   8,245 mIU/mL  6 Weeks       152   -  32,177 mIU/mL  7 Weeks     4,059   - 153,767 mIU/mL  8 Weeks    31,366   - 149,094 mIU/mL  9 Weeks    59,109   - 135,901 mIU/mL  10 Weeks   44,186   - 170,409 mIU/mL  12 Weeks   27,107   - 201,615 mIU/mL  14 Weeks   24,302   -  93,646 mIU/mL  15 Weeks   12,540   -  69,747 mIU/mL  16 Weeks    8,904   -  55,332 mIU/mL  17 Weeks    8,240   -  51,793 mIU/mL  18 Weeks    9,649   -  55,271 mIU/mL      CBC Auto Differential [[id number]]  (Abnormal) Collected: 06/20/25 0916    Specimen: Blood Updated: 06/20/25 0924     WBC 7.20 10*3/mm3      RBC 4.68 10*6/mm3      Hemoglobin 14.2 g/dL      Hematocrit 43.9 %      MCV 93.8 fL      MCH 30.3 pg      MCHC 32.3 g/dL      RDW 15.8 %      RDW-SD 53.9 fl      MPV 8.7 fL      Platelets 240  10*3/mm3      Neutrophil % 83.6 %      Lymphocyte % 9.2 %      Monocyte % 5.7 %      Eosinophil % 0.7 %      Basophil % 0.4 %      Immature Grans % 0.4 %      Neutrophils, Absolute 6.02 10*3/mm3      Lymphocytes, Absolute 0.66 10*3/mm3      Monocytes, Absolute 0.41 10*3/mm3      Eosinophils, Absolute 0.05 10*3/mm3      Basophils, Absolute 0.03 10*3/mm3      Immature Grans, Absolute 0.03 10*3/mm3      nRBC 0.0 /100 WBC     Urinalysis With Microscopic If Indicated (No Culture) - Urine, Clean Catch [[id number]]  (Abnormal) Collected: 06/20/25 0936    Specimen: Urine, Clean Catch Updated: 06/20/25 1007     Color, UA Yellow     Appearance, UA Slightly Cloudy     pH, UA 6.0     Specific Gravity, UA 1.025     Glucose, UA Negative     Ketones, UA 40 mg/dL (2+)     Bilirubin, UA Small (1+)     Blood, UA Small (1+)     Protein, UA 30 mg/dL (1+)     Leuk Esterase, UA Trace     Nitrite, UA Positive     Urobilinogen, UA 2.0 E.U./dL    Urinalysis, Microscopic Only - Urine, Clean Catch [[id number]]  (Abnormal) Collected: 06/20/25 0936    Specimen: Urine, Clean Catch Updated: 06/20/25 0955     RBC, UA 6-10 /HPF      WBC, UA 21-50 /HPF      Bacteria, UA 4+ /HPF      Squamous Epithelial Cells, UA 13-20 /HPF      Hyaline Casts, UA 0-2 /LPF      Methodology Automated Microscopy    Urine Culture - Urine, Urine, Clean Catch [[id number]] Collected: 06/20/25 0936    Specimen: Urine, Clean Catch Updated: 06/20/25 1016             Imaging:    CT Abdomen Pelvis With Contrast  Result Date: 6/20/2025  CT ABDOMEN PELVIS W CONTRAST Date of Exam: 6/20/2025 9:50 AM EDT Indication: abd pain. Comparison: 5/26/2024 and prior Technique: Axial CT images were obtained of the abdomen and pelvis after the uneventful intravenous administration of iodinated contrast. Reconstructed coronal and sagittal images were also obtained. Automated exposure control and iterative construction methods were used. FINDINGS: Abdomen/Pelvis: Lower Chest: 4 mm subpleural opacity  in the image #6 left lower lobe appears unchanged. Bases grossly clear. No free air noted below the diaphragm. Organs: Liver is enlarged and diffusely decreased in attenuation compatible with hepatic steatosis. Small amount of fluid is noted within the abdomen and pelvis. This appears to be centered around the pancreas which demonstrates some slightly heterogeneous areas of decreased enhancement. No well-defined abscess or dilation of the pancreatic duct is identified. The gallbladder appears grossly unremarkable in appearance. The kidneys, spleen and adrenal glands are grossly unremarkable The hepatic vasculature and portal venous system appear grossly unremarkable in appearance. There is no evidence of portal venous thrombus. The mesenteric vasculature demonstrates no obvious acute abnormality GI/Bowel: Stomach and the small bowel demonstrate no obstruction. Fluid surrounding the pancreas extends to the adjacent stomach and small bowel. There is no well-defined fluid collection. No obstruction noted. Ileocecal valve is grossly unremarkable in appearance. The appendix appears normal and extends superiorly. The colon demonstrates no definite acute abnormality Pelvis: Fluid is noted within the pelvis, nonspecific. The uterus and ovaries demonstrate no acute abnormality. Small cyst suspected on the left likely physiologic. Urinary bladder is incompletely distended and grossly unremarkable in appearance Peritoneum/Retroperitoneum: Aorta is normal in caliber. There is increased number of small lymph nodes within the retroperitoneum likely reactive Bones/Soft Tissues: No acute osseous abnormality     1.Findings compatible with acute pancreatitis. No well-defined abscess or pseudocyst noted at this time. Please correlate with history and amylase and lipase levels. 2.Hepatomegaly and diffuse hepatic steatosis. This is increased compared to the previous exam. Please correlate with liver function test. 3.Other findings as  above. Electronically Signed: Tha Anaya MD  6/20/2025 10:40 AM EDT  Workstation ID: [id number]      MDM:    Procedures    Labs were collected in the emergency department and all labs were reviewed and interpreted by me.  CT scan was performed in the emergency department and the CT scan radiology impression was interpreted by me.                     Chavez Owens MD  11:22 EDT  06/20/25         Chavez Owens MD  06/20/25 1123

## 2025-06-20 NOTE — Clinical Note
Level of Care: Med/Surg [1]   Diagnosis: Acute pancreatitis [577.0.ICD-9-CM]   Admitting Physician: KAYDEN MORALES [[id number]]   Attending Physician: DARRYN PIERCE [[id number]]   Certification: I Certify That Inpatient Hospital Services Are Medically Necessary For Greater Than 2 Midnights

## 2025-06-20 NOTE — LETTER
June 23, 2025     Patient: Aubree Krueger   YOB: 1985   Date of Visit: 6/20/2025       To Whom It May Concern:    It is my medical opinion that Aubree Krueger may return to work on 06/24/25.           Sincerely,            CC:

## 2025-06-20 NOTE — ED PROVIDER NOTES
Time: 10:01 AM EDT  Date of encounter:  6/20/2025  Independent Historian/Clinical History and Information was obtained by:   Patient    History is limited by: N/A    Chief Complaint: abdominal pain       History of Present Illness:  Patient is a 40 y.o. year old female who presents to the emergency department for evaluation of abdominal pain associated nausea/vomiting that began yesterday.  Patient states she drinks alcohol daily.      Patient Care Team  Primary Care Provider: Kelly Little APRN    Past Medical History:     Allergies   Allergen Reactions    Sulfa Antibiotics Anaphylaxis     Past Medical History:   Diagnosis Date    Anemia 2003    1st pregnancy    Bipolar disorder 2000    Depression     Hepatitis C     finished tx 2021    PONV (postoperative nausea and vomiting) 2004    Substance abuse 2007    Varicella      Past Surgical History:   Procedure Laterality Date    KNEE SURGERY Left     2 surgeries    WISDOM TOOTH EXTRACTION  2007     Family History   Problem Relation Age of Onset    Breast cancer Paternal Aunt     Breast cancer Paternal Aunt        Home Medications:  Prior to Admission medications    Medication Sig Start Date End Date Taking? Authorizing Provider   benzonatate (TESSALON) 200 MG capsule Take 1 capsule by mouth 3 (Three) Times a Day As Needed for Cough. 3/5/25   Ksenia Rojas PA-C   meclizine (ANTIVERT) 25 MG tablet Take 2 tablets by mouth 3 (Three) Times a Day As Needed for Dizziness. 3/15/25   Marco Antonio Weiss MD   methylPREDNISolone (MEDROL) 4 MG dose pack Take as directed on package instructions. 3/5/25   Ksenia Rojas PA-C        Social History:   Social History     Tobacco Use    Smoking status: Heavy Smoker     Current packs/day: 1.00     Average packs/day: 1 pack/day for 15.0 years (15.0 ttl pk-yrs)     Types: Cigarettes     Passive exposure: Never    Smokeless tobacco: Never    Tobacco comments:     Workin on quittting   Vaping Use    Vaping status: Former    Quit date:  "6/13/2022    Substances: Nicotine   Substance Use Topics    Alcohol use: No    Drug use: Not Currently     Types: Fentanyl, Heroin, Marijuana     Comment: Quit in 2020         Review of Systems:  Review of Systems   Constitutional:  Negative for chills and fever.   HENT:  Negative for congestion, rhinorrhea and sore throat.    Eyes:  Negative for pain and visual disturbance.   Respiratory:  Negative for apnea, cough, chest tightness and shortness of breath.    Cardiovascular:  Negative for chest pain and palpitations.   Gastrointestinal:  Positive for abdominal pain, nausea and vomiting. Negative for diarrhea.   Genitourinary:  Negative for difficulty urinating and dysuria.   Musculoskeletal:  Negative for joint swelling and myalgias.   Skin:  Negative for color change.   Neurological:  Negative for seizures and headaches.   Psychiatric/Behavioral: Negative.     All other systems reviewed and are negative.       Physical Exam:  /81   Pulse 63   Temp 97.8 °F (36.6 °C) (Oral)   Resp 20   Ht 172.7 cm (68\")   Wt 58.5 kg (128 lb 15.5 oz)   SpO2 100%   BMI 19.61 kg/m²     Physical Exam  Vitals and nursing note reviewed.   Constitutional:       General: She is not in acute distress.     Appearance: Normal appearance. She is not toxic-appearing.   HENT:      Head: Normocephalic and atraumatic.      Jaw: There is normal jaw occlusion.   Eyes:      General: Lids are normal.      Extraocular Movements: Extraocular movements intact.      Conjunctiva/sclera: Conjunctivae normal.      Pupils: Pupils are equal, round, and reactive to light.   Cardiovascular:      Rate and Rhythm: Normal rate and regular rhythm.      Pulses: Normal pulses.      Heart sounds: Normal heart sounds.   Pulmonary:      Effort: Pulmonary effort is normal. No respiratory distress.      Breath sounds: Normal breath sounds. No wheezing or rhonchi.   Abdominal:      General: Abdomen is flat.      Palpations: Abdomen is soft.      Tenderness: " There is abdominal tenderness in the epigastric area. There is no guarding or rebound.   Musculoskeletal:         General: Normal range of motion.      Cervical back: Normal range of motion and neck supple.      Right lower leg: No edema.      Left lower leg: No edema.   Skin:     General: Skin is warm and dry.   Neurological:      Mental Status: She is alert and oriented to person, place, and time. Mental status is at baseline.   Psychiatric:         Mood and Affect: Mood normal.                    Medical Decision Making:      Comorbidities that affect care:    Substance abuse, hepatitis C    External Notes reviewed:          The following orders were placed and all results were independently analyzed by me:  Orders Placed This Encounter   Procedures    Urine Culture - Urine,    CT Abdomen Pelvis With Contrast    [place] Draw    Comprehensive Metabolic Panel    Lipase    Urinalysis With Microscopic If Indicated (No Culture) - Urine, Clean Catch    hCG, Quantitative, Pregnancy    CBC Auto Differential    Urinalysis, Microscopic Only - Urine, Clean Catch    NPO Diet NPO Type: Strict NPO    Undress & Gown    Inpatient Hospitalist Consult    Insert Peripheral IV    Inpatient Admission    Initiate Observation Status    CBC & Differential    Green Top (Gel)    Lavender Top    Gold Top - SST    Light Blue Top       Medications Given in the Emergency Department:  Medications   sodium chloride 0.9 % flush 10 mL (has no administration in time range)   morphine injection 4 mg (4 mg Intravenous Given 6/20/25 0926)   ondansetron (ZOFRAN) injection 4 mg (4 mg Intravenous Given 6/20/25 0926)   iopamidol (ISOVUE-370) 76 % injection 100 mL (100 mL Intravenous Given 6/20/25 1003)   cefTRIAXone (ROCEPHIN) 1,000 mg in sodium chloride 0.9 % 100 mL IVPB-VTB (1,000 mg Intravenous New Bag 6/20/25 1054)   sodium chloride 0.9 % bolus 1,000 mL (1,000 mL Intravenous New Bag 6/20/25 1052)        ED Course:         Labs:    Lab Results (last  24 hours)       Procedure Component Value Units Date/Time    POCT URINALYSIS DIPSTICK, AUTOMATED [[id number]]  (Abnormal) Collected: 06/20/25 0810    Specimen: Urine Updated: 06/20/25 0854     Color, UA Brown     Appearance, Fluid Slightly Cloudy     Specific Gravity, UA 1.020     pH, UA 6     Leukocytes,  Allyson/ul     Nitrite, UA Negative     Total Protein, urine 30 mg/dL     Glucose, UA Normal     External Ketones, Urine 15 mg/dL     Urobilinogen, UA 4 mg/dL mg/dL      External Bilirubin, Urine 1 mg/dL     Blood, UA 50 Morgan/uL     QC Acceptable     Lot Number [id number]     Expiration Date 03/31/2026     Comment --    POCT HCG, URINE, BY VISUAL COLOR [[id number]] Collected: 06/20/25 0850    Specimen: Urine Updated: 06/20/25 0854     HCG, Urine, QL Negative     QC Yes     Lot Number [id number]     Expiration Date 10,142,026     Comment --    CBC & Differential [[id number]]  (Abnormal) Collected: 06/20/25 0916    Specimen: Blood Updated: 06/20/25 0924    Narrative:      The following orders were created for panel order CBC & Differential.  Procedure                               Abnormality         Status                     ---------                               -----------         ------                     CBC Auto Differential[[id number]]        Abnormal            Final result                 Please view results for these tests on the individual orders.    Comprehensive Metabolic Panel [[id number]]  (Abnormal) Collected: 06/20/25 0916    Specimen: Blood Updated: 06/20/25 0943     Glucose 123 mg/dL      BUN 6.0 mg/dL      Creatinine 0.90 mg/dL      Sodium 137 mmol/L      Potassium 3.9 mmol/L      Chloride 100 mmol/L      CO2 22.7 mmol/L      Calcium 9.9 mg/dL      Total Protein 7.5 g/dL      Albumin 4.6 g/dL      ALT (SGPT) 93 U/L      AST (SGOT) 152 U/L      Alkaline Phosphatase 179 U/L      Total Bilirubin 1.5 mg/dL      Globulin 2.9 gm/dL      A/G Ratio 1.6 g/dL      BUN/Creatinine Ratio 6.7     Anion Gap 14.3  mmol/L      eGFR 84.1 mL/min/1.73     Narrative:      GFR Categories in Chronic Kidney Disease (CKD)              GFR Category          GFR (mL/min/1.73)    Interpretation  G1                    90 or greater        Normal or high (1)  G2                    60-89                Mild decrease (1)  G3a                   45-59                Mild to moderate decrease  G3b                   30-44                Moderate to severe decrease  G4                    15-29                Severe decrease  G5                    14 or less           Kidney failure    (1)In the absence of evidence of kidney disease, neither GFR category G1 or G2 fulfill the criteria for CKD.    eGFR calculation 2021 CKD-EPI creatinine equation, which does not include race as a factor    Lipase [[id number]]  (Abnormal) Collected: 06/20/25 0916    Specimen: Blood Updated: 06/20/25 0951     Lipase 676 U/L     hCG, Quantitative, Pregnancy [[id number]] Collected: 06/20/25 0916    Specimen: Blood Updated: 06/20/25 0940     HCG Quantitative <0.50 mIU/mL     Narrative:      HCG Ranges by Gestational Age    Females - non-pregnant premenopausal   </= 1mIU/mL HCG  Females - postmenopausal               </= 7mIU/mL HCG    3 Weeks       5.4   -      72 mIU/mL  4 Weeks      10.2   -     708 mIU/mL  5 Weeks       217   -   8,245 mIU/mL  6 Weeks       152   -  32,177 mIU/mL  7 Weeks     4,059   - 153,767 mIU/mL  8 Weeks    31,366   - 149,094 mIU/mL  9 Weeks    59,109   - 135,901 mIU/mL  10 Weeks   44,186   - 170,409 mIU/mL  12 Weeks   27,107   - 201,615 mIU/mL  14 Weeks   24,302   -  93,646 mIU/mL  15 Weeks   12,540   -  69,747 mIU/mL  16 Weeks    8,904   -  55,332 mIU/mL  17 Weeks    8,240   -  51,793 mIU/mL  18 Weeks    9,649   -  55,271 mIU/mL      CBC Auto Differential [[id number]]  (Abnormal) Collected: 06/20/25 0916    Specimen: Blood Updated: 06/20/25 0924     WBC 7.20 10*3/mm3      RBC 4.68 10*6/mm3      Hemoglobin 14.2 g/dL      Hematocrit 43.9 %      MCV  93.8 fL      MCH 30.3 pg      MCHC 32.3 g/dL      RDW 15.8 %      RDW-SD 53.9 fl      MPV 8.7 fL      Platelets 240 10*3/mm3      Neutrophil % 83.6 %      Lymphocyte % 9.2 %      Monocyte % 5.7 %      Eosinophil % 0.7 %      Basophil % 0.4 %      Immature Grans % 0.4 %      Neutrophils, Absolute 6.02 10*3/mm3      Lymphocytes, Absolute 0.66 10*3/mm3      Monocytes, Absolute 0.41 10*3/mm3      Eosinophils, Absolute 0.05 10*3/mm3      Basophils, Absolute 0.03 10*3/mm3      Immature Grans, Absolute 0.03 10*3/mm3      nRBC 0.0 /100 WBC     Urinalysis With Microscopic If Indicated (No Culture) - Urine, Clean Catch [[id number]]  (Abnormal) Collected: 06/20/25 0936    Specimen: Urine, Clean Catch Updated: 06/20/25 1007     Color, UA Yellow     Appearance, UA Slightly Cloudy     pH, UA 6.0     Specific Gravity, UA 1.025     Glucose, UA Negative     Ketones, UA 40 mg/dL (2+)     Bilirubin, UA Small (1+)     Blood, UA Small (1+)     Protein, UA 30 mg/dL (1+)     Leuk Esterase, UA Trace     Nitrite, UA Positive     Urobilinogen, UA 2.0 E.U./dL    Urinalysis, Microscopic Only - Urine, Clean Catch [[id number]]  (Abnormal) Collected: 06/20/25 0936    Specimen: Urine, Clean Catch Updated: 06/20/25 0955     RBC, UA 6-10 /HPF      WBC, UA 21-50 /HPF      Bacteria, UA 4+ /HPF      Squamous Epithelial Cells, UA 13-20 /HPF      Hyaline Casts, UA 0-2 /LPF      Methodology Automated Microscopy    Urine Culture - Urine, Urine, Clean Catch [[id number]] Collected: 06/20/25 0936    Specimen: Urine, Clean Catch Updated: 06/20/25 1016             Imaging:    CT Abdomen Pelvis With Contrast  Result Date: 6/20/2025  CT ABDOMEN PELVIS W CONTRAST Date of Exam: 6/20/2025 9:50 AM EDT Indication: abd pain. Comparison: 5/26/2024 and prior Technique: Axial CT images were obtained of the abdomen and pelvis after the uneventful intravenous administration of iodinated contrast. Reconstructed coronal and sagittal images were also obtained. Automated exposure  control and iterative construction methods were used. FINDINGS: Abdomen/Pelvis: Lower Chest: 4 mm subpleural opacity in the image #6 left lower lobe appears unchanged. Bases grossly clear. No free air noted below the diaphragm. Organs: Liver is enlarged and diffusely decreased in attenuation compatible with hepatic steatosis. Small amount of fluid is noted within the abdomen and pelvis. This appears to be centered around the pancreas which demonstrates some slightly heterogeneous areas of decreased enhancement. No well-defined abscess or dilation of the pancreatic duct is identified. The gallbladder appears grossly unremarkable in appearance. The kidneys, spleen and adrenal glands are grossly unremarkable The hepatic vasculature and portal venous system appear grossly unremarkable in appearance. There is no evidence of portal venous thrombus. The mesenteric vasculature demonstrates no obvious acute abnormality GI/Bowel: Stomach and the small bowel demonstrate no obstruction. Fluid surrounding the pancreas extends to the adjacent stomach and small bowel. There is no well-defined fluid collection. No obstruction noted. Ileocecal valve is grossly unremarkable in appearance. The appendix appears normal and extends superiorly. The colon demonstrates no definite acute abnormality Pelvis: Fluid is noted within the pelvis, nonspecific. The uterus and ovaries demonstrate no acute abnormality. Small cyst suspected on the left likely physiologic. Urinary bladder is incompletely distended and grossly unremarkable in appearance Peritoneum/Retroperitoneum: Aorta is normal in caliber. There is increased number of small lymph nodes within the retroperitoneum likely reactive Bones/Soft Tissues: No acute osseous abnormality     1.Findings compatible with acute pancreatitis. No well-defined abscess or pseudocyst noted at this time. Please correlate with history and amylase and lipase levels. 2.Hepatomegaly and diffuse hepatic  steatosis. This is increased compared to the previous exam. Please correlate with liver function test. 3.Other findings as above. Electronically Signed: Tha Anaya MD  6/20/2025 10:40 AM EDT  Workstation ID: [id number]        Differential Diagnosis and Discussion:    Abdominal Pain: Based on the patient's signs and symptoms, I considered abdominal aortic aneurysm, small bowel obstruction, pancreatitis, acute cholecystitis, acute appendecitis, peptic ulcer disease, gastritis, colitis, endocrine disorders, irritable bowel syndrome and other differential diagnosis an etiology of the patient's abdominal pain.    PROCEDURES:    Labs were collected in the emergency department and all labs were reviewed and interpreted by me.  CT scan was performed in the emergency department and the CT scan radiology impression was interpreted by me.    No orders to display       Procedures    MDM     Amount and/or Complexity of Data Reviewed  Clinical lab tests: reviewed  Tests in the radiology section of CPT®: reviewed  Decide to obtain previous medical records or to obtain history from someone other than the patient: yes    Patient has acute pancreatitis.  I gave patient a liter of fluids and will admit.  Pain under control at this time.  I spoke with Dr. Dennis who agrees to admit patient.                  Patient Care Considerations:          Consultants/Shared Management Plan:    I spoke with Dr. Dennis who agrees to admit patient.    Social Determinants of Health:          Disposition and Care Coordination:    Admit:   Through independent evaluation of the patient's history, physical, and imperical data, the patient meets criteria for inpatient admission to the hospital.        Final diagnoses:   Alcohol-induced acute pancreatitis, unspecified complication status        ED Disposition       ED Disposition   Decision to Admit    Condition   --    Comment   Level of Care: Med/Surg [1]   Diagnosis: Acute pancreatitis  [577.0.ICD-9-CM]   Admitting Physician: KAYDEN MORALES [[id number]]   Attending Physician: KAYDEN MORALES [[id number]]                 This medical record created using voice recognition software.             Jayce Joel PA-C  06/20/25 1148

## 2025-06-20 NOTE — CASE MANAGEMENT/SOCIAL WORK
Discharge Planning Assessment  [facility]     Patient Name: Aubree Krueger  MRN: [id number]  Today's Date: 6/20/2025    Admit Date: 6/20/2025        Discharge Needs Assessment       Row Name 06/20/25 1124       Living Environment    People in Home child(faisal), dependent    Current Living Arrangements home    Potentially Unsafe Housing Conditions none    In the past 12 months has the electric, gas, oil, or water company threatened to shut off services in your home? No    Primary Care Provided by self    Provides Primary Care For child(faisal)    Family Caregiver if Needed none    Quality of Family Relationships involved;supportive    Able to Return to Prior Arrangements yes       Resource/Environmental Concerns    Resource/Environmental Concerns none    Transportation Concerns none       Transportation Needs    In the past 12 months, has lack of transportation kept you from medical appointments or from getting medications? no    In the past 12 months, has lack of transportation kept you from meetings, work, or from getting things needed for daily living? No       Food Insecurity    Within the past 12 months, you worried that your food would run out before you got the money to buy more. Never true    Within the past 12 months, the food you bought just didn't last and you didn't have money to get more. Never true       Transition Planning    Patient/Family Anticipates Transition to home    Patient/Family Anticipated Services at Transition none    Transportation Anticipated family or friend will provide;car, drives self       Discharge Needs Assessment    Readmission Within the Last 30 Days no previous admission in last 30 days    Equipment Currently Used at Home none    Concerns to be Addressed no discharge needs identified    Do you want help finding or keeping work or a job? I do not need or want help    Do you want help with school or training? For example, starting or completing job training or getting a high school  diploma, GED or equivalent No    Anticipated Changes Related to Illness none    Equipment Needed After Discharge none                No needs identified   FLORENTINO Aldana

## 2025-06-20 NOTE — PLAN OF CARE
Goal Outcome Evaluation:  Plan of Care Reviewed With: patient        Progress: no change  Outcome Evaluation: Pt is AxOx4 on room air. Admitted this shift from er. Pt refused skin assessment, stating her skin was fine. Pt was given prn pain medication for abdominal pain. Pt has been resting and has no needs or concerns at this time.

## 2025-06-20 NOTE — H&P
[facility]   HOSPITALIST HISTORY AND PHYSICAL  Date: 2025   Patient Name: Aubree Krueger  : 1985  MRN: [id number]  Primary Care Physician:  Kelly Little APRN  Date of admission: 2025    Subjective   Subjective     Chief Complaint: Nausea, vomiting and abdominal pain    HPI:    Aubree Krueger is a 40 y.o. female smoker with past medical history notable for history of chronic hepatitis C which she says was successfully treated 4 years ago, bipolar disorder, nicotine addiction and ongoing cigarette smoking, history of substance abuse including marijuana, heroin and fentanyl which she says has been resolved for several years and ongoing history of alcohol abuse.  She denies any history of alcohol withdrawal seizures or significant withdrawal symptoms and she also denies any significant history of GI bleeding related to her drinking.  She is unaware of whether or not she might have cirrhosis and does not follow-up with a hepatologist.  She reports that she has been ill pretty continuously since December with respiratory symptoms that she has passed back-and-forth between herself and one of her daughters but that seems to be improving.  She presented to the emergency department with concerns about abdominal pain with nausea and vomiting and on emergency department evaluation is found to have CT scan evidence of pancreatitis and an elevated lipase of 676, therefore the hospitalist service was asked to admit her for the treatment of acute pancreatitis.  She was incidentally noted to have a urinary tract infection and antibiotic therapy was started for that as well.      Personal History     Past Medical History:  Past Medical History:   Diagnosis Date    Anemia     1st pregnancy    Bipolar disorder     Depression     Hepatitis C     finished tx     PONV (postoperative nausea and vomiting) 2004    Substance abuse 2007    Varicella        Past Surgical History:  Past Surgical History:    Procedure Laterality Date    KNEE SURGERY Left     2 surgeries    WISDOM TOOTH EXTRACTION  2007       Family History:   Family History   Problem Relation Age of Onset    Breast cancer Paternal Aunt     Breast cancer Paternal Aunt        Social History:   Social History     Socioeconomic History    Marital status: Single    Years of education: bachelors degree   Tobacco Use    Smoking status: Heavy Smoker     Current packs/day: 0.50     Average packs/day: 0.7 packs/day for 35.5 years (25.2 ttl pk-yrs)     Types: Cigarettes     Start date: 2005     Passive exposure: Never    Smokeless tobacco: Never    Tobacco comments:     Workin on quittting   Vaping Use    Vaping status: Former    Quit date: 6/13/2022    Substances: Nicotine   Substance and Sexual Activity    Alcohol use: No    Drug use: Not Currently     Types: Fentanyl, Heroin, Marijuana     Comment: Quit in 2020    Sexual activity: Yes     Partners: Male     Birth control/protection: None       Home Medications:  albuterol sulfate HFA    Allergies:  Allergies   Allergen Reactions    Sulfa Antibiotics Anaphylaxis       Review of Systems   All systems were reviewed and negative except for: She continues to complain of ongoing abdominal discomfort and nausea but has not vomited since arrival in her hospital room although she did prior to arrival.  She also specifically denies rectal bleeding melena or hematemesis.  She does continue to report that she has some intermittent coughing but minimal sputum production.  She has been using her albuterol inhaler to some extent but no more than typical for her in the past 6 months.  Objective   Objective     Vitals:   Temp:  [97.6 °F (36.4 °C)-97.8 °F (36.6 °C)] 97.6 °F (36.4 °C)  Heart Rate:  [44-68] 51  Resp:  [18-20] 18  BP: (112-148)/(78-87) 136/86    Physical Exam    Constitutional: Awake, alert, no acute distress   Eyes: Pupils equal, sclerae anicteric, no conjunctival injection   HENT: NCAT, mucous membranes  moist   Neck: Supple, no thyromegaly, no lymphadenopathy, trachea midline   Respiratory: Clear to auscultation bilaterally, nonlabored respirations    Cardiovascular: RRR, no murmurs, rubs, or gallops, palpable pedal pulses bilaterally   Gastrointestinal: Positive bowel sounds, soft, nontender, nondistended   Musculoskeletal: No bilateral ankle edema, no clubbing or cyanosis to extremities   Psychiatric: Appropriate affect, cooperative   Neurologic: Oriented x 3, strength symmetric in all extremities, Cranial Nerves grossly intact to confrontation, speech clear   Skin: No rashes     Result Review    Result Review:  I have personally reviewed the results from the time of this admission to 6/20/2025 15:09 EDT and agree with these findings:  [x]  Laboratory  []  Microbiology  [x]  Radiology  []  EKG/Telemetry   []  Cardiology/Vascular   []  Pathology  [x]  Old records  []  Other:      Assessment & Plan   Assessment / Plan     Assessment/Plan:   Acute alcoholic pancreatitis  N.p.o. except ice chips, IV fluids, parenteral pain medication and antiemetics, GI prophylaxis  with PPI, repeat lipase in the morning, consider GI consultation      Simple cystitis/UTI  Continue Rocephin started empiric [place] in the emergency department, urine culture pending    Alcoholic hepatitis  Continue to reinforce education about the risk of cirrhosis and future liver cancer given her history of hepatitis C, monitor liver function tests    History of hepatitis C which has been treated  As above, reinforced education regarding risks of liver disease with ongoing drinking on this history    Ongoing alcohol dependence; PAWSS score 2; average risk of withdrawal  Placed on CIWA protocol, strongly encouraged outpatient treatment and complete alcohol cessation    Nicotine dependence/smoking addiction  Nicotine replacement ordered, complete cessation encouraged    Chronic bronchitis  -Albuterol nebulizer treatments as needed      VTE  Prophylaxis:  Pharmacologic & mechanical VTE prophylaxis orders are present.  Lovenox and SCDs ordered      CODE STATUS:    Code Status (Patient has no pulse and is not breathing): CPR (Attempt to Resuscitate)  Medical Interventions (Patient has pulse or is breathing): Full Support  Level Of Support Discussed With: Patient      Admission Status:  I believe this patient meets inpatient  status.  She will  require supportive care with IV fluids while she is n.p.o. as well as parenteral pain medication and antiemetics, and serial lab work to monitor her condition.  It is anticipated that her condition will not improve sufficiently for discharge for more than 48 hours (or 2 midnights)    Electronically signed by Abner Dennis MD, 06/20/25, 3:09 PM EDT.

## 2025-06-21 LAB
ALBUMIN SERPL-MCNC: 3.8 G/DL (ref 3.5–5.2)
ALBUMIN/GLOB SERPL: 1.5 G/DL
ALP SERPL-CCNC: 129 U/L (ref 39–117)
ALT SERPL W P-5'-P-CCNC: 59 U/L (ref 1–33)
ANION GAP SERPL CALCULATED.3IONS-SCNC: 9.4 MMOL/L (ref 5–15)
AST SERPL-CCNC: 59 U/L (ref 1–32)
BACTERIA SPEC AEROBE CULT: NORMAL
BASOPHILS # BLD AUTO: 0.03 10*3/MM3 (ref 0–0.2)
BASOPHILS NFR BLD AUTO: 0.3 % (ref 0–1.5)
BILIRUB SERPL-MCNC: 0.7 MG/DL (ref 0–1.2)
BUN SERPL-MCNC: 7 MG/DL (ref 6–20)
BUN/CREAT SERPL: 9.2 (ref 7–25)
CALCIUM SPEC-SCNC: 9.2 MG/DL (ref 8.6–10.5)
CHLORIDE SERPL-SCNC: 106 MMOL/L (ref 98–107)
CO2 SERPL-SCNC: 22.6 MMOL/L (ref 22–29)
CREAT SERPL-MCNC: 0.76 MG/DL (ref 0.57–1)
DEPRECATED RDW RBC AUTO: 52.7 FL (ref 37–54)
EGFRCR SERPLBLD CKD-EPI 2021: 103 ML/MIN/1.73
EOSINOPHIL # BLD AUTO: 0.02 10*3/MM3 (ref 0–0.4)
EOSINOPHIL NFR BLD AUTO: 0.2 % (ref 0.3–6.2)
ERYTHROCYTE [DISTWIDTH] IN BLOOD BY AUTOMATED COUNT: 15.8 % (ref 12.3–15.4)
GLOBULIN UR ELPH-MCNC: 2.5 GM/DL
GLUCOSE SERPL-MCNC: 155 MG/DL (ref 65–99)
HCT VFR BLD AUTO: 37.4 % (ref 34–46.6)
HGB BLD-MCNC: 12.2 G/DL (ref 12–15.9)
IMM GRANULOCYTES # BLD AUTO: 0.05 10*3/MM3 (ref 0–0.05)
IMM GRANULOCYTES NFR BLD AUTO: 0.5 % (ref 0–0.5)
LIPASE SERPL-CCNC: 1006 U/L (ref 13–60)
LYMPHOCYTES # BLD AUTO: 0.64 10*3/MM3 (ref 0.7–3.1)
LYMPHOCYTES NFR BLD AUTO: 6.2 % (ref 19.6–45.3)
MCH RBC QN AUTO: 30.2 PG (ref 26.6–33)
MCHC RBC AUTO-ENTMCNC: 32.6 G/DL (ref 31.5–35.7)
MCV RBC AUTO: 92.6 FL (ref 79–97)
MONOCYTES # BLD AUTO: 0.47 10*3/MM3 (ref 0.1–0.9)
MONOCYTES NFR BLD AUTO: 4.5 % (ref 5–12)
NEUTROPHILS NFR BLD AUTO: 88.3 % (ref 42.7–76)
NEUTROPHILS NFR BLD AUTO: 9.17 10*3/MM3 (ref 1.7–7)
NRBC BLD AUTO-RTO: 0 /100 WBC (ref 0–0.2)
PLATELET # BLD AUTO: 200 10*3/MM3 (ref 140–450)
PMV BLD AUTO: 9.4 FL (ref 6–12)
POTASSIUM SERPL-SCNC: 3.8 MMOL/L (ref 3.5–5.2)
PROT SERPL-MCNC: 6.3 G/DL (ref 6–8.5)
RBC # BLD AUTO: 4.04 10*6/MM3 (ref 3.77–5.28)
SODIUM SERPL-SCNC: 138 MMOL/L (ref 136–145)
WBC NRBC COR # BLD AUTO: 10.38 10*3/MM3 (ref 3.4–10.8)

## 2025-06-21 PROCEDURE — 25010000002 THIAMINE HCL 200 MG/2ML SOLUTION: Performed by: FAMILY MEDICINE

## 2025-06-21 PROCEDURE — 83690 ASSAY OF LIPASE: CPT | Performed by: FAMILY MEDICINE

## 2025-06-21 PROCEDURE — 25010000002 ONDANSETRON PER 1 MG: Performed by: FAMILY MEDICINE

## 2025-06-21 PROCEDURE — 25010000002 CEFTRIAXONE PER 250 MG: Performed by: FAMILY MEDICINE

## 2025-06-21 PROCEDURE — 25010000002 ENOXAPARIN PER 10 MG: Performed by: FAMILY MEDICINE

## 2025-06-21 PROCEDURE — 80053 COMPREHEN METABOLIC PANEL: CPT | Performed by: FAMILY MEDICINE

## 2025-06-21 PROCEDURE — 25010000002 MORPHINE PER 10 MG

## 2025-06-21 PROCEDURE — 85025 COMPLETE CBC W/AUTO DIFF WBC: CPT | Performed by: FAMILY MEDICINE

## 2025-06-21 PROCEDURE — 25010000002 DIAZEPAM PER 5 MG: Performed by: FAMILY MEDICINE

## 2025-06-21 PROCEDURE — 25810000003 DEXTROSE 5 % AND SODIUM CHLORIDE 0.9 % 5-0.9 % SOLUTION: Performed by: STUDENT IN AN ORGANIZED HEALTH CARE EDUCATION/TRAINING PROGRAM

## 2025-06-21 PROCEDURE — 25010000002 THIAMINE PER 100 MG: Performed by: FAMILY MEDICINE

## 2025-06-21 PROCEDURE — 99232 SBSQ HOSP IP/OBS MODERATE 35: CPT | Performed by: STUDENT IN AN ORGANIZED HEALTH CARE EDUCATION/TRAINING PROGRAM

## 2025-06-21 RX ADMIN — DEXTROSE MONOHYDRATE AND SODIUM CHLORIDE 150 ML/HR: 5; .9 INJECTION, SOLUTION INTRAVENOUS at 12:44

## 2025-06-21 RX ADMIN — MORPHINE SULFATE 1 MG: 2 INJECTION, SOLUTION INTRAMUSCULAR; INTRAVENOUS at 19:52

## 2025-06-21 RX ADMIN — PANTOPRAZOLE SODIUM 40 MG: 40 INJECTION, POWDER, FOR SOLUTION INTRAVENOUS at 05:20

## 2025-06-21 RX ADMIN — LORAZEPAM 2 MG: 2 TABLET ORAL at 01:32

## 2025-06-21 RX ADMIN — ENOXAPARIN SODIUM 40 MG: 40 INJECTION SUBCUTANEOUS at 09:28

## 2025-06-21 RX ADMIN — LORAZEPAM 2 MG: 2 TABLET ORAL at 07:45

## 2025-06-21 RX ADMIN — DEXTROSE MONOHYDRATE AND SODIUM CHLORIDE 150 ML/HR: 5; .9 INJECTION, SOLUTION INTRAVENOUS at 19:54

## 2025-06-21 RX ADMIN — FOLIC ACID 1 MG: 1 TABLET ORAL at 09:28

## 2025-06-21 RX ADMIN — MORPHINE SULFATE 1 MG: 2 INJECTION, SOLUTION INTRAMUSCULAR; INTRAVENOUS at 23:00

## 2025-06-21 RX ADMIN — CEFTRIAXONE SODIUM 1000 MG: 1 INJECTION, POWDER, FOR SOLUTION INTRAMUSCULAR; INTRAVENOUS at 12:54

## 2025-06-21 RX ADMIN — MORPHINE SULFATE 1 MG: 2 INJECTION, SOLUTION INTRAMUSCULAR; INTRAVENOUS at 12:54

## 2025-06-21 RX ADMIN — DOCUSATE SODIUM 50MG AND SENNOSIDES 8.6MG 2 TABLET: 8.6; 5 TABLET, FILM COATED ORAL at 09:28

## 2025-06-21 RX ADMIN — MORPHINE SULFATE 1 MG: 2 INJECTION, SOLUTION INTRAMUSCULAR; INTRAVENOUS at 01:31

## 2025-06-21 RX ADMIN — DIAZEPAM 10 MG: 5 INJECTION, SOLUTION INTRAMUSCULAR; INTRAVENOUS at 19:52

## 2025-06-21 RX ADMIN — MORPHINE SULFATE 1 MG: 2 INJECTION, SOLUTION INTRAMUSCULAR; INTRAVENOUS at 15:57

## 2025-06-21 RX ADMIN — Medication 10 ML: at 09:28

## 2025-06-21 RX ADMIN — ONDANSETRON 4 MG: 2 INJECTION INTRAMUSCULAR; INTRAVENOUS at 13:08

## 2025-06-21 RX ADMIN — DIAZEPAM 10 MG: 5 INJECTION, SOLUTION INTRAMUSCULAR; INTRAVENOUS at 23:00

## 2025-06-21 RX ADMIN — MORPHINE SULFATE 1 MG: 2 INJECTION, SOLUTION INTRAMUSCULAR; INTRAVENOUS at 09:28

## 2025-06-21 RX ADMIN — THIAMINE HYDROCHLORIDE 200 MG: 100 INJECTION, SOLUTION INTRAMUSCULAR; INTRAVENOUS at 23:00

## 2025-06-21 RX ADMIN — THIAMINE HYDROCHLORIDE 200 MG: 100 INJECTION, SOLUTION INTRAMUSCULAR; INTRAVENOUS at 13:09

## 2025-06-21 RX ADMIN — DIAZEPAM 10 MG: 5 INJECTION, SOLUTION INTRAMUSCULAR; INTRAVENOUS at 05:20

## 2025-06-21 RX ADMIN — MORPHINE SULFATE 1 MG: 2 INJECTION, SOLUTION INTRAMUSCULAR; INTRAVENOUS at 05:20

## 2025-06-21 RX ADMIN — LORAZEPAM 2 MG: 2 TABLET ORAL at 12:54

## 2025-06-21 RX ADMIN — THIAMINE HYDROCHLORIDE 200 MG: 100 INJECTION, SOLUTION INTRAMUSCULAR; INTRAVENOUS at 05:20

## 2025-06-21 NOTE — PLAN OF CARE
Goal Outcome Evaluation:  Plan of Care Reviewed With: patient        Progress: no change  Outcome Evaluation: Patient alert and oriented x4 on room air. Pain medication administered as ordered based on 10/10 pain reports. Patient had one nausea and vomiting episode this shift, PRN medication administered per MAR. ED francois as ordered. Seizure precautions in place. No new issues at this time.

## 2025-06-21 NOTE — PROGRESS NOTES
[facility]   Hospitalist Progress Note  Date: 2025  Patient Name: Aubree Krueger  : 1985  MRN: [id number]  Date of admission: 2025  Room[place]Bed: [facility] [place]      Subjective   Subjective     Chief Complaint: Nausea, vomiting, abdominal pain    Summary:  Aubree Krueger is a 40 y.o. female with history of chronic hepatitis C which she says was successfully treated 4 years ago, bipolar disorder, nicotine addiction and ongoing cigarette smoking, history of substance abuse including marijuana, heroin and fentanyl which she says has been resolved for several years and ongoing history of alcohol abuse who presented to hospital with nausea vomiting abdominal pain. CT scan evidence of pancreatitis and an elevated lipase of 676.  Admitted for pancreatitis and UTI    Interval Followup:     Patient remains with abdominal pain.  She does not feel like eating.  Increase the rate of fluids today    All systems reviewed and negative except for what is outlined above.      Objective   Objective     Vitals:   Temp:  [98.1 °F (36.7 °C)-99.1 °F (37.3 °C)] 99.1 °F (37.3 °C)  Heart Rate:  [51-76] 76  Resp:  [18] 18  BP: (101-135)/(70-88) 135/88    Physical Exam   General: NAD  Cardiovascular: RRR  Pulmonary: no conversational dyspnea  Neuro: speech clear; no tremor  Psych: Mood and affect appropriate    Result Review    Result Review:  I have personally reviewed these results:  [x]  Laboratory      Lab 25  0916   WBC 10.38 7.20   HEMOGLOBIN 12.2 14.2   HEMATOCRIT 37.4 43.9   PLATELETS 200 240   NEUTROS ABS 9.17* 6.02   IMMATURE GRANS (ABS) 0.05 0.03   LYMPHS ABS 0.64* 0.66*   MONOS ABS 0.47 0.41   EOS ABS 0.02 0.05   MCV 92.6 93.8         Lab 25  0916   SODIUM 138 137   POTASSIUM 3.8 3.9   CHLORIDE 106 100   CO2 22.6 22.7   ANION GAP 9.4 14.3   BUN 7.0 6.0   CREATININE 0.76 0.90   EGFR 103.0 84.1   GLUCOSE 155* 123*   CALCIUM 9.2 9.9         Lab 253 25  0916    TOTAL PROTEIN 6.3 7.5   ALBUMIN 3.8 4.6   GLOBULIN 2.5 2.9   ALT (SGPT) 59* 93*   AST (SGOT) 59* 152*   BILIRUBIN 0.7 1.5*   ALK PHOS 129* 179*   LIPASE 1,006* 676*                     Brief Urine Lab Results  (Last result in the past 365 days)        Color   Clarity   Blood   Leuk Est   Nitrite   Protein   CREAT   Urine HCG        06/20/25 0936 Yellow   Slightly Cloudy   Small (1+)   Trace   Positive   30 mg/dL (1+)                 [x]  Microbiology   Microbiology Results (last 10 days)       Procedure Component Value - Date/Time    Urine Culture - Urine, Urine, Clean Catch [[id number]] Collected: 06/20/25 0936    Lab Status: Final result Specimen: Urine, Clean Catch Updated: 06/21/25 1004     Urine Culture <10,000 CFU/mL Normal Urogenital Serenity    Narrative:      Colonization of the urinary tract without infection is common. Treatment is discouraged unless the patient is symptomatic, pregnant, or undergoing an invasive urologic procedure.          [x]  Radiology  CT Abdomen Pelvis With Contrast  Result Date: 6/20/2025  1.Findings compatible with acute pancreatitis. No well-defined abscess or pseudocyst noted at this time. Please correlate with history and amylase and lipase levels. 2.Hepatomegaly and diffuse hepatic steatosis. This is increased compared to the previous exam. Please correlate with liver function test. 3.Other findings as above. Electronically Signed: Tha Anaya MD  6/20/2025 10:40 AM EDT  Workstation ID: [id number]    []  EKG/Telemetry   []  Cardiology/Vascular   []  Pathology  []  Old records  []  Other:    Assessment & Plan        Assessment and Plan:    Acute alcoholic pancreatitis  N.p.o. except ice chips, IV fluids, parenteral pain medication and antiemetics, GI prophylaxis  with PPI        Simple cystitis/UTI  Continue Rocephin, urine culture with no growth  Clarify symptoms     Alcoholic hepatitis  Continue to reinforce education about the risk of cirrhosis and future liver cancer given  her history of hepatitis C, monitor liver function tests     History of hepatitis C which has been treated  As above, reinforced education regarding risks of liver disease with ongoing drinking on this history     Ongoing alcohol dependence  Placed on CIWA protocol, strongly encouraged outpatient treatment and complete alcohol cessation     Nicotine dependence/smoking addiction  Nicotine replacement ordered, complete cessation encouraged     Chronic bronchitis  Albuterol nebulizer treatments as needed          Discussed with RN.    VTE Prophylaxis:  Pharmacologic & mechanical VTE prophylaxis orders are present.        CODE STATUS:   Code Status (Patient has no pulse and is not breathing): CPR (Attempt to Resuscitate)  Medical Interventions (Patient has pulse or is breathing): Full Support  Level Of Support Discussed With: Patient      Electronically signed by Vijay Reyes MD, 6/21/2025, 15:59 EDT.

## 2025-06-21 NOTE — PLAN OF CARE
Goal Outcome Evaluation:  Plan of Care Reviewed With: patient        Progress: no change  Outcome Evaluation: A & O x 4, C/O pain, pain medication given as ordered PRN. CIWA assessments during shift, medication given as ordered, monitoring oxygen saturation throughout shift. Ad linda in room, frequent rounding, fall precautions and education on fall prevention provided, understanding verified. Appears to be sleeping in between care without any needs during this time.

## 2025-06-22 LAB
ANION GAP SERPL CALCULATED.3IONS-SCNC: 8.8 MMOL/L (ref 5–15)
BUN SERPL-MCNC: 2.4 MG/DL (ref 6–20)
BUN/CREAT SERPL: 4.4 (ref 7–25)
CALCIUM SPEC-SCNC: 8.7 MG/DL (ref 8.6–10.5)
CHLORIDE SERPL-SCNC: 105 MMOL/L (ref 98–107)
CO2 SERPL-SCNC: 21.2 MMOL/L (ref 22–29)
CREAT SERPL-MCNC: 0.54 MG/DL (ref 0.57–1)
DEPRECATED RDW RBC AUTO: 54.3 FL (ref 37–54)
EGFRCR SERPLBLD CKD-EPI 2021: 121 ML/MIN/1.73
ERYTHROCYTE [DISTWIDTH] IN BLOOD BY AUTOMATED COUNT: 15.9 % (ref 12.3–15.4)
GLUCOSE SERPL-MCNC: 113 MG/DL (ref 65–99)
HCT VFR BLD AUTO: 36.9 % (ref 34–46.6)
HGB BLD-MCNC: 11.8 G/DL (ref 12–15.9)
MCH RBC QN AUTO: 29.9 PG (ref 26.6–33)
MCHC RBC AUTO-ENTMCNC: 32 G/DL (ref 31.5–35.7)
MCV RBC AUTO: 93.4 FL (ref 79–97)
PLATELET # BLD AUTO: 157 10*3/MM3 (ref 140–450)
PMV BLD AUTO: 9.6 FL (ref 6–12)
POTASSIUM SERPL-SCNC: 3.3 MMOL/L (ref 3.5–5.2)
RBC # BLD AUTO: 3.95 10*6/MM3 (ref 3.77–5.28)
SODIUM SERPL-SCNC: 135 MMOL/L (ref 136–145)
WBC NRBC COR # BLD AUTO: 11.25 10*3/MM3 (ref 3.4–10.8)

## 2025-06-22 PROCEDURE — 25010000002 THIAMINE PER 100 MG: Performed by: FAMILY MEDICINE

## 2025-06-22 PROCEDURE — 25010000002 MORPHINE PER 10 MG

## 2025-06-22 PROCEDURE — 99232 SBSQ HOSP IP/OBS MODERATE 35: CPT | Performed by: STUDENT IN AN ORGANIZED HEALTH CARE EDUCATION/TRAINING PROGRAM

## 2025-06-22 PROCEDURE — 85027 COMPLETE CBC AUTOMATED: CPT | Performed by: STUDENT IN AN ORGANIZED HEALTH CARE EDUCATION/TRAINING PROGRAM

## 2025-06-22 PROCEDURE — 80048 BASIC METABOLIC PNL TOTAL CA: CPT | Performed by: STUDENT IN AN ORGANIZED HEALTH CARE EDUCATION/TRAINING PROGRAM

## 2025-06-22 PROCEDURE — 25010000002 ENOXAPARIN PER 10 MG: Performed by: FAMILY MEDICINE

## 2025-06-22 PROCEDURE — 25010000002 POTASSIUM CHLORIDE 10 MEQ/100ML SOLUTION: Performed by: STUDENT IN AN ORGANIZED HEALTH CARE EDUCATION/TRAINING PROGRAM

## 2025-06-22 PROCEDURE — 25010000002 CEFTRIAXONE PER 250 MG: Performed by: FAMILY MEDICINE

## 2025-06-22 RX ORDER — DEXTROSE MONOHYDRATE AND SODIUM CHLORIDE 5; .9 G/100ML; G/100ML
150 INJECTION, SOLUTION INTRAVENOUS CONTINUOUS
Status: DISCONTINUED | OUTPATIENT
Start: 2025-06-22 | End: 2025-06-23

## 2025-06-22 RX ORDER — POTASSIUM CHLORIDE 750 MG/1
20 CAPSULE, EXTENDED RELEASE ORAL ONCE
Status: COMPLETED | OUTPATIENT
Start: 2025-06-22 | End: 2025-06-22

## 2025-06-22 RX ORDER — POTASSIUM CHLORIDE 7.45 MG/ML
10 INJECTION INTRAVENOUS
Status: DISPENSED | OUTPATIENT
Start: 2025-06-22 | End: 2025-06-22

## 2025-06-22 RX ADMIN — POTASSIUM CHLORIDE 10 MEQ: 7.46 INJECTION, SOLUTION INTRAVENOUS at 07:39

## 2025-06-22 RX ADMIN — THIAMINE HYDROCHLORIDE 200 MG: 100 INJECTION, SOLUTION INTRAMUSCULAR; INTRAVENOUS at 21:10

## 2025-06-22 RX ADMIN — ENOXAPARIN SODIUM 40 MG: 40 INJECTION SUBCUTANEOUS at 09:47

## 2025-06-22 RX ADMIN — MORPHINE SULFATE 1 MG: 2 INJECTION, SOLUTION INTRAMUSCULAR; INTRAVENOUS at 02:55

## 2025-06-22 RX ADMIN — DOCUSATE SODIUM 50MG AND SENNOSIDES 8.6MG 2 TABLET: 8.6; 5 TABLET, FILM COATED ORAL at 09:47

## 2025-06-22 RX ADMIN — ACETAMINOPHEN 650 MG: 325 TABLET ORAL at 12:07

## 2025-06-22 RX ADMIN — LORAZEPAM 1 MG: 0.5 TABLET ORAL at 13:14

## 2025-06-22 RX ADMIN — THIAMINE HYDROCHLORIDE 200 MG: 100 INJECTION, SOLUTION INTRAMUSCULAR; INTRAVENOUS at 06:36

## 2025-06-22 RX ADMIN — PANTOPRAZOLE SODIUM 40 MG: 40 INJECTION, POWDER, FOR SOLUTION INTRAVENOUS at 06:36

## 2025-06-22 RX ADMIN — MORPHINE SULFATE 1 MG: 2 INJECTION, SOLUTION INTRAMUSCULAR; INTRAVENOUS at 09:56

## 2025-06-22 RX ADMIN — THIAMINE HYDROCHLORIDE 200 MG: 100 INJECTION, SOLUTION INTRAMUSCULAR; INTRAVENOUS at 13:14

## 2025-06-22 RX ADMIN — LORAZEPAM 1 MG: 0.5 TABLET ORAL at 06:36

## 2025-06-22 RX ADMIN — FOLIC ACID 1 MG: 1 TABLET ORAL at 09:47

## 2025-06-22 RX ADMIN — Medication 10 ML: at 09:48

## 2025-06-22 RX ADMIN — MORPHINE SULFATE 1 MG: 2 INJECTION, SOLUTION INTRAMUSCULAR; INTRAVENOUS at 13:14

## 2025-06-22 RX ADMIN — Medication 10 ML: at 20:55

## 2025-06-22 RX ADMIN — MORPHINE SULFATE 1 MG: 2 INJECTION, SOLUTION INTRAMUSCULAR; INTRAVENOUS at 06:36

## 2025-06-22 RX ADMIN — MORPHINE SULFATE 1 MG: 2 INJECTION, SOLUTION INTRAMUSCULAR; INTRAVENOUS at 18:39

## 2025-06-22 RX ADMIN — CEFTRIAXONE SODIUM 1000 MG: 1 INJECTION, POWDER, FOR SOLUTION INTRAMUSCULAR; INTRAVENOUS at 13:14

## 2025-06-22 RX ADMIN — DOCUSATE SODIUM 50MG AND SENNOSIDES 8.6MG 2 TABLET: 8.6; 5 TABLET, FILM COATED ORAL at 20:53

## 2025-06-22 RX ADMIN — POTASSIUM CHLORIDE 20 MEQ: 750 CAPSULE, EXTENDED RELEASE ORAL at 09:47

## 2025-06-22 RX ADMIN — ACETAMINOPHEN 650 MG: 325 TABLET ORAL at 20:53

## 2025-06-22 RX ADMIN — LORAZEPAM 1 MG: 0.5 TABLET ORAL at 20:54

## 2025-06-22 NOTE — PROGRESS NOTES
[facility]   Hospitalist Progress Note  Date: 2025  Patient Name: Aubree Krueger  : 1985  MRN: [id number]  Date of admission: 2025  Room/Bed: [facility] [place]      Subjective   Subjective     Chief Complaint: Nausea, vomiting, abdominal pain    Summary:  Aubree Krueger is a 40 y.o. female with history of chronic hepatitis C which she says was successfully treated 4 years ago, bipolar disorder, nicotine addiction and ongoing cigarette smoking, history of substance abuse including marijuana, heroin and fentanyl which she says has been resolved for several years and ongoing history of alcohol abuse who presented to hospital with nausea vomiting abdominal pain. CT scan evidence of pancreatitis and an elevated lipase of 676.  Admitted for pancreatitis and UTI    Interval Followup:   Patient says that the burning sensation she was feeling is improved but the pain remains.  She wants to try drinking water.  I am okay with a clear liquid diet.  Urine culture did not show any growth but patient with leukocytosis so we will continue antibiotics.     All systems reviewed and negative except for what is outlined above.      Objective   Objective     Vitals:   Temp:  [98.4 °F (36.9 °C)-99.9 °F (37.7 °C)] 99.9 °F (37.7 °C)  Heart Rate:  [76-96] 88  Resp:  [16-18] 16  BP: (125-142)/(82-93) 126/83    Physical Exam   General: NAD, in pain  Cardiovascular: Normal S1, S2  Pulmonary: Normal work of breathing  Neuro: speech clear; no tremor  Psych: Mood and affect appropriate    Result Review    Result Review:  I have personally reviewed these results:  [x]  Laboratory      Lab 25  0540 25  0453 25  0916   WBC 11.25* 10.38 7.20   HEMOGLOBIN 11.8* 12.2 14.2   HEMATOCRIT 36.9 37.4 43.9   PLATELETS 157 200 240   NEUTROS ABS  --  9.17* 6.02   IMMATURE GRANS (ABS)  --  0.05 0.03   LYMPHS ABS  --  0.64* 0.66*   MONOS ABS  --  0.47 0.41   EOS ABS  --  0.02 0.05   MCV 93.4 92.6 93.8         Lab 25  0540  06/21/25  0453 06/20/25  0916   SODIUM 135* 138 137   POTASSIUM 3.3* 3.8 3.9   CHLORIDE 105 106 100   CO2 21.2* 22.6 22.7   ANION GAP 8.8 9.4 14.3   BUN 2.4* 7.0 6.0   CREATININE 0.54* 0.76 0.90   EGFR 121.0 103.0 84.1   GLUCOSE 113* 155* 123*   CALCIUM 8.7 9.2 9.9         Lab 06/21/25  0453 06/20/25  0916   TOTAL PROTEIN 6.3 7.5   ALBUMIN 3.8 4.6   GLOBULIN 2.5 2.9   ALT (SGPT) 59* 93*   AST (SGOT) 59* 152*   BILIRUBIN 0.7 1.5*   ALK PHOS 129* 179*   LIPASE 1,006* 676*                     Brief Urine Lab Results  (Last result in the past 365 days)        Color   Clarity   Blood   Leuk Est   Nitrite   Protein   CREAT   Urine HCG        06/20/25 0936 Yellow   Slightly Cloudy   Small (1+)   Trace   Positive   30 mg/dL (1+)                 [x]  Microbiology   Microbiology Results (last 10 days)       Procedure Component Value - Date/Time    Urine Culture - Urine, Urine, Clean Catch [[id number]] Collected: 06/20/25 0936    Lab Status: Final result Specimen: Urine, Clean Catch Updated: 06/21/25 1004     Urine Culture <10,000 CFU/mL Normal Urogenital Serenity    Narrative:      Colonization of the urinary tract without infection is common. Treatment is discouraged unless the patient is symptomatic, pregnant, or undergoing an invasive urologic procedure.          [x]  Radiology  CT Abdomen Pelvis With Contrast  Result Date: 6/20/2025  1.Findings compatible with acute pancreatitis. No well-defined abscess or pseudocyst noted at this time. Please correlate with history and amylase and lipase levels. 2.Hepatomegaly and diffuse hepatic steatosis. This is increased compared to the previous exam. Please correlate with liver function test. 3.Other findings as above. Electronically Signed: Tha Anaya MD  6/20/2025 10:40 AM EDT  Workstation ID: [id number]    []  EKG/Telemetry   []  Cardiology/Vascular   []  Pathology  []  Old records  []  Other:    Assessment & Plan        Assessment and Plan:    Acute alcoholic pancreatitis  Clear  liquid diet, IV fluids, parenteral pain medication and antiemetics, GI prophylaxis  with PPI     Leukocytosis   simple cystitis/UTI?  Continue Rocephin, urine culture with no growth     Alcoholic hepatitis  Continue to reinforce education about the risk of cirrhosis and future liver cancer given her history of hepatitis C, monitor liver function tests     History of hepatitis C which has been treated  As above, reinforced education regarding risks of liver disease with ongoing drinking on this history     Ongoing alcohol dependence  Placed on CIWA protocol, strongly encouraged outpatient treatment and complete alcohol cessation     Nicotine dependence/smoking addiction  Nicotine replacement ordered, complete cessation encouraged     Chronic bronchitis  Albuterol nebulizer treatments as needed          Discussed with RN.    VTE Prophylaxis:  Pharmacologic & mechanical VTE prophylaxis orders are present.        CODE STATUS:   Code Status (Patient has no pulse and is not breathing): CPR (Attempt to Resuscitate)  Medical Interventions (Patient has pulse or is breathing): Full Support  Level Of Support Discussed With: Patient      Electronically signed by Vijay Reyes MD, 6/22/2025, 09:13 EDT.

## 2025-06-22 NOTE — PLAN OF CARE
Goal Outcome Evaluation:  Plan of Care Reviewed With: patient        Progress: no change  Outcome Evaluation: A & O x 4, C/O pain throughout shift, pain medication given as ordered PRN. CIWA assessments during shift and medicated per score as ordered. Ad linda in room, fall precautions and education provided, understanding verified. Continuous pulse ox. in place and maintaining oxygen saturations above 90%. Appears to be resting without any issues noted during this time.

## 2025-06-22 NOTE — PLAN OF CARE
Goal Outcome Evaluation:  Plan of Care Reviewed With: patient        Progress: no change  Outcome Evaluation: Patient alert and oriented x4 on room air. Patient medicated for pain with PRN medication per mar. CIWA assessments continued. Continuous fluids administered. No new issues at this time.

## 2025-06-23 ENCOUNTER — READMISSION MANAGEMENT (OUTPATIENT)
Dept: CALL CENTER | Facility: HOSPITAL | Age: 40
End: 2025-06-23

## 2025-06-23 VITALS
BODY MASS INDEX: 19.28 KG/M2 | HEIGHT: 68 IN | HEART RATE: 92 BPM | DIASTOLIC BLOOD PRESSURE: 79 MMHG | RESPIRATION RATE: 18 BRPM | WEIGHT: 127.21 LBS | TEMPERATURE: 98.5 F | SYSTOLIC BLOOD PRESSURE: 116 MMHG | OXYGEN SATURATION: 98 %

## 2025-06-23 LAB
ANION GAP SERPL CALCULATED.3IONS-SCNC: 10.6 MMOL/L (ref 5–15)
BUN SERPL-MCNC: 5.2 MG/DL (ref 6–20)
BUN/CREAT SERPL: 8.3 (ref 7–25)
CALCIUM SPEC-SCNC: 9.8 MG/DL (ref 8.6–10.5)
CHLORIDE SERPL-SCNC: 106 MMOL/L (ref 98–107)
CO2 SERPL-SCNC: 18.4 MMOL/L (ref 22–29)
CREAT SERPL-MCNC: 0.63 MG/DL (ref 0.57–1)
DEPRECATED RDW RBC AUTO: 54.1 FL (ref 37–54)
EGFRCR SERPLBLD CKD-EPI 2021: 116.6 ML/MIN/1.73
ERYTHROCYTE [DISTWIDTH] IN BLOOD BY AUTOMATED COUNT: 15.7 % (ref 12.3–15.4)
GLUCOSE SERPL-MCNC: 84 MG/DL (ref 65–99)
HCT VFR BLD AUTO: 38 % (ref 34–46.6)
HGB BLD-MCNC: 12.1 G/DL (ref 12–15.9)
MCH RBC QN AUTO: 30 PG (ref 26.6–33)
MCHC RBC AUTO-ENTMCNC: 31.8 G/DL (ref 31.5–35.7)
MCV RBC AUTO: 94.3 FL (ref 79–97)
PLATELET # BLD AUTO: 155 10*3/MM3 (ref 140–450)
PMV BLD AUTO: 9.4 FL (ref 6–12)
POTASSIUM SERPL-SCNC: 3.5 MMOL/L (ref 3.5–5.2)
RBC # BLD AUTO: 4.03 10*6/MM3 (ref 3.77–5.28)
SODIUM SERPL-SCNC: 135 MMOL/L (ref 136–145)
WBC NRBC COR # BLD AUTO: 13.18 10*3/MM3 (ref 3.4–10.8)

## 2025-06-23 PROCEDURE — 85027 COMPLETE CBC AUTOMATED: CPT | Performed by: STUDENT IN AN ORGANIZED HEALTH CARE EDUCATION/TRAINING PROGRAM

## 2025-06-23 PROCEDURE — 25010000002 MORPHINE PER 10 MG

## 2025-06-23 PROCEDURE — 99239 HOSP IP/OBS DSCHRG MGMT >30: CPT | Performed by: STUDENT IN AN ORGANIZED HEALTH CARE EDUCATION/TRAINING PROGRAM

## 2025-06-23 PROCEDURE — 80048 BASIC METABOLIC PNL TOTAL CA: CPT | Performed by: STUDENT IN AN ORGANIZED HEALTH CARE EDUCATION/TRAINING PROGRAM

## 2025-06-23 PROCEDURE — 25010000002 THIAMINE PER 100 MG: Performed by: FAMILY MEDICINE

## 2025-06-23 PROCEDURE — 25810000003 LACTATED RINGERS PER 1000 ML: Performed by: STUDENT IN AN ORGANIZED HEALTH CARE EDUCATION/TRAINING PROGRAM

## 2025-06-23 PROCEDURE — 25010000002 ENOXAPARIN PER 10 MG: Performed by: FAMILY MEDICINE

## 2025-06-23 RX ORDER — SODIUM CHLORIDE, SODIUM LACTATE, POTASSIUM CHLORIDE, CALCIUM CHLORIDE 600; 310; 30; 20 MG/100ML; MG/100ML; MG/100ML; MG/100ML
150 INJECTION, SOLUTION INTRAVENOUS CONTINUOUS
Status: DISCONTINUED | OUTPATIENT
Start: 2025-06-23 | End: 2025-06-23 | Stop reason: HOSPADM

## 2025-06-23 RX ADMIN — DOCUSATE SODIUM 50MG AND SENNOSIDES 8.6MG 2 TABLET: 8.6; 5 TABLET, FILM COATED ORAL at 09:07

## 2025-06-23 RX ADMIN — PANTOPRAZOLE SODIUM 40 MG: 40 INJECTION, POWDER, FOR SOLUTION INTRAVENOUS at 05:01

## 2025-06-23 RX ADMIN — FOLIC ACID 1 MG: 1 TABLET ORAL at 09:07

## 2025-06-23 RX ADMIN — MORPHINE SULFATE 1 MG: 2 INJECTION, SOLUTION INTRAMUSCULAR; INTRAVENOUS at 11:14

## 2025-06-23 RX ADMIN — Medication 10 ML: at 09:08

## 2025-06-23 RX ADMIN — MORPHINE SULFATE 1 MG: 2 INJECTION, SOLUTION INTRAMUSCULAR; INTRAVENOUS at 08:00

## 2025-06-23 RX ADMIN — MORPHINE SULFATE 1 MG: 2 INJECTION, SOLUTION INTRAMUSCULAR; INTRAVENOUS at 01:10

## 2025-06-23 RX ADMIN — SODIUM CHLORIDE, POTASSIUM CHLORIDE, SODIUM LACTATE AND CALCIUM CHLORIDE 150 ML/HR: 600; 310; 30; 20 INJECTION, SOLUTION INTRAVENOUS at 09:07

## 2025-06-23 RX ADMIN — LORAZEPAM 1 MG: 0.5 TABLET ORAL at 09:07

## 2025-06-23 RX ADMIN — MORPHINE SULFATE 1 MG: 2 INJECTION, SOLUTION INTRAMUSCULAR; INTRAVENOUS at 05:00

## 2025-06-23 RX ADMIN — THIAMINE HYDROCHLORIDE 200 MG: 100 INJECTION, SOLUTION INTRAMUSCULAR; INTRAVENOUS at 05:01

## 2025-06-23 RX ADMIN — ENOXAPARIN SODIUM 40 MG: 40 INJECTION SUBCUTANEOUS at 09:07

## 2025-06-23 NOTE — PLAN OF CARE
Goal Outcome Evaluation:   Pt remains A&Ox4, VSS, CIWA score of 1-2 throughout the shift,  pain managed with PRN medication per orders.  Pt ad linda and ambulated in room, all safety precautions maintained per policy.  Will continue to monitor

## 2025-06-23 NOTE — DISCHARGE SUMMARY
[facility]         HOSPITALIST  DISCHARGE SUMMARY    Patient Name: Aubree Krueger  : 1985  MRN: [id number]    Date of Admission: 2025  Date of Discharge:  25  Primary Care Physician: Kelly Little APRN    Consults       Date and Time Order Name Status Description    2025 10:43 AM Inpatient Hospitalist Consult              Active and Resolved Hospital Problems:  Active Hospital Problems    Diagnosis POA    **Acute pancreatitis [K85.90] Yes      Resolved Hospital Problems   No resolved problems to display.       Hospital Course     Hospital Course:  Aubree Krueger is a 40 y.o. female with history of chronic hepatitis C which she says was successfully treated 4 years ago, bipolar disorder, nicotine addiction and ongoing cigarette smoking, history of substance abuse including marijuana, heroin and fentanyl which she says has been resolved for several years and ongoing history of alcohol abuse who presented to hospital with nausea vomiting abdominal pain. CT scan evidence of pancreatitis and an elevated lipase of 676. Admitted for pancreatitis and UTI.  Patient completed 3 days of ceftriaxone.  She is tolerating food today.  Patient had an elevated leukocytosis today.  Wanted to keep the patient overnight to recheck her labs and discharge in the morning.  Patient did not want to stay and left AMA.    DISCHARGE Follow Up Recommendations for labs and diagnostics:   - Follow-up with PCP      Day of Discharge     Vital Signs:  Temp:  [98.5 °F (36.9 °C)-100.4 °F (38 °C)] 98.5 °F (36.9 °C)  Heart Rate:  [81-97] 92  Resp:  [18] 18  BP: (116-124)/(76-92) 116/79  Physical Exam:   General: No acute distress  Cardiovascular: Normal S1, S2  Pulmonary: Normal work of breathing, CTAB  MSK: No lower extremity swelling      Discharge Details        Discharge Medications        ASK your doctor about these medications        Instructions Start Date   albuterol sulfate  (90 Base)  MCG/ACT inhaler  Commonly known as: PROVENTIL HFA;VENTOLIN HFA;PROAIR HFA   2 puffs, Every 4 Hours PRN               Allergies   Allergen Reactions    Sulfa Antibiotics Anaphylaxis       Discharge Disposition:  Patient left AMA    Diet:  Hospital:  Diet Order   Procedures    Diet: Gastrointestinal; Fiber-Restricted, Low Irritant; Texture: Soft to Chew (NDD 3); Soft to Chew: Whole Meat; Fluid Consistency: Thin (IDDSI 0)       Discharge Activity:   As tolerated    CODE STATUS:  Code Status and Medical Interventions: CPR (Attempt to Resuscitate); Full Support   Ordered at: 06/20/25 1342     Code Status (Patient has no pulse and is not breathing):    CPR (Attempt to Resuscitate)     Medical Interventions (Patient has pulse or is breathing):    Full Support     Level Of Support Discussed With:    Patient         No future appointments.        Pertinent  and/or Most Recent Results       LAB RESULTS:      Lab 06/23/25 0433 06/22/25 0540 06/21/25 0453 06/20/25  0916   WBC 13.18* 11.25* 10.38 7.20   HEMOGLOBIN 12.1 11.8* 12.2 14.2   HEMATOCRIT 38.0 36.9 37.4 43.9   PLATELETS 155 157 200 240   NEUTROS ABS  --   --  9.17* 6.02   IMMATURE GRANS (ABS)  --   --  0.05 0.03   LYMPHS ABS  --   --  0.64* 0.66*   MONOS ABS  --   --  0.47 0.41   EOS ABS  --   --  0.02 0.05   MCV 94.3 93.4 92.6 93.8         Lab 06/23/25 0433 06/22/25 0540 06/21/25 0453 06/20/25  0916   SODIUM 135* 135* 138 137   POTASSIUM 3.5 3.3* 3.8 3.9   CHLORIDE 106 105 106 100   CO2 18.4* 21.2* 22.6 22.7   ANION GAP 10.6 8.8 9.4 14.3   BUN 5.2* 2.4* 7.0 6.0   CREATININE 0.63 0.54* 0.76 0.90   EGFR 116.6 121.0 103.0 84.1   GLUCOSE 84 113* 155* 123*   CALCIUM 9.8 8.7 9.2 9.9         Lab 06/21/25 0453 06/20/25  0916   TOTAL PROTEIN 6.3 7.5   ALBUMIN 3.8 4.6   GLOBULIN 2.5 2.9   ALT (SGPT) 59* 93*   AST (SGOT) 59* 152*   BILIRUBIN 0.7 1.5*   ALK PHOS 129* 179*   LIPASE 1,006* 676*                     Brief Urine Lab Results  (Last result in the past 365 days)         Color   Clarity   Blood   Leuk Est   Nitrite   Protein   CREAT   Urine HCG        06/20/25 0936 Yellow   Slightly Cloudy   Small (1+)   Trace   Positive   30 mg/dL (1+)                 Microbiology Results (last 10 days)       Procedure Component Value - Date/Time    Urine Culture - Urine, Urine, Clean Catch [[id number]] Collected: 06/20/25 0936    Lab Status: Final result Specimen: Urine, Clean Catch Updated: 06/21/25 1004     Urine Culture <10,000 CFU/mL Normal Urogenital Serenity    Narrative:      Colonization of the urinary tract without infection is common. Treatment is discouraged unless the patient is symptomatic, pregnant, or undergoing an invasive urologic procedure.            CT Abdomen Pelvis With Contrast  Result Date: 6/20/2025  1.Findings compatible with acute pancreatitis. No well-defined abscess or pseudocyst noted at this time. Please correlate with history and amylase and lipase levels. 2.Hepatomegaly and diffuse hepatic steatosis. This is increased compared to the previous exam. Please correlate with liver function test. 3.Other findings as above. Electronically Signed: Tha Anaya MD  6/20/2025 10:40 AM EDT  Workstation ID: [id number]                         The ASCVD Risk score ([place] DK, et al., 2019) failed to calculate for the following reasons:    The 2019 ASCVD risk score is only valid for ages 40 to 79     Time spent on Discharge including face to face service:  35 minutes    Electronically signed by Vijay Reyes MD, 06/23/25, 2:13 PM EDT.

## 2025-06-24 NOTE — OUTREACH NOTE
Prep Survey      Flowsheet Row Responses   [facility] facility patient discharged from? [facility]   Is LACE score < 7 ? No   Eligibility Not Eligible   What are the reasons patient is not eligible? Other  [AMA]   Does the patient have one of the following disease processes/diagnoses(primary or secondary)? Other   Prep survey completed? Yes            Archana ROBERTSON - Registered Nurse

## 2025-07-10 ENCOUNTER — HOSPITAL ENCOUNTER (INPATIENT)
Facility: HOSPITAL | Age: 40
LOS: 6 days | Discharge: HOME | DRG: 438 | End: 2025-07-17
Attending: EMERGENCY MEDICINE | Admitting: HOSPITALIST

## 2025-07-10 ENCOUNTER — APPOINTMENT (OUTPATIENT)
Dept: CT IMAGING | Facility: HOSPITAL | Age: 40
DRG: 438 | End: 2025-07-10

## 2025-07-10 DIAGNOSIS — O09.529 ANTEPARTUM MULTIGRAVIDA OF ADVANCED MATERNAL AGE: ICD-10-CM

## 2025-07-10 DIAGNOSIS — K85.80 OTHER ACUTE PANCREATITIS, UNSPECIFIED COMPLICATION STATUS: ICD-10-CM

## 2025-07-10 DIAGNOSIS — G47.09 OTHER INSOMNIA: ICD-10-CM

## 2025-07-10 DIAGNOSIS — F31.62 BIPOLAR DISORDER, CURRENT EPISODE MIXED, MODERATE: ICD-10-CM

## 2025-07-10 DIAGNOSIS — K85.20 ALCOHOL-INDUCED ACUTE PANCREATITIS, UNSPECIFIED COMPLICATION STATUS: Primary | ICD-10-CM

## 2025-07-10 DIAGNOSIS — F19.11 HISTORY OF SUBSTANCE ABUSE: ICD-10-CM

## 2025-07-10 DIAGNOSIS — Z37.9 NORMAL LABOR: ICD-10-CM

## 2025-07-10 DIAGNOSIS — Z72.0 TOBACCO ABUSE: ICD-10-CM

## 2025-07-10 DIAGNOSIS — F11.10 HEROIN ABUSE: ICD-10-CM

## 2025-07-10 DIAGNOSIS — I81 PORTAL VEIN THROMBOSIS: ICD-10-CM

## 2025-07-10 DIAGNOSIS — F41.9 ANXIETY: ICD-10-CM

## 2025-07-10 DIAGNOSIS — U07.1 COVID-19 VIRUS INFECTION: ICD-10-CM

## 2025-07-10 DIAGNOSIS — B18.2 CHRONIC HEPATITIS C WITHOUT HEPATIC COMA: ICD-10-CM

## 2025-07-10 DIAGNOSIS — K85.21 ALCOHOL-INDUCED ACUTE PANCREATITIS WITH UNINFECTED NECROSIS: ICD-10-CM

## 2025-07-10 PROBLEM — K85.90 PANCREATITIS: Status: ACTIVE | Noted: 2025-07-10

## 2025-07-10 LAB
ALBUMIN SERPL-MCNC: 4.5 G/DL (ref 3.5–5.2)
ALBUMIN/GLOB SERPL: 1.7 G/DL
ALP SERPL-CCNC: 120 U/L (ref 39–117)
ALT SERPL W P-5'-P-CCNC: 18 U/L (ref 1–33)
ANION GAP SERPL CALCULATED.3IONS-SCNC: 11.2 MMOL/L (ref 5–15)
APTT PPP: 30.3 SECONDS (ref 78–95.9)
APTT PPP: 83.4 SECONDS (ref 78–95.9)
AST SERPL-CCNC: 23 U/L (ref 1–32)
BACTERIA UR QL AUTO: ABNORMAL /HPF
BASOPHILS # BLD AUTO: 0.03 10*3/MM3 (ref 0–0.2)
BASOPHILS NFR BLD AUTO: 0.4 % (ref 0–1.5)
BILIRUB SERPL-MCNC: 0.5 MG/DL (ref 0–1.2)
BILIRUB UR QL STRIP: NEGATIVE
BUN SERPL-MCNC: 6.7 MG/DL (ref 6–20)
BUN/CREAT SERPL: 9.3 (ref 7–25)
CALCIUM SPEC-SCNC: 10.7 MG/DL (ref 8.6–10.5)
CHLORIDE SERPL-SCNC: 99 MMOL/L (ref 98–107)
CLARITY UR: ABNORMAL
CO2 SERPL-SCNC: 22.8 MMOL/L (ref 22–29)
COLOR UR: ABNORMAL
CREAT SERPL-MCNC: 0.72 MG/DL (ref 0.57–1)
DEPRECATED RDW RBC AUTO: 55.8 FL (ref 37–54)
EGFRCR SERPLBLD CKD-EPI 2021: 109.9 ML/MIN/1.73
EOSINOPHIL # BLD AUTO: 0.12 10*3/MM3 (ref 0–0.4)
EOSINOPHIL NFR BLD AUTO: 1.6 % (ref 0.3–6.2)
ERYTHROCYTE [DISTWIDTH] IN BLOOD BY AUTOMATED COUNT: 16.1 % (ref 12.3–15.4)
ETHANOL BLD-MCNC: <10 MG/DL (ref 0–10)
ETHANOL UR QL: <0.01 %
GLOBULIN UR ELPH-MCNC: 2.6 GM/DL
GLUCOSE SERPL-MCNC: 90 MG/DL (ref 65–99)
GLUCOSE UR STRIP-MCNC: NEGATIVE MG/DL
HCG INTACT+B SERPL-ACNC: <0.5 MIU/ML
HCT VFR BLD AUTO: 37.5 % (ref 34–46.6)
HGB BLD-MCNC: 12.2 G/DL (ref 12–15.9)
HGB UR QL STRIP.AUTO: NEGATIVE
HOLD SPECIMEN: NORMAL
HOLD SPECIMEN: NORMAL
HYALINE CASTS UR QL AUTO: ABNORMAL /LPF
IMM GRANULOCYTES # BLD AUTO: 0.02 10*3/MM3 (ref 0–0.05)
IMM GRANULOCYTES NFR BLD AUTO: 0.3 % (ref 0–0.5)
INR PPP: 1.16 (ref 0.86–1.15)
KETONES UR QL STRIP: ABNORMAL
LEUKOCYTE ESTERASE UR QL STRIP.AUTO: ABNORMAL
LIPASE SERPL-CCNC: 573 U/L (ref 13–60)
LYMPHOCYTES # BLD AUTO: 0.95 10*3/MM3 (ref 0.7–3.1)
LYMPHOCYTES NFR BLD AUTO: 12.4 % (ref 19.6–45.3)
MAGNESIUM SERPL-MCNC: 2.1 MG/DL (ref 1.6–2.6)
MCH RBC QN AUTO: 30.7 PG (ref 26.6–33)
MCHC RBC AUTO-ENTMCNC: 32.5 G/DL (ref 31.5–35.7)
MCV RBC AUTO: 94.2 FL (ref 79–97)
MONOCYTES # BLD AUTO: 0.45 10*3/MM3 (ref 0.1–0.9)
MONOCYTES NFR BLD AUTO: 5.9 % (ref 5–12)
NEUTROPHILS NFR BLD AUTO: 6.12 10*3/MM3 (ref 1.7–7)
NEUTROPHILS NFR BLD AUTO: 79.4 % (ref 42.7–76)
NITRITE UR QL STRIP: NEGATIVE
NRBC BLD AUTO-RTO: 0 /100 WBC (ref 0–0.2)
PH UR STRIP.AUTO: 6.5 [PH] (ref 5–8)
PHOSPHATE SERPL-MCNC: 3.1 MG/DL (ref 2.5–4.5)
PLATELET # BLD AUTO: 293 10*3/MM3 (ref 140–450)
PMV BLD AUTO: 8.9 FL (ref 6–12)
POTASSIUM SERPL-SCNC: 4.1 MMOL/L (ref 3.5–5.2)
PROT SERPL-MCNC: 7.1 G/DL (ref 6–8.5)
PROT UR QL STRIP: ABNORMAL
PROTHROMBIN TIME: 15.3 SECONDS (ref 11.8–14.9)
RBC # BLD AUTO: 3.98 10*6/MM3 (ref 3.77–5.28)
RBC # UR STRIP: ABNORMAL /HPF
REF LAB TEST METHOD: ABNORMAL
SODIUM SERPL-SCNC: 133 MMOL/L (ref 136–145)
SP GR UR STRIP: 1.02 (ref 1–1.03)
SQUAMOUS #/AREA URNS HPF: ABNORMAL /HPF
TRIGL SERPL-MCNC: 83 MG/DL (ref 0–150)
UROBILINOGEN UR QL STRIP: ABNORMAL
WBC # UR STRIP: ABNORMAL /HPF
WBC NRBC COR # BLD AUTO: 7.69 10*3/MM3 (ref 3.4–10.8)
WHOLE BLOOD HOLD COAG: NORMAL
WHOLE BLOOD HOLD SPECIMEN: NORMAL

## 2025-07-10 PROCEDURE — 81001 URINALYSIS AUTO W/SCOPE: CPT | Performed by: EMERGENCY MEDICINE

## 2025-07-10 PROCEDURE — 83735 ASSAY OF MAGNESIUM: CPT | Performed by: HOSPITALIST

## 2025-07-10 PROCEDURE — 80053 COMPREHEN METABOLIC PANEL: CPT | Performed by: EMERGENCY MEDICINE

## 2025-07-10 PROCEDURE — 85730 THROMBOPLASTIN TIME PARTIAL: CPT

## 2025-07-10 PROCEDURE — G0378 HOSPITAL OBSERVATION PER HR: HCPCS

## 2025-07-10 PROCEDURE — 25510000001 IOPAMIDOL PER 1 ML: Performed by: EMERGENCY MEDICINE

## 2025-07-10 PROCEDURE — 83690 ASSAY OF LIPASE: CPT | Performed by: EMERGENCY MEDICINE

## 2025-07-10 PROCEDURE — 25010000002 HYDROMORPHONE 1 MG/ML SOLUTION: Performed by: INTERNAL MEDICINE

## 2025-07-10 PROCEDURE — 85610 PROTHROMBIN TIME: CPT

## 2025-07-10 PROCEDURE — 25010000002 ONDANSETRON PER 1 MG

## 2025-07-10 PROCEDURE — 85025 COMPLETE CBC W/AUTO DIFF WBC: CPT | Performed by: EMERGENCY MEDICINE

## 2025-07-10 PROCEDURE — 85730 THROMBOPLASTIN TIME PARTIAL: CPT | Performed by: EMERGENCY MEDICINE

## 2025-07-10 PROCEDURE — 25010000002 MORPHINE PER 10 MG: Performed by: EMERGENCY MEDICINE

## 2025-07-10 PROCEDURE — 25810000003 SODIUM CHLORIDE 0.9 % SOLUTION

## 2025-07-10 PROCEDURE — 82077 ASSAY SPEC XCP UR&BREATH IA: CPT

## 2025-07-10 PROCEDURE — 36415 COLL VENOUS BLD VENIPUNCTURE: CPT | Performed by: EMERGENCY MEDICINE

## 2025-07-10 PROCEDURE — 25010000002 HEPARIN (PORCINE) 25000-0.45 UT/250ML-% SOLUTION

## 2025-07-10 PROCEDURE — 84478 ASSAY OF TRIGLYCERIDES: CPT | Performed by: HOSPITALIST

## 2025-07-10 PROCEDURE — 74177 CT ABD & PELVIS W/CONTRAST: CPT

## 2025-07-10 PROCEDURE — 84702 CHORIONIC GONADOTROPIN TEST: CPT | Performed by: EMERGENCY MEDICINE

## 2025-07-10 PROCEDURE — 25810000003 LACTATED RINGERS PER 1000 ML: Performed by: HOSPITALIST

## 2025-07-10 PROCEDURE — 25010000002 HYDROMORPHONE 1 MG/ML SOLUTION: Performed by: HOSPITALIST

## 2025-07-10 PROCEDURE — 99222 1ST HOSP IP/OBS MODERATE 55: CPT | Performed by: HOSPITALIST

## 2025-07-10 PROCEDURE — 82746 ASSAY OF FOLIC ACID SERUM: CPT | Performed by: HOSPITALIST

## 2025-07-10 PROCEDURE — 99285 EMERGENCY DEPT VISIT HI MDM: CPT

## 2025-07-10 PROCEDURE — 82607 VITAMIN B-12: CPT | Performed by: HOSPITALIST

## 2025-07-10 PROCEDURE — 25810000003 SODIUM CHLORIDE 0.9 % SOLUTION: Performed by: HOSPITALIST

## 2025-07-10 PROCEDURE — 84100 ASSAY OF PHOSPHORUS: CPT | Performed by: HOSPITALIST

## 2025-07-10 RX ORDER — AMOXICILLIN 250 MG
2 CAPSULE ORAL 2 TIMES DAILY PRN
Status: DISCONTINUED | OUTPATIENT
Start: 2025-07-10 | End: 2025-07-17 | Stop reason: HOSPADM

## 2025-07-10 RX ORDER — ACETAMINOPHEN 650 MG/1
650 SUPPOSITORY RECTAL EVERY 4 HOURS PRN
Status: DISCONTINUED | OUTPATIENT
Start: 2025-07-10 | End: 2025-07-17 | Stop reason: HOSPADM

## 2025-07-10 RX ORDER — SODIUM CHLORIDE 9 MG/ML
40 INJECTION, SOLUTION INTRAVENOUS AS NEEDED
Status: DISCONTINUED | OUTPATIENT
Start: 2025-07-10 | End: 2025-07-17 | Stop reason: HOSPADM

## 2025-07-10 RX ORDER — HYDROCODONE BITARTRATE AND ACETAMINOPHEN 5; 325 MG/1; MG/1
1 TABLET ORAL EVERY 6 HOURS PRN
Status: DISCONTINUED | OUTPATIENT
Start: 2025-07-10 | End: 2025-07-14

## 2025-07-10 RX ORDER — ONDANSETRON 4 MG/1
4 TABLET, ORALLY DISINTEGRATING ORAL EVERY 6 HOURS PRN
Status: DISCONTINUED | OUTPATIENT
Start: 2025-07-10 | End: 2025-07-15

## 2025-07-10 RX ORDER — NICOTINE 21 MG/24HR
1 PATCH, TRANSDERMAL 24 HOURS TRANSDERMAL
Status: DISCONTINUED | OUTPATIENT
Start: 2025-07-10 | End: 2025-07-17 | Stop reason: HOSPADM

## 2025-07-10 RX ORDER — SODIUM CHLORIDE 0.9 % (FLUSH) 0.9 %
10 SYRINGE (ML) INJECTION AS NEEDED
Status: DISCONTINUED | OUTPATIENT
Start: 2025-07-10 | End: 2025-07-11

## 2025-07-10 RX ORDER — ACETAMINOPHEN 325 MG/1
650 TABLET ORAL EVERY 4 HOURS PRN
Status: DISCONTINUED | OUTPATIENT
Start: 2025-07-10 | End: 2025-07-17 | Stop reason: HOSPADM

## 2025-07-10 RX ORDER — BISACODYL 10 MG
10 SUPPOSITORY, RECTAL RECTAL DAILY PRN
Status: DISCONTINUED | OUTPATIENT
Start: 2025-07-10 | End: 2025-07-17 | Stop reason: HOSPADM

## 2025-07-10 RX ORDER — SODIUM CHLORIDE 0.9 % (FLUSH) 0.9 %
10 SYRINGE (ML) INJECTION AS NEEDED
Status: DISCONTINUED | OUTPATIENT
Start: 2025-07-10 | End: 2025-07-17 | Stop reason: HOSPADM

## 2025-07-10 RX ORDER — POLYETHYLENE GLYCOL 3350 17 G/17G
17 POWDER, FOR SOLUTION ORAL DAILY PRN
Status: DISCONTINUED | OUTPATIENT
Start: 2025-07-10 | End: 2025-07-17 | Stop reason: HOSPADM

## 2025-07-10 RX ORDER — IOPAMIDOL 755 MG/ML
100 INJECTION, SOLUTION INTRAVASCULAR
Status: COMPLETED | OUTPATIENT
Start: 2025-07-10 | End: 2025-07-10

## 2025-07-10 RX ORDER — OXYCODONE HYDROCHLORIDE 5 MG/1
5 TABLET ORAL EVERY 6 HOURS PRN
Status: DISCONTINUED | OUTPATIENT
Start: 2025-07-10 | End: 2025-07-15

## 2025-07-10 RX ORDER — ALBUTEROL SULFATE 90 UG/1
2 INHALANT RESPIRATORY (INHALATION) EVERY 4 HOURS PRN
Status: DISCONTINUED | OUTPATIENT
Start: 2025-07-10 | End: 2025-07-10

## 2025-07-10 RX ORDER — HEPARIN SODIUM 10000 [USP'U]/100ML
18 INJECTION, SOLUTION INTRAVENOUS
Status: DISCONTINUED | OUTPATIENT
Start: 2025-07-10 | End: 2025-07-12

## 2025-07-10 RX ORDER — ONDANSETRON 2 MG/ML
4 INJECTION INTRAMUSCULAR; INTRAVENOUS EVERY 6 HOURS PRN
Status: DISCONTINUED | OUTPATIENT
Start: 2025-07-10 | End: 2025-07-10

## 2025-07-10 RX ORDER — BISACODYL 5 MG/1
5 TABLET, DELAYED RELEASE ORAL DAILY PRN
Status: DISCONTINUED | OUTPATIENT
Start: 2025-07-10 | End: 2025-07-17 | Stop reason: HOSPADM

## 2025-07-10 RX ORDER — ACETAMINOPHEN 160 MG/5ML
650 SOLUTION ORAL EVERY 4 HOURS PRN
Status: DISCONTINUED | OUTPATIENT
Start: 2025-07-10 | End: 2025-07-17 | Stop reason: HOSPADM

## 2025-07-10 RX ORDER — ALBUTEROL SULFATE 0.83 MG/ML
2.5 SOLUTION RESPIRATORY (INHALATION) EVERY 6 HOURS PRN
Status: DISCONTINUED | OUTPATIENT
Start: 2025-07-10 | End: 2025-07-17 | Stop reason: HOSPADM

## 2025-07-10 RX ORDER — ONDANSETRON 2 MG/ML
4 INJECTION INTRAMUSCULAR; INTRAVENOUS EVERY 6 HOURS PRN
Status: DISCONTINUED | OUTPATIENT
Start: 2025-07-10 | End: 2025-07-15

## 2025-07-10 RX ORDER — SODIUM CHLORIDE, SODIUM LACTATE, POTASSIUM CHLORIDE, CALCIUM CHLORIDE 600; 310; 30; 20 MG/100ML; MG/100ML; MG/100ML; MG/100ML
150 INJECTION, SOLUTION INTRAVENOUS CONTINUOUS
Status: DISCONTINUED | OUTPATIENT
Start: 2025-07-10 | End: 2025-07-11

## 2025-07-10 RX ORDER — PANTOPRAZOLE SODIUM 40 MG/1
40 TABLET, DELAYED RELEASE ORAL
Status: DISCONTINUED | OUTPATIENT
Start: 2025-07-10 | End: 2025-07-12

## 2025-07-10 RX ORDER — ONDANSETRON 2 MG/ML
4 INJECTION INTRAMUSCULAR; INTRAVENOUS ONCE
Status: COMPLETED | OUTPATIENT
Start: 2025-07-10 | End: 2025-07-10

## 2025-07-10 RX ORDER — SODIUM CHLORIDE 0.9 % (FLUSH) 0.9 %
10 SYRINGE (ML) INJECTION EVERY 12 HOURS SCHEDULED
Status: DISCONTINUED | OUTPATIENT
Start: 2025-07-10 | End: 2025-07-17 | Stop reason: HOSPADM

## 2025-07-10 RX ADMIN — SODIUM CHLORIDE 1000 ML: 9 INJECTION, SOLUTION INTRAVENOUS at 18:41

## 2025-07-10 RX ADMIN — ONDANSETRON 4 MG: 2 INJECTION, SOLUTION INTRAMUSCULAR; INTRAVENOUS at 13:39

## 2025-07-10 RX ADMIN — MORPHINE SULFATE 4 MG: 4 INJECTION, SOLUTION INTRAMUSCULAR; INTRAVENOUS at 13:39

## 2025-07-10 RX ADMIN — SODIUM CHLORIDE 1000 ML: 9 INJECTION, SOLUTION INTRAVENOUS at 13:39

## 2025-07-10 RX ADMIN — Medication 5 MG: at 20:08

## 2025-07-10 RX ADMIN — IOPAMIDOL 80 ML: 755 INJECTION, SOLUTION INTRAVENOUS at 14:06

## 2025-07-10 RX ADMIN — SODIUM CHLORIDE, POTASSIUM CHLORIDE, SODIUM LACTATE AND CALCIUM CHLORIDE 150 ML/HR: 600; 310; 30; 20 INJECTION, SOLUTION INTRAVENOUS at 18:41

## 2025-07-10 RX ADMIN — HEPARIN SODIUM 18 UNITS/KG/HR: 10000 INJECTION, SOLUTION INTRAVENOUS at 15:42

## 2025-07-10 RX ADMIN — HEPARIN SODIUM 18 UNITS/KG/HR: 10000 INJECTION, SOLUTION INTRAVENOUS at 18:41

## 2025-07-10 RX ADMIN — Medication 10 ML: at 20:08

## 2025-07-10 RX ADMIN — HYDROMORPHONE HYDROCHLORIDE 0.5 MG: 1 INJECTION, SOLUTION INTRAMUSCULAR; INTRAVENOUS; SUBCUTANEOUS at 20:08

## 2025-07-10 RX ADMIN — PANTOPRAZOLE SODIUM 40 MG: 40 TABLET, DELAYED RELEASE ORAL at 18:41

## 2025-07-10 RX ADMIN — OXYCODONE HYDROCHLORIDE 5 MG: 5 TABLET ORAL at 18:40

## 2025-07-10 NOTE — H&P
[facility]  Date: 7/10/2025   Patient Name: Aubree Krueger  : 1985  MRN: [id number]  Primary Care Physician:  Provider, No Known  Date of admission: 7/10/2025    Subjective abdominal pain  Subjective   Chief Complaint: Abdominal pain    HPI:  Aubree Krueger is a 40 y.o. female who presents with severe abdominal pain, nausea, vomiting.  Patient was recently diagnosed with pancreatitis and signed out AMA.  She is unable to tolerate food without severe abdominal pain.  Patient states that she has not drank alcohol since prior hospitalization.    On arrival to the ED, patient temperature 98.4, pulse 87, respiratory rate of 18, blood pressure 91/59, and she saturating 100% on room air.    Findings of CT abdomen and pelvis: There are findings still compatible with acute pancreatitis.  There is no pancreatic pseudocyst abscess or necrosis.  The degree of peripancreatic inflammatory change is decreased.  Interval development of partial acute/subacute venous thrombosis of the main portal vein at the portal venous confluence.  There is also similarly appearing occlusive thrombus involving the proximal superior mesenteric vein extending into the branch vessels.  Thickening of the wall of the descending limb of the duodenal C-loop probably representing reactive duodenitis.  Hepatic steatosis.  Hepatosplenomegaly.  Trace amount of free fluid.    On labs, patient sodium is 133, potassium is 4.1, calcium is 10.7, alkaline phosphatase is 120, lipase is improved from 1006-5 73, INR is 1.16, white blood cell count is 7.69.  Personal History     Past Medical History:  Past Medical History:   Diagnosis Date    Anemia     1st pregnancy    Bipolar disorder     Depression     Hepatitis C     finished tx     PONV (postoperative nausea and vomiting) 2004    Substance abuse 2007    Varicella          Past Surgical History:  Past Surgical History:   Procedure Laterality Date     KNEE SURGERY Left     2 surgeries    WISDOM TOOTH EXTRACTION  2007        Family History:   Breast Cancer-related family history includes Breast cancer in her paternal aunt and paternal aunt.      Social History:   Social History     Socioeconomic History    Marital status: Single    Years of education: bachelors degree   Tobacco Use    Smoking status: Heavy Smoker     Current packs/day: 0.50     Average packs/day: 0.5 packs/day for 20.5 years (10.3 ttl pk-yrs)     Types: Cigarettes     Start date: 2005     Passive exposure: Current    Smokeless tobacco: Never    Tobacco comments:     Workin on quittting   Vaping Use    Vaping status: Former    Quit date: 6/13/2022    Substances: Nicotine   Substance and Sexual Activity    Alcohol use: Not Currently    Drug use: Not Currently     Frequency: 3.0 times per week     Types: Marijuana     Comment: current    Sexual activity: Not Currently     Birth control/protection: None         Home Medications:  albuterol sulfate HFA    Allergies:  Allergies   Allergen Reactions    Sulfa Antibiotics Anaphylaxis       Review of Systems   All systems were reviewed and negative except for: Abdominal pain, nausea, vomiting    Objective   Objective     Vitals:   Temp:  [97.7 °F (36.5 °C)-98.4 °F (36.9 °C)] 97.7 °F (36.5 °C)  Heart Rate:  [58-87] 80  Resp:  [17-19] 18  BP: ()/(50-96) 105/72    Physical Exam   Physical Exam  HENT:      Mouth/Throat:      Mouth: Mucous membranes are moist.   Eyes:      Pupils: Pupils are equal, round, and reactive to light.   Cardiovascular:      Rate and Rhythm: Normal rate.   Pulmonary:      Effort: Pulmonary effort is normal.   Abdominal:      General: Abdomen is flat.      Tenderness: There is abdominal tenderness.   Musculoskeletal:         General: Normal range of motion.   Skin:     General: Skin is warm and dry.   Neurological:      Mental Status: She is alert.   Psychiatric:         Mood and Affect: Mood normal.          Result Review       Result Review:  I have personally reviewed the results from the time of this admission to 7/10/2025 18:39 EDT and agree with these findings:  [x]  Laboratory  [x]  Microbiology  [x]  Radiology  [x]  EKG/Telemetry   [x]  Cardiology/Vascular   [x]  Pathology  [x]  Old records  [x]  Other:    Lab Results (most recent)       Procedure Component Value Units Date/Time    Triglycerides [[id number]]  (Normal) Collected: 07/10/25 1244    Specimen: Blood Updated: 07/10/25 1755     Triglycerides 83 mg/dL     aPTT [[id number]]  (Abnormal) Collected: 07/10/25 1244    Specimen: Blood Updated: 07/10/25 1533     PTT 30.3 seconds     Ethanol [[id number]] Collected: 07/10/25 1244    Specimen: Blood Updated: 07/10/25 1357     Ethanol <10 mg/dL      Ethanol % <0.010 %     Narrative:      Not for legal purposes.    Protime-INR [[id number]]  (Abnormal) Collected: 07/10/25 1244    Specimen: Blood Updated: 07/10/25 1353     Protime 15.3 Seconds      INR 1.16    Narrative:      Suggested Therapeutic Ranges For Oral Anticoagulant Therapy:  Level of Therapy                      INR Target Range  Standard Dose                            2.0-3.0  High Dose                                2.5-3.5  Patients not receiving anticoagulant  Therapy Normal Range                     0.86-1.15    Lipase [[id number]]  (Abnormal) Collected: 07/10/25 1244    Specimen: Blood Updated: 07/10/25 1323     Lipase 573 U/L     Comprehensive Metabolic Panel [[id number]]  (Abnormal) Collected: 07/10/25 1244    Specimen: Blood Updated: 07/10/25 1315     Glucose 90 mg/dL      BUN 6.7 mg/dL      Creatinine 0.72 mg/dL      Sodium 133 mmol/L      Potassium 4.1 mmol/L      Chloride 99 mmol/L      CO2 22.8 mmol/L      Calcium 10.7 mg/dL      Total Protein 7.1 g/dL      Albumin 4.5 g/dL      ALT (SGPT) 18 U/L      AST (SGOT) 23 U/L      Alkaline Phosphatase 120 U/L      Total Bilirubin 0.5 mg/dL      Globulin 2.6 gm/dL      A/G Ratio 1.7 g/dL      BUN/Creatinine Ratio 9.3      Anion Gap 11.2 mmol/L      eGFR 109.9 mL/min/1.73     Narrative:      GFR Categories in Chronic Kidney Disease (CKD)              GFR Category          GFR (mL/min/1.73)    Interpretation  G1                    90 or greater        Normal or high (1)  G2                    60-89                Mild decrease (1)  G3a                   45-59                Mild to moderate decrease  G3b                   30-44                Moderate to severe decrease  G4                    15-29                Severe decrease  G5                    14 or less           Kidney failure    (1)In the absence of evidence of kidney disease, neither GFR category G1 or G2 fulfill the criteria for CKD.    eGFR calculation 2021 CKD-EPI creatinine equation, which does not include race as a factor    hCG, Quantitative, Pregnancy [[id number]] Collected: 07/10/25 1244    Specimen: Blood Updated: 07/10/25 1313     HCG Quantitative <0.50 mIU/mL     Narrative:      HCG Ranges by Gestational Age    Females - non-pregnant premenopausal   </= 1mIU/mL HCG  Females - postmenopausal               </= 7mIU/mL HCG    3 Weeks       5.4   -      72 mIU/mL  4 Weeks      10.2   -     708 mIU/mL  5 Weeks       217   -   8,245 mIU/mL  6 Weeks       152   -  32,177 mIU/mL  7 Weeks     4,059   - 153,767 mIU/mL  8 Weeks    31,366   - 149,094 mIU/mL  9 Weeks    59,109   - 135,901 mIU/mL  10 Weeks   44,186   - 170,409 mIU/mL  12 Weeks   27,107   - 201,615 mIU/mL  14 Weeks   24,302   -  93,646 mIU/mL  15 Weeks   12,540   -  69,747 mIU/mL  16 Weeks    8,904   -  55,332 mIU/mL  17 Weeks    8,240   -  51,793 mIU/mL  18 Weeks    9,649   -  55,271 mIU/mL      Urinalysis, Microscopic Only - Urine, Clean Catch [[id number]]  (Abnormal) Collected: 07/10/25 1243    Specimen: Urine, Clean Catch Updated: 07/10/25 1304     RBC, UA 0-2 /HPF      WBC, UA 11-20 /HPF      Bacteria, UA 4+ /HPF      Squamous Epithelial Cells, UA 13-20 /HPF      Hyaline Casts, UA 0-2 /LPF      Methodology  Automated Microscopy    Urinalysis With Microscopic If Indicated (No Culture) - Urine, Clean Catch [[id number]]  (Abnormal) Collected: 07/10/25 1243    Specimen: Urine, Clean Catch Updated: 07/10/25 1300     Color, UA Dark Yellow     Appearance, UA Cloudy     pH, UA 6.5     Specific Gravity, UA 1.019     Glucose, UA Negative     Ketones, UA 15 mg/dL (1+)     Bilirubin, UA Negative     Blood, UA Negative     Protein, UA Trace     Leuk Esterase, UA Moderate (2+)     Nitrite, UA Negative     Urobilinogen, UA 1.0 E.U./dL    [facility] Draw [[id number]] Collected: 07/10/25 1244    Specimen: Blood Updated: 07/10/25 1300    Narrative:      The following orders were created for panel order [facility] Draw.  Procedure                               Abnormality         Status                     ---------                               -----------         ------                     Green Top (Gel)[[id number]]                                  Final result               Lavender Top[[id number]]                                     Final result               Gold Top - SST[[id number]]                                   Final result               Light Blue Top[[id number]]                                   Final result                 Please view results for these tests on the individual orders.    Green Top (Gel) [[id number]] Collected: 07/10/25 1244    Specimen: Blood Updated: 07/10/25 1300     Extra Tube Hold for add-ons.     Comment: Auto resulted.       Lavender Top [[id number]] Collected: 07/10/25 1244    Specimen: Blood Updated: 07/10/25 1300     Extra Tube hold for add-on     Comment: Auto resulted       Gold Top - SST [[id number]] Collected: 07/10/25 1244    Specimen: Blood Updated: 07/10/25 1300     Extra Tube Hold for add-ons.     Comment: Auto resulted.       Light Blue Top [[id number]] Collected: 07/10/25 1244    Specimen: Blood Updated: 07/10/25 1300     Extra Tube Hold for add-ons.     Comment: Auto resulted       CBC & Differential  [[id number]]  (Abnormal) Collected: 07/10/25 1244    Specimen: Blood Updated: 07/10/25 1257    Narrative:      The following orders were created for panel order CBC & Differential.  Procedure                               Abnormality         Status                     ---------                               -----------         ------                     CBC Auto Differential[[id number]]        Abnormal            Final result                 Please view results for these tests on the individual orders.    CBC Auto Differential [[id number]]  (Abnormal) Collected: 07/10/25 1244    Specimen: Blood Updated: 07/10/25 1257     WBC 7.69 10*3/mm3      RBC 3.98 10*6/mm3      Hemoglobin 12.2 g/dL      Hematocrit 37.5 %      MCV 94.2 fL      MCH 30.7 pg      MCHC 32.5 g/dL      RDW 16.1 %      RDW-SD 55.8 fl      MPV 8.9 fL      Platelets 293 10*3/mm3      Neutrophil % 79.4 %      Lymphocyte % 12.4 %      Monocyte % 5.9 %      Eosinophil % 1.6 %      Basophil % 0.4 %      Immature Grans % 0.3 %      Neutrophils, Absolute 6.12 10*3/mm3      Lymphocytes, Absolute 0.95 10*3/mm3      Monocytes, Absolute 0.45 10*3/mm3      Eosinophils, Absolute 0.12 10*3/mm3      Basophils, Absolute 0.03 10*3/mm3      Immature Grans, Absolute 0.02 10*3/mm3      nRBC 0.0 /100 WBC             CT Abdomen Pelvis With Contrast  Result Date: 7/10/2025  Impression: 1.There are findings still compatible with acute pancreatitis. There is no pancreatic pseudocyst, abscess, or necrosis. The degree of peripancreatic inflammatory change has decreased. 2.Interval development of partial acute/subacute venous thrombosis of the main portal vein at the portal venous confluence. There is also similar appearing partially occlusive thrombus involving proximal superior mesenteric vein extending into one of the  branch vessels. 3.Thickening of the wall of the descending limb of the duodenal C-loop probably representing reactive duodenitis. 4.Hepatic steatosis.  5.Hepatosplenomegaly. 6.Trace amount of free pelvic fluid. 7.The abnormal results were discussed with DAMIAN Ellis by telephone. Electronically Signed: Tyrese Roberson MD  7/10/2025 2:33 PM EDT  Workstation ID: [id number]      Assessment & Plan   Assessment / Plan   #1 pancreatitis improving  -IV fluids, pain medication, antiemetics.  -Will check triglycerides.  -Patient states that she has abstained from alcohol since last admission.    #2 portal vein thrombosis probably secondary to inflammation  -Vascular surgery recommended starting the patient on a heparin drip since she is symptomatic.    #3  Alcohol abuse  -Patient states she has not drank alcohol since last admission.    #4 bipolar disease  -Patient not on medication.    #5 hepatitis C which has been treated.    #6 nicotine dependence  -Nicotine patch ordered    #7 asthma  -Inhaler ordered    #8 asymptomatic UTI  -Will not treat.       VTE Prophylaxis:  Pharmacologic & mechanical VTE prophylaxis orders are present.        CODE STATUS:    Code Status (Patient has no pulse and is not breathing): CPR (Attempt to Resuscitate)  Medical Interventions (Patient has pulse or is breathing): Full Support  Level Of Support Discussed With: Patient      Admission Status:  I believe this patient meets observation status.    Electronically signed by Katherine Soliz DO, 07/10/25, 6:39 PM EDT.

## 2025-07-10 NOTE — ED PROVIDER NOTES
Time: 1:23 PM EDT  Date of encounter:  7/10/2025  Independent Historian/Clinical History and Information was obtained by:   Patient    History is limited by: N/A    Chief Complaint: Abdominal pain      History of Present Illness:  Patient is a 40 y.o. year old female who presents to the emergency department for evaluation of abdominal pain, nausea, vomiting.  Patient reports recently diagnosed with pancreatitis, had a recent hospital admission.  She reports she had to leave the hospital against medical advice due to a family emergency.  She reports she has been unable to tolerate eating anything solid without severe abdominal pain.  She reports decreased oral intake.  Denies fevers or chills.        Patient Care Team  Primary Care Provider: Provider, No Known    Past Medical History:     Allergies   Allergen Reactions    Sulfa Antibiotics Anaphylaxis     Past Medical History:   Diagnosis Date    Anemia 2003 1st pregnancy    Bipolar disorder 2000    Depression     Hepatitis C     finished tx 2021    PONV (postoperative nausea and vomiting) 2004    Substance abuse 2007    Varicella      Past Surgical History:   Procedure Laterality Date    KNEE SURGERY Left     2 surgeries    WISDOM TOOTH EXTRACTION  2007     Family History   Problem Relation Age of Onset    Breast cancer Paternal Aunt     Breast cancer Paternal Aunt        Home Medications:  Prior to Admission medications    Medication Sig Start Date End Date Taking? Authorizing Provider   albuterol sulfate  (90 Base) MCG/ACT inhaler Inhale 2 puffs Every 4 (Four) Hours As Needed for Wheezing or Shortness of Air. 5/24/25   Provider, MD Joleen        Social History:   Social History     Tobacco Use    Smoking status: Heavy Smoker     Current packs/day: 0.50     Average packs/day: 0.7 packs/day for 35.5 years (25.3 ttl pk-yrs)     Types: Cigarettes     Start date: 2005     Passive exposure: Never    Smokeless tobacco: Never    Tobacco comments:     Terri  "on quittting   Vaping Use    Vaping status: Former    Quit date: 6/13/2022    Substances: Nicotine   Substance Use Topics    Alcohol use: No    Drug use: Not Currently     Types: Fentanyl, Heroin, Marijuana     Comment: Quit in 2020         Review of Systems:  Review of Systems   Constitutional:  Negative for fever.   HENT:  Negative for sore throat.    Eyes: Negative.    Respiratory:  Negative for cough and shortness of breath.    Cardiovascular:  Negative for chest pain.   Gastrointestinal:  Positive for abdominal pain, nausea and vomiting. Negative for diarrhea.   Genitourinary:  Negative for dysuria.   Musculoskeletal:  Negative for neck pain.   Skin:  Negative for rash.   Allergic/Immunologic: Negative.    Neurological:  Positive for weakness (generalized). Negative for numbness and headaches.   Hematological: Negative.    Psychiatric/Behavioral: Negative.     All other systems reviewed and are negative.       Physical Exam:  BP 97/66   Pulse 65   Temp 98.4 °F (36.9 °C) (Oral)   Resp 18   Ht 172.7 cm (68\")   Wt 56.1 kg (123 lb 10.9 oz)   SpO2 97%   BMI 18.81 kg/m²     Physical Exam  Vitals and nursing note reviewed.   Constitutional:       General: She is in acute distress.      Appearance: Normal appearance. She is ill-appearing. She is not toxic-appearing.   HENT:      Head: Normocephalic and atraumatic.      Jaw: There is normal jaw occlusion.   Eyes:      General: Lids are normal.      Extraocular Movements: Extraocular movements intact.      Conjunctiva/sclera: Conjunctivae normal.      Pupils: Pupils are equal, round, and reactive to light.   Cardiovascular:      Rate and Rhythm: Normal rate and regular rhythm.      Pulses: Normal pulses.      Heart sounds: Normal heart sounds.   Pulmonary:      Effort: Pulmonary effort is normal. No respiratory distress.      Breath sounds: Normal breath sounds. No wheezing or rhonchi.   Abdominal:      General: Abdomen is flat.      Palpations: Abdomen is soft. "      Tenderness: There is generalized abdominal tenderness. There is no guarding or rebound.   Musculoskeletal:         General: Normal range of motion.      Cervical back: Normal range of motion and neck supple.      Right lower leg: No edema.      Left lower leg: No edema.   Skin:     General: Skin is warm and dry.   Neurological:      Mental Status: She is alert and oriented to person, place, and time. Mental status is at baseline.   Psychiatric:         Mood and Affect: Mood normal. Affect is tearful.            Medical Decision Making:      Comorbidities that affect care:    Bipolar disorder, hepatitis C, Smoking, Substance Abuse    External Notes reviewed:    Previous Admission Note: Reviewed previous hospital admission note from 6/20/2025, patient left the hospital AGAINST MEDICAL ADVICE, was admitted for alcohol induced acute pancreatitis      The following orders were placed and all results were independently analyzed by me:  Orders Placed This Encounter   Procedures    CT Abdomen Pelvis With Contrast    [place] Draw    Comprehensive Metabolic Panel    Lipase    Urinalysis With Microscopic If Indicated (No Culture) - Urine, Clean Catch    hCG, Quantitative, Pregnancy    CBC Auto Differential    Urinalysis, Microscopic Only - Urine, Clean Catch    Ethanol    Protime-INR    aPTT    aPTT    aPTT    NPO Diet NPO Type: Strict NPO    Undress & Gown    Notify Provider Platelet Count Less Than 36571    Notify Provider if PTT Not in Therapeutic Range After 24 Hours    Stop Infusion & Notify Provider if Bleeding Occurs    RN To Release aPTT Order 6 Hours After Heparin Bolus & 6 Hours After Any Heparin Rate Change    After 2 Consecutive Therapeutic aPTTs, Obtain aPTT Daily.  If a Rate Adjustment is Necessary, Resume Every 6 Hour aPTT Draws    IP General Consult (Use specialty-specific consult if known)    Hospitalist (on-call MD unless specified)    Insert Peripheral IV    CBC & Differential    Green Top (Gel)     Lavender Top    Gold Top - SST    Light Blue Top    CBC & Differential       Medications Given in the Emergency Department:  Medications   sodium chloride 0.9 % flush 10 mL (has no administration in time range)   heparin 95048 units/250 mL (100 units/mL) in 0.45 % NaCl infusion (18 Units/kg/hr × 56.1 kg Intravenous New Bag 7/10/25 1542)   heparin bolus from bag solution 2,800 Units (has no administration in time range)   heparin bolus from bag solution 1,400 Units (has no administration in time range)   morphine injection 4 mg (4 mg Intravenous Given 7/10/25 1339)   sodium chloride 0.9 % bolus 1,000 mL (0 mL Intravenous Stopped 7/10/25 1532)   ondansetron (ZOFRAN) injection 4 mg (4 mg Intravenous Given 7/10/25 1339)   iopamidol (ISOVUE-370) 76 % injection 100 mL (80 mL Intravenous Given 7/10/25 1406)   heparin bolus from bag solution 4,500 Units (4,500 Units Intravenous Bolus from Bag 7/10/25 1542)        ED Course:    ED Course as of 07/10/25 1605   Thu Jul 10, 2025   1513 Discussed case with Dr. Lovelace with vascular surgery.  He recommends due to patient's severe abdominal pain she was started on a heparin drip and admission to the hospitalist service. [RM]      ED Course User Index  [RM] Astrid Walters APRN       Labs:    Lab Results (last 24 hours)       Procedure Component Value Units Date/Time    Urinalysis With Microscopic If Indicated (No Culture) - Urine, Clean Catch [[id number]]  (Abnormal) Collected: 07/10/25 1243    Specimen: Urine, Clean Catch Updated: 07/10/25 1300     Color, UA Dark Yellow     Appearance, UA Cloudy     pH, UA 6.5     Specific Gravity, UA 1.019     Glucose, UA Negative     Ketones, UA 15 mg/dL (1+)     Bilirubin, UA Negative     Blood, UA Negative     Protein, UA Trace     Leuk Esterase, UA Moderate (2+)     Nitrite, UA Negative     Urobilinogen, UA 1.0 E.U./dL    Urinalysis, Microscopic Only - Urine, Clean Catch [[id number]]  (Abnormal) Collected: 07/10/25 1243    Specimen: Urine,  Clean Catch Updated: 07/10/25 1304     RBC, UA 0-2 /HPF      WBC, UA 11-20 /HPF      Bacteria, UA 4+ /HPF      Squamous Epithelial Cells, UA 13-20 /HPF      Hyaline Casts, UA 0-2 /LPF      Methodology Automated Microscopy    CBC & Differential [[id number]]  (Abnormal) Collected: 07/10/25 1244    Specimen: Blood Updated: 07/10/25 1257    Narrative:      The following orders were created for panel order CBC & Differential.  Procedure                               Abnormality         Status                     ---------                               -----------         ------                     CBC Auto Differential[[id number]]        Abnormal            Final result                 Please view results for these tests on the individual orders.    Comprehensive Metabolic Panel [[id number]]  (Abnormal) Collected: 07/10/25 1244    Specimen: Blood Updated: 07/10/25 1315     Glucose 90 mg/dL      BUN 6.7 mg/dL      Creatinine 0.72 mg/dL      Sodium 133 mmol/L      Potassium 4.1 mmol/L      Chloride 99 mmol/L      CO2 22.8 mmol/L      Calcium 10.7 mg/dL      Total Protein 7.1 g/dL      Albumin 4.5 g/dL      ALT (SGPT) 18 U/L      AST (SGOT) 23 U/L      Alkaline Phosphatase 120 U/L      Total Bilirubin 0.5 mg/dL      Globulin 2.6 gm/dL      A/G Ratio 1.7 g/dL      BUN/Creatinine Ratio 9.3     Anion Gap 11.2 mmol/L      eGFR 109.9 mL/min/1.73     Narrative:      GFR Categories in Chronic Kidney Disease (CKD)              GFR Category          GFR (mL/min/1.73)    Interpretation  G1                    90 or greater        Normal or high (1)  G2                    60-89                Mild decrease (1)  G3a                   45-59                Mild to moderate decrease  G3b                   30-44                Moderate to severe decrease  G4                    15-29                Severe decrease  G5                    14 or less           Kidney failure    (1)In the absence of evidence of kidney disease, neither GFR category  G1 or G2 fulfill the criteria for CKD.    eGFR calculation 2021 CKD-EPI creatinine equation, which does not include race as a factor    Lipase [[id number]]  (Abnormal) Collected: 07/10/25 1244    Specimen: Blood Updated: 07/10/25 1323     Lipase 573 U/L     hCG, Quantitative, Pregnancy [[id number]] Collected: 07/10/25 1244    Specimen: Blood Updated: 07/10/25 1313     HCG Quantitative <0.50 mIU/mL     Narrative:      HCG Ranges by Gestational Age    Females - non-pregnant premenopausal   </= 1mIU/mL HCG  Females - postmenopausal               </= 7mIU/mL HCG    3 Weeks       5.4   -      72 mIU/mL  4 Weeks      10.2   -     708 mIU/mL  5 Weeks       217   -   8,245 mIU/mL  6 Weeks       152   -  32,177 mIU/mL  7 Weeks     4,059   - 153,767 mIU/mL  8 Weeks    31,366   - 149,094 mIU/mL  9 Weeks    59,109   - 135,901 mIU/mL  10 Weeks   44,186   - 170,409 mIU/mL  12 Weeks   27,107   - 201,615 mIU/mL  14 Weeks   24,302   -  93,646 mIU/mL  15 Weeks   12,540   -  69,747 mIU/mL  16 Weeks    8,904   -  55,332 mIU/mL  17 Weeks    8,240   -  51,793 mIU/mL  18 Weeks    9,649   -  55,271 mIU/mL      CBC Auto Differential [[id number]]  (Abnormal) Collected: 07/10/25 1244    Specimen: Blood Updated: 07/10/25 1257     WBC 7.69 10*3/mm3      RBC 3.98 10*6/mm3      Hemoglobin 12.2 g/dL      Hematocrit 37.5 %      MCV 94.2 fL      MCH 30.7 pg      MCHC 32.5 g/dL      RDW 16.1 %      RDW-SD 55.8 fl      MPV 8.9 fL      Platelets 293 10*3/mm3      Neutrophil % 79.4 %      Lymphocyte % 12.4 %      Monocyte % 5.9 %      Eosinophil % 1.6 %      Basophil % 0.4 %      Immature Grans % 0.3 %      Neutrophils, Absolute 6.12 10*3/mm3      Lymphocytes, Absolute 0.95 10*3/mm3      Monocytes, Absolute 0.45 10*3/mm3      Eosinophils, Absolute 0.12 10*3/mm3      Basophils, Absolute 0.03 10*3/mm3      Immature Grans, Absolute 0.02 10*3/mm3      nRBC 0.0 /100 WBC     Ethanol [[id number]] Collected: 07/10/25 1244    Specimen: Blood Updated: 07/10/25 0300      Ethanol <10 mg/dL      Ethanol % <0.010 %     Narrative:      Not for legal purposes.    Protime-INR [[id number]]  (Abnormal) Collected: 07/10/25 1244    Specimen: Blood Updated: 07/10/25 1353     Protime 15.3 Seconds      INR 1.16    Narrative:      Suggested Therapeutic Ranges For Oral Anticoagulant Therapy:  Level of Therapy                      INR Target Range  Standard Dose                            2.0-3.0  High Dose                                2.5-3.5  Patients not receiving anticoagulant  Therapy Normal Range                     0.86-1.15    aPTT [[id number]]  (Abnormal) Collected: 07/10/25 1244    Specimen: Blood Updated: 07/10/25 1533     PTT 30.3 seconds              Imaging:    CT Abdomen Pelvis With Contrast  Result Date: 7/10/2025  CT ABDOMEN PELVIS W CONTRAST Date of Exam: 7/10/2025 2:02 PM EDT Indication: Abdominal pain, history of acute pancreatitis.. Comparison: 6/20/2025. Technique: Axial CT images were obtained of the abdomen and pelvis after the uneventful intravenous administration of iodinated contrast. Reconstructed coronal and sagittal images were also obtained. Automated exposure control and iterative construction methods were used. Findings: There are findings still compatible with acute pancreatitis. The pancreas is diffusely enlarged with peripancreatic inflammatory changes. The peripancreatic inflammatory changes have decreased from the prior study. There is no pancreatic pseudocyst, abscess, or necrosis. There has been interval development of a large filling defect in the main portal vein at the portal venous confluence consistent with partial acute/subacute venous thrombosis. There is also similar appearing partially occlusive thrombus involving in the proximal superior mesenteric vein extending into one of the branch vessels. There is thickening of the wall of the descending limb of the duodenal C-loop probably representing reactive duodenitis. There is hepatic steatosis. Both  liver and spleen are enlarged. The gallbladder, adrenal glands, and kidneys are normal. The uterus, adnexal structures, and urinary bladder are normal. There is a trace amount of free pelvic fluid. The appendix is normal. There is normal arterial enhancement. The lung bases are clear. There are no suspicious osteolytic or sclerotic lesions within the bony structures.     Impression: 1.There are findings still compatible with acute pancreatitis. There is no pancreatic pseudocyst, abscess, or necrosis. The degree of peripancreatic inflammatory change has decreased. 2.Interval development of partial acute/subacute venous thrombosis of the main portal vein at the portal venous confluence. There is also similar appearing partially occlusive thrombus involving proximal superior mesenteric vein extending into one of the  branch vessels. 3.Thickening of the wall of the descending limb of the duodenal C-loop probably representing reactive duodenitis. 4.Hepatic steatosis. 5.Hepatosplenomegaly. 6.Trace amount of free pelvic fluid. 7.The abnormal results were discussed with DAMIAN Ellis by telephone. Electronically Signed: Tyrese Roberson MD  7/10/2025 2:33 PM EDT  Workstation ID: [id number]        Differential Diagnosis and Discussion:    Abdominal Pain: Based on the patient's signs and symptoms, I considered abdominal aortic aneurysm, small bowel obstruction, pancreatitis, acute cholecystitis, acute appendecitis, peptic ulcer disease, gastritis, colitis, endocrine disorders, irritable bowel syndrome and other differential diagnosis an etiology of the patient's abdominal pain.    PROCEDURES:    Labs were collected in the emergency department and all labs were reviewed and interpreted by me.  CT scan was performed in the emergency department and the CT scan radiology impression was interpreted by me.    No orders to display       Procedures    MDM                     Patient Care Considerations:    ANTIBIOTICS: I considered  prescribing antibiotics as an outpatient however no bacterial focus of infection was found.      Consultants/Shared Management Plan:    Hospitalist: I have discussed the case with Dr. Soliz who agrees to accept the patient for admission.  SHARED VISIT: I have discussed the case with my supervising physician, Dr. Foster who states he recommends consulting with vascular surgery. The substantive portion of the medical decision was made by the attesting physician who made or approve the management plan and will take responsibility for the patient.  Clinical findings were discussed and ultimate disposition was made in consult with supervising physician.  Consultant: I have discussed the case with Dr. Lovelace with vascular surgery who states due to patient's severe abdominal pain he recommends she be started on a heparin drip and admitted to hospitalist service.  He states if she did not have abdominal pain she would be appropriate for starting Eliquis and discharged home with outpatient follow-up.    Social Determinants of Health:    Patient is independent, reliable, and has access to care.       Disposition and Care Coordination:    Admit:   Through independent evaluation of the patient's history, physical, and imperical data, the patient meets criteria for inpatient admission to the hospital.        Final diagnoses:   Alcohol-induced acute pancreatitis, unspecified complication status   Portal vein thrombosis        ED Disposition       ED Disposition   Decision to Admit    Condition   --    Comment   --               This medical record created using voice recognition software.             Astrid Walters, DAMIAN  07/10/25 4021

## 2025-07-10 NOTE — NURSING NOTE
Two nurse skin assessment on admission with Gaby Cadet RN. No pressure injuries or redness noted. Skin is warm and no discoloration. Noted tattoos scattered throughout.

## 2025-07-10 NOTE — ED PROVIDER NOTES
"SHARED VISIT ATTESTATION:    This visit was performed by myself and an APC.  I personally approved the management plan/medical decision making and take responsibility for the patient management.      SHARED VISIT NOTE:    Patient is 40 y.o. year old female that presents to the ED for evaluation of abdominal pain, nausea and vomiting.  The patient is a previous admission for pancreatitis after which she left AMA.  She states that symptoms of worsened today.  She has had no fever..         ED Course:    /72 (BP Location: Right arm, Patient Position: Sitting)   Pulse 80   Temp 97.7 °F (36.5 °C) (Oral)   Resp 18   Ht 172.7 cm (68\")   Wt 56.9 kg (125 lb 7.1 oz)   SpO2 100%   BMI 19.07 kg/m²       The following orders were placed and all results were independently analyzed by me:  Orders Placed This Encounter   Procedures    CT Abdomen Pelvis With Contrast    [place] Draw    Comprehensive Metabolic Panel    Lipase    Urinalysis With Microscopic If Indicated (No Culture) - Urine, Clean Catch    hCG, Quantitative, Pregnancy    CBC Auto Differential    Urinalysis, Microscopic Only - Urine, Clean Catch    Ethanol    Protime-INR    aPTT    aPTT    aPTT    Basic Metabolic Panel    Magnesium    Phosphorus    Triglycerides    aPTT    Diet: Regular/House; Texture: Regular (IDDSI 7); Fluid Consistency: Thin (IDDSI 0)    Undress & Gown    Notify Provider Platelet Count Less Than [id number]    Notify Provider if PTT Not in Therapeutic Range After 24 Hours    Stop Infusion & Notify Provider if Bleeding Occurs    RN To Release aPTT Order 6 Hours After Heparin Bolus & 6 Hours After Any Heparin Rate Change    After 2 Consecutive Therapeutic aPTTs, Obtain aPTT Daily.  If a Rate Adjustment is Necessary, Resume Every 6 Hour aPTT Draws    Vital Signs    Intake & Output    Weigh Patient    Oral Care    Saline Lock & Maintain IV Access    Place Sequential Compression Device    Maintain Sequential Compression Device    Code Status and " Medical Interventions: CPR (Attempt to Resuscitate); Full Support    IP General Consult (Use specialty-specific consult if known)    Hospitalist (on-call MD unless specified)    Inpatient Case Management  Consult    Insert Peripheral IV    Insert Peripheral IV    Initiate Observation Status    CBC & Differential    Green Top (Gel)    Lavender Top    Gold Top - SST    Light Blue Top    CBC & Differential    CBC & Differential       Medications Given in the Emergency Department:  Medications   sodium chloride 0.9 % flush 10 mL (has no administration in time range)   heparin 14406 units/250 mL (100 units/mL) in 0.45 % NaCl infusion (18 Units/kg/hr × 56.1 kg Intravenous Currently Infusing 7/10/25 1645)   heparin bolus from bag solution 2,800 Units (has no administration in time range)   heparin bolus from bag solution 1,400 Units (has no administration in time range)   lactated ringers infusion (has no administration in time range)   HYDROmorphone (DILAUDID) injection 0.5 mg (has no administration in time range)   pantoprazole (PROTONIX) EC tablet 40 mg (has no administration in time range)   sodium chloride 0.9 % flush 10 mL (has no administration in time range)   sodium chloride 0.9 % flush 10 mL (has no administration in time range)   sodium chloride 0.9 % infusion 40 mL (has no administration in time range)   ondansetron ODT (ZOFRAN-ODT) disintegrating tablet 4 mg (has no administration in time range)     Or   ondansetron (ZOFRAN) injection 4 mg (has no administration in time range)   melatonin tablet 5 mg (has no administration in time range)   sennosides-docusate (PERICOLACE) 8.6-50 MG per tablet 2 tablet (has no administration in time range)     And   polyethylene glycol (MIRALAX) packet 17 g (has no administration in time range)     And   bisacodyl (DULCOLAX) EC tablet 5 mg (has no administration in time range)     And   bisacodyl (DULCOLAX) suppository 10 mg (has no administration in time range)    acetaminophen (TYLENOL) tablet 650 mg (has no administration in time range)     Or   acetaminophen (TYLENOL) 160 MG/5ML oral solution 650 mg (has no administration in time range)     Or   acetaminophen (TYLENOL) suppository 650 mg (has no administration in time range)   HYDROcodone-acetaminophen (NORCO) 5-325 MG per tablet 1 tablet (has no administration in time range)   oxyCODONE (ROXICODONE) immediate release tablet 5 mg (has no administration in time range)   sodium chloride 0.9 % bolus 1,000 mL (has no administration in time range)   albuterol (PROVENTIL) nebulizer solution 0.083% 2.5 mg/3mL (has no administration in time range)   morphine injection 4 mg (4 mg Intravenous Given 7/10/25 1339)   sodium chloride 0.9 % bolus 1,000 mL (0 mL Intravenous Stopped 7/10/25 1532)   ondansetron (ZOFRAN) injection 4 mg (4 mg Intravenous Given 7/10/25 1339)   iopamidol (ISOVUE-370) 76 % injection 100 mL (80 mL Intravenous Given 7/10/25 1406)   heparin bolus from bag solution 4,500 Units (4,500 Units Intravenous Bolus from Bag 7/10/25 1542)        ED Course:    ED Course as of 07/10/25 1833   Thu Jul 10, 2025   1513 Discussed case with Dr. Lovelace with vascular surgery.  He recommends due to patient's severe abdominal pain she was started on a heparin drip and admission to the hospitalist service. [RM]      ED Course User Index  [RM] Astrid Walters APRN       Labs:    Lab Results (last 24 hours)       Procedure Component Value Units Date/Time    Urinalysis With Microscopic If Indicated (No Culture) - Urine, Clean Catch [[id number]]  (Abnormal) Collected: 07/10/25 1243    Specimen: Urine, Clean Catch Updated: 07/10/25 1300     Color, UA Dark Yellow     Appearance, UA Cloudy     pH, UA 6.5     Specific Gravity, UA 1.019     Glucose, UA Negative     Ketones, UA 15 mg/dL (1+)     Bilirubin, UA Negative     Blood, UA Negative     Protein, UA Trace     Leuk Esterase, UA Moderate (2+)     Nitrite, UA Negative     Urobilinogen, UA  1.0 E.U./dL    Urinalysis, Microscopic Only - Urine, Clean Catch [[id number]]  (Abnormal) Collected: 07/10/25 1243    Specimen: Urine, Clean Catch Updated: 07/10/25 1304     RBC, UA 0-2 /HPF      WBC, UA 11-20 /HPF      Bacteria, UA 4+ /HPF      Squamous Epithelial Cells, UA 13-20 /HPF      Hyaline Casts, UA 0-2 /LPF      Methodology Automated Microscopy    CBC & Differential [[id number]]  (Abnormal) Collected: 07/10/25 1244    Specimen: Blood Updated: 07/10/25 1257    Narrative:      The following orders were created for panel order CBC & Differential.  Procedure                               Abnormality         Status                     ---------                               -----------         ------                     CBC Auto Differential[[id number]]        Abnormal            Final result                 Please view results for these tests on the individual orders.    Comprehensive Metabolic Panel [[id number]]  (Abnormal) Collected: 07/10/25 1244    Specimen: Blood Updated: 07/10/25 1315     Glucose 90 mg/dL      BUN 6.7 mg/dL      Creatinine 0.72 mg/dL      Sodium 133 mmol/L      Potassium 4.1 mmol/L      Chloride 99 mmol/L      CO2 22.8 mmol/L      Calcium 10.7 mg/dL      Total Protein 7.1 g/dL      Albumin 4.5 g/dL      ALT (SGPT) 18 U/L      AST (SGOT) 23 U/L      Alkaline Phosphatase 120 U/L      Total Bilirubin 0.5 mg/dL      Globulin 2.6 gm/dL      A/G Ratio 1.7 g/dL      BUN/Creatinine Ratio 9.3     Anion Gap 11.2 mmol/L      eGFR 109.9 mL/min/1.73     Narrative:      GFR Categories in Chronic Kidney Disease (CKD)              GFR Category          GFR (mL/min/1.73)    Interpretation  G1                    90 or greater        Normal or high (1)  G2                    60-89                Mild decrease (1)  G3a                   45-59                Mild to moderate decrease  G3b                   30-44                Moderate to severe decrease  G4                    15-29                Severe  decrease  G5                    14 or less           Kidney failure    (1)In the absence of evidence of kidney disease, neither GFR category G1 or G2 fulfill the criteria for CKD.    eGFR calculation 2021 CKD-EPI creatinine equation, which does not include race as a factor    Lipase [[id number]]  (Abnormal) Collected: 07/10/25 1244    Specimen: Blood Updated: 07/10/25 1323     Lipase 573 U/L     hCG, Quantitative, Pregnancy [[id number]] Collected: 07/10/25 1244    Specimen: Blood Updated: 07/10/25 1313     HCG Quantitative <0.50 mIU/mL     Narrative:      HCG Ranges by Gestational Age    Females - non-pregnant premenopausal   </= 1mIU/mL HCG  Females - postmenopausal               </= 7mIU/mL HCG    3 Weeks       5.4   -      72 mIU/mL  4 Weeks      10.2   -     708 mIU/mL  5 Weeks       217   -   8,245 mIU/mL  6 Weeks       152   -  32,177 mIU/mL  7 Weeks     4,059   - 153,767 mIU/mL  8 Weeks    31,366   - 149,094 mIU/mL  9 Weeks    59,109   - 135,901 mIU/mL  10 Weeks   44,186   - 170,409 mIU/mL  12 Weeks   27,107   - 201,615 mIU/mL  14 Weeks   24,302   -  93,646 mIU/mL  15 Weeks   12,540   -  69,747 mIU/mL  16 Weeks    8,904   -  55,332 mIU/mL  17 Weeks    8,240   -  51,793 mIU/mL  18 Weeks    9,649   -  55,271 mIU/mL      CBC Auto Differential [[id number]]  (Abnormal) Collected: 07/10/25 1244    Specimen: Blood Updated: 07/10/25 1257     WBC 7.69 10*3/mm3      RBC 3.98 10*6/mm3      Hemoglobin 12.2 g/dL      Hematocrit 37.5 %      MCV 94.2 fL      MCH 30.7 pg      MCHC 32.5 g/dL      RDW 16.1 %      RDW-SD 55.8 fl      MPV 8.9 fL      Platelets 293 10*3/mm3      Neutrophil % 79.4 %      Lymphocyte % 12.4 %      Monocyte % 5.9 %      Eosinophil % 1.6 %      Basophil % 0.4 %      Immature Grans % 0.3 %      Neutrophils, Absolute 6.12 10*3/mm3      Lymphocytes, Absolute 0.95 10*3/mm3      Monocytes, Absolute 0.45 10*3/mm3      Eosinophils, Absolute 0.12 10*3/mm3      Basophils, Absolute 0.03 10*3/mm3      Immature  Grans, Absolute 0.02 10*3/mm3      nRBC 0.0 /100 WBC     Ethanol [[id number]] Collected: 07/10/25 1244    Specimen: Blood Updated: 07/10/25 1357     Ethanol <10 mg/dL      Ethanol % <0.010 %     Narrative:      Not for legal purposes.    Protime-INR [[id number]]  (Abnormal) Collected: 07/10/25 1244    Specimen: Blood Updated: 07/10/25 1353     Protime 15.3 Seconds      INR 1.16    Narrative:      Suggested Therapeutic Ranges For Oral Anticoagulant Therapy:  Level of Therapy                      INR Target Range  Standard Dose                            2.0-3.0  High Dose                                2.5-3.5  Patients not receiving anticoagulant  Therapy Normal Range                     0.86-1.15    aPTT [[id number]]  (Abnormal) Collected: 07/10/25 1244    Specimen: Blood Updated: 07/10/25 1533     PTT 30.3 seconds     Triglycerides [[id number]]  (Normal) Collected: 07/10/25 1244    Specimen: Blood Updated: 07/10/25 1755     Triglycerides 83 mg/dL              Imaging:    CT Abdomen Pelvis With Contrast  Result Date: 7/10/2025  CT ABDOMEN PELVIS W CONTRAST Date of Exam: 7/10/2025 2:02 PM EDT Indication: Abdominal pain, history of acute pancreatitis.. Comparison: 6/20/2025. Technique: Axial CT images were obtained of the abdomen and pelvis after the uneventful intravenous administration of iodinated contrast. Reconstructed coronal and sagittal images were also obtained. Automated exposure control and iterative construction methods were used. Findings: There are findings still compatible with acute pancreatitis. The pancreas is diffusely enlarged with peripancreatic inflammatory changes. The peripancreatic inflammatory changes have decreased from the prior study. There is no pancreatic pseudocyst, abscess, or necrosis. There has been interval development of a large filling defect in the main portal vein at the portal venous confluence consistent with partial acute/subacute venous thrombosis. There is also similar  appearing partially occlusive thrombus involving in the proximal superior mesenteric vein extending into one of the branch vessels. There is thickening of the wall of the descending limb of the duodenal C-loop probably representing reactive duodenitis. There is hepatic steatosis. Both liver and spleen are enlarged. The gallbladder, adrenal glands, and kidneys are normal. The uterus, adnexal structures, and urinary bladder are normal. There is a trace amount of free pelvic fluid. The appendix is normal. There is normal arterial enhancement. The lung bases are clear. There are no suspicious osteolytic or sclerotic lesions within the bony structures.     Impression: 1.There are findings still compatible with acute pancreatitis. There is no pancreatic pseudocyst, abscess, or necrosis. The degree of peripancreatic inflammatory change has decreased. 2.Interval development of partial acute/subacute venous thrombosis of the main portal vein at the portal venous confluence. There is also similar appearing partially occlusive thrombus involving proximal superior mesenteric vein extending into one of the  branch vessels. 3.Thickening of the wall of the descending limb of the duodenal C-loop probably representing reactive duodenitis. 4.Hepatic steatosis. 5.Hepatosplenomegaly. 6.Trace amount of free pelvic fluid. 7.The abnormal results were discussed with DAMIAN Ellis by telephone. Electronically Signed: Tyrese Roberson MD  7/10/2025 2:33 PM EDT  Workstation ID: [id number]      MDM:    Procedures    Labs were collected in the emergency department and all labs were reviewed and interpreted by me.  CT scan was performed in the emergency department and the CT scan radiology impression was interpreted by me.                     Burton Foster DO  18:33 EDT  07/10/25         Burton Foster DO  07/10/25 7855

## 2025-07-10 NOTE — LETTER
July 17, 2025     Patient: Aubree Krueger   YOB: 1985   Date of Visit: 7/10/2025       To Whom It May Concern:    It is my medical opinion that Aubree Krueger was hospitalized from 7/10/25 and discharged on 7/17/25. She may return to work with no restrictions on 7/19/25.           Sincerely,            LUIS FERNANDO Montanez RN

## 2025-07-11 LAB
ANION GAP SERPL CALCULATED.3IONS-SCNC: 6 MMOL/L (ref 5–15)
APTT PPP: 69.4 SECONDS (ref 78–95.9)
APTT PPP: 75.7 SECONDS (ref 78–95.9)
APTT PPP: 93.2 SECONDS (ref 78–95.9)
BACTERIA UR QL AUTO: ABNORMAL /HPF
BASOPHILS # BLD AUTO: 0.03 10*3/MM3 (ref 0–0.2)
BASOPHILS NFR BLD AUTO: 0.8 % (ref 0–1.5)
BILIRUB UR QL STRIP: NEGATIVE
BUN SERPL-MCNC: 4.8 MG/DL (ref 6–20)
BUN/CREAT SERPL: 6.2 (ref 7–25)
CALCIUM SPEC-SCNC: 9.6 MG/DL (ref 8.6–10.5)
CHLORIDE SERPL-SCNC: 110 MMOL/L (ref 98–107)
CLARITY UR: CLEAR
CO2 SERPL-SCNC: 23 MMOL/L (ref 22–29)
COLOR UR: YELLOW
CREAT SERPL-MCNC: 0.78 MG/DL (ref 0.57–1)
DEPRECATED RDW RBC AUTO: 58.4 FL (ref 37–54)
EGFRCR SERPLBLD CKD-EPI 2021: 99.8 ML/MIN/1.73
EOSINOPHIL # BLD AUTO: 0.16 10*3/MM3 (ref 0–0.4)
EOSINOPHIL NFR BLD AUTO: 4.1 % (ref 0.3–6.2)
ERYTHROCYTE [DISTWIDTH] IN BLOOD BY AUTOMATED COUNT: 16.2 % (ref 12.3–15.4)
FERRITIN SERPL-MCNC: 106.8 NG/ML (ref 13–150)
FOLATE SERPL-MCNC: 3.72 NG/ML (ref 4.78–24.2)
GLUCOSE SERPL-MCNC: 80 MG/DL (ref 65–99)
GLUCOSE UR STRIP-MCNC: NEGATIVE MG/DL
HAPTOGLOB SERPL-MCNC: 105 MG/DL (ref 30–200)
HCT VFR BLD AUTO: 31.7 % (ref 34–46.6)
HGB BLD-MCNC: 9.8 G/DL (ref 12–15.9)
HGB UR QL STRIP.AUTO: NEGATIVE
HYALINE CASTS UR QL AUTO: ABNORMAL /LPF
IMM GRANULOCYTES # BLD AUTO: 0.01 10*3/MM3 (ref 0–0.05)
IMM GRANULOCYTES NFR BLD AUTO: 0.3 % (ref 0–0.5)
IRON 24H UR-MRATE: 40 MCG/DL (ref 37–145)
IRON SATN MFR SERPL: 13 % (ref 20–50)
KETONES UR QL STRIP: NEGATIVE
LEUKOCYTE ESTERASE UR QL STRIP.AUTO: ABNORMAL
LIPASE SERPL-CCNC: 290 U/L (ref 13–60)
LYMPHOCYTES # BLD AUTO: 1.22 10*3/MM3 (ref 0.7–3.1)
LYMPHOCYTES NFR BLD AUTO: 31 % (ref 19.6–45.3)
MAGNESIUM SERPL-MCNC: 1.9 MG/DL (ref 1.6–2.6)
MCH RBC QN AUTO: 30.5 PG (ref 26.6–33)
MCHC RBC AUTO-ENTMCNC: 30.9 G/DL (ref 31.5–35.7)
MCV RBC AUTO: 98.8 FL (ref 79–97)
MONOCYTES # BLD AUTO: 0.34 10*3/MM3 (ref 0.1–0.9)
MONOCYTES NFR BLD AUTO: 8.6 % (ref 5–12)
NEUTROPHILS NFR BLD AUTO: 2.18 10*3/MM3 (ref 1.7–7)
NEUTROPHILS NFR BLD AUTO: 55.2 % (ref 42.7–76)
NITRITE UR QL STRIP: NEGATIVE
NRBC BLD AUTO-RTO: 0 /100 WBC (ref 0–0.2)
PH UR STRIP.AUTO: 6.5 [PH] (ref 5–8)
PHOSPHATE SERPL-MCNC: 2.9 MG/DL (ref 2.5–4.5)
PLATELET # BLD AUTO: 244 10*3/MM3 (ref 140–450)
PMV BLD AUTO: 9.5 FL (ref 6–12)
POTASSIUM SERPL-SCNC: 4.2 MMOL/L (ref 3.5–5.2)
PROT UR QL STRIP: NEGATIVE
RBC # BLD AUTO: 3.21 10*6/MM3 (ref 3.77–5.28)
RBC # UR STRIP: ABNORMAL /HPF
REF LAB TEST METHOD: ABNORMAL
RETICS # AUTO: 0.08 10*6/MM3 (ref 0.02–0.13)
RETICS/RBC NFR AUTO: 2.27 % (ref 0.7–1.9)
SODIUM SERPL-SCNC: 139 MMOL/L (ref 136–145)
SP GR UR STRIP: 1.01 (ref 1–1.03)
SQUAMOUS #/AREA URNS HPF: ABNORMAL /HPF
TIBC SERPL-MCNC: 317 MCG/DL (ref 298–536)
TRANSFERRIN SERPL-MCNC: 213 MG/DL (ref 200–360)
UROBILINOGEN UR QL STRIP: ABNORMAL
VIT B12 BLD-MCNC: 595 PG/ML (ref 211–946)
WBC # UR STRIP: ABNORMAL /HPF
WBC NRBC COR # BLD AUTO: 3.94 10*3/MM3 (ref 3.4–10.8)

## 2025-07-11 PROCEDURE — 83735 ASSAY OF MAGNESIUM: CPT | Performed by: HOSPITALIST

## 2025-07-11 PROCEDURE — 25010000002 KETOROLAC TROMETHAMINE PER 15 MG: Performed by: HOSPITALIST

## 2025-07-11 PROCEDURE — 25810000003 SODIUM CHLORIDE 0.9 % SOLUTION: Performed by: HOSPITALIST

## 2025-07-11 PROCEDURE — 85730 THROMBOPLASTIN TIME PARTIAL: CPT | Performed by: HOSPITALIST

## 2025-07-11 PROCEDURE — 81001 URINALYSIS AUTO W/SCOPE: CPT | Performed by: HOSPITALIST

## 2025-07-11 PROCEDURE — 25010000002 HYDROMORPHONE 1 MG/ML SOLUTION: Performed by: INTERNAL MEDICINE

## 2025-07-11 PROCEDURE — 85025 COMPLETE CBC W/AUTO DIFF WBC: CPT | Performed by: HOSPITALIST

## 2025-07-11 PROCEDURE — 25010000002 METHOCARBAMOL 1000 MG/10ML SOLUTION: Performed by: HOSPITALIST

## 2025-07-11 PROCEDURE — 85045 AUTOMATED RETICULOCYTE COUNT: CPT | Performed by: HOSPITALIST

## 2025-07-11 PROCEDURE — 25010000002 HEPARIN (PORCINE) 25000-0.45 UT/250ML-% SOLUTION

## 2025-07-11 PROCEDURE — 83690 ASSAY OF LIPASE: CPT | Performed by: HOSPITALIST

## 2025-07-11 PROCEDURE — 25810000003 LACTATED RINGERS PER 1000 ML: Performed by: HOSPITALIST

## 2025-07-11 PROCEDURE — 99254 IP/OBS CNSLTJ NEW/EST MOD 60: CPT | Performed by: SURGERY

## 2025-07-11 PROCEDURE — 99232 SBSQ HOSP IP/OBS MODERATE 35: CPT | Performed by: HOSPITALIST

## 2025-07-11 PROCEDURE — 87040 BLOOD CULTURE FOR BACTERIA: CPT | Performed by: HOSPITALIST

## 2025-07-11 PROCEDURE — 99254 IP/OBS CNSLTJ NEW/EST MOD 60: CPT | Performed by: INTERNAL MEDICINE

## 2025-07-11 PROCEDURE — 80048 BASIC METABOLIC PNL TOTAL CA: CPT | Performed by: HOSPITALIST

## 2025-07-11 PROCEDURE — 84466 ASSAY OF TRANSFERRIN: CPT | Performed by: HOSPITALIST

## 2025-07-11 PROCEDURE — 25010000002 HYDROMORPHONE 1 MG/ML SOLUTION: Performed by: HOSPITALIST

## 2025-07-11 PROCEDURE — 84100 ASSAY OF PHOSPHORUS: CPT | Performed by: HOSPITALIST

## 2025-07-11 PROCEDURE — 83010 ASSAY OF HAPTOGLOBIN QUANT: CPT | Performed by: HOSPITALIST

## 2025-07-11 PROCEDURE — 83540 ASSAY OF IRON: CPT | Performed by: HOSPITALIST

## 2025-07-11 PROCEDURE — 82728 ASSAY OF FERRITIN: CPT | Performed by: HOSPITALIST

## 2025-07-11 PROCEDURE — 25010000002 CEFTRIAXONE PER 250 MG: Performed by: HOSPITALIST

## 2025-07-11 RX ORDER — KETOROLAC TROMETHAMINE 30 MG/ML
30 INJECTION, SOLUTION INTRAMUSCULAR; INTRAVENOUS EVERY 6 HOURS PRN
Status: DISPENSED | OUTPATIENT
Start: 2025-07-11 | End: 2025-07-16

## 2025-07-11 RX ORDER — SODIUM CHLORIDE, SODIUM LACTATE, POTASSIUM CHLORIDE, CALCIUM CHLORIDE 600; 310; 30; 20 MG/100ML; MG/100ML; MG/100ML; MG/100ML
250 INJECTION, SOLUTION INTRAVENOUS CONTINUOUS
Status: ACTIVE | OUTPATIENT
Start: 2025-07-11 | End: 2025-07-12

## 2025-07-11 RX ORDER — METHOCARBAMOL 100 MG/ML
1000 INJECTION, SOLUTION INTRAMUSCULAR; INTRAVENOUS ONCE
Status: COMPLETED | OUTPATIENT
Start: 2025-07-11 | End: 2025-07-11

## 2025-07-11 RX ADMIN — HYDROMORPHONE HYDROCHLORIDE 0.5 MG: 1 INJECTION, SOLUTION INTRAMUSCULAR; INTRAVENOUS; SUBCUTANEOUS at 18:31

## 2025-07-11 RX ADMIN — Medication 10 ML: at 21:11

## 2025-07-11 RX ADMIN — HYDROMORPHONE HYDROCHLORIDE 0.5 MG: 1 INJECTION, SOLUTION INTRAMUSCULAR; INTRAVENOUS; SUBCUTANEOUS at 13:32

## 2025-07-11 RX ADMIN — SENNOSIDES, DOCUSATE SODIUM 2 TABLET: 50; 8.6 TABLET, FILM COATED ORAL at 08:06

## 2025-07-11 RX ADMIN — KETOROLAC TROMETHAMINE 30 MG: 30 INJECTION, SOLUTION INTRAMUSCULAR; INTRAVENOUS at 09:46

## 2025-07-11 RX ADMIN — OXYCODONE HYDROCHLORIDE 5 MG: 5 TABLET ORAL at 07:18

## 2025-07-11 RX ADMIN — Medication 5 MG: at 22:09

## 2025-07-11 RX ADMIN — CEFTRIAXONE SODIUM 1000 MG: 1 INJECTION, POWDER, FOR SOLUTION INTRAMUSCULAR; INTRAVENOUS at 13:52

## 2025-07-11 RX ADMIN — OXYCODONE HYDROCHLORIDE 5 MG: 5 TABLET ORAL at 01:15

## 2025-07-11 RX ADMIN — SODIUM CHLORIDE 500 ML: 9 INJECTION, SOLUTION INTRAVENOUS at 09:46

## 2025-07-11 RX ADMIN — HYDROMORPHONE HYDROCHLORIDE 0.5 MG: 1 INJECTION, SOLUTION INTRAMUSCULAR; INTRAVENOUS; SUBCUTANEOUS at 22:09

## 2025-07-11 RX ADMIN — HYDROMORPHONE HYDROCHLORIDE 0.5 MG: 1 INJECTION, SOLUTION INTRAMUSCULAR; INTRAVENOUS; SUBCUTANEOUS at 03:49

## 2025-07-11 RX ADMIN — SENNOSIDES, DOCUSATE SODIUM 2 TABLET: 50; 8.6 TABLET, FILM COATED ORAL at 21:11

## 2025-07-11 RX ADMIN — SODIUM CHLORIDE, POTASSIUM CHLORIDE, SODIUM LACTATE AND CALCIUM CHLORIDE 250 ML/HR: 600; 310; 30; 20 INJECTION, SOLUTION INTRAVENOUS at 22:13

## 2025-07-11 RX ADMIN — METHOCARBAMOL 1000 MG: 100 INJECTION INTRAMUSCULAR; INTRAVENOUS at 09:46

## 2025-07-11 RX ADMIN — PANTOPRAZOLE SODIUM 40 MG: 40 TABLET, DELAYED RELEASE ORAL at 03:49

## 2025-07-11 RX ADMIN — SODIUM CHLORIDE, POTASSIUM CHLORIDE, SODIUM LACTATE AND CALCIUM CHLORIDE 250 ML/HR: 600; 310; 30; 20 INJECTION, SOLUTION INTRAVENOUS at 13:56

## 2025-07-11 RX ADMIN — BISACODYL 5 MG: 5 TABLET, COATED ORAL at 16:23

## 2025-07-11 RX ADMIN — SODIUM CHLORIDE, POTASSIUM CHLORIDE, SODIUM LACTATE AND CALCIUM CHLORIDE 250 ML/HR: 600; 310; 30; 20 INJECTION, SOLUTION INTRAVENOUS at 18:32

## 2025-07-11 RX ADMIN — SODIUM CHLORIDE, POTASSIUM CHLORIDE, SODIUM LACTATE AND CALCIUM CHLORIDE 150 ML/HR: 600; 310; 30; 20 INJECTION, SOLUTION INTRAVENOUS at 01:12

## 2025-07-11 RX ADMIN — SODIUM CHLORIDE, POTASSIUM CHLORIDE, SODIUM LACTATE AND CALCIUM CHLORIDE 250 ML/HR: 600; 310; 30; 20 INJECTION, SOLUTION INTRAVENOUS at 12:01

## 2025-07-11 RX ADMIN — SODIUM CHLORIDE, POTASSIUM CHLORIDE, SODIUM LACTATE AND CALCIUM CHLORIDE 150 ML/HR: 600; 310; 30; 20 INJECTION, SOLUTION INTRAVENOUS at 08:05

## 2025-07-11 RX ADMIN — HEPARIN SODIUM 18 UNITS/KG/HR: 10000 INJECTION, SOLUTION INTRAVENOUS at 03:54

## 2025-07-11 NOTE — CONSULTS
[facility]  Initial Inpatient Consult Note    Referring Provider: Hospitalist    Reason for Consultation: Abdominal pain, pancreatitis    Subjective     History of present illness:    40 y.o. female with a past medical history of hepatitis C, depression, and substance abuse presented to the ED for evaluation of abdominal pain.  Patient was admitted to the hospital from 06/21/2025-06/25/2025-she left AMA.  She presented back to the ED due to continuing to have abdominal pain and nausea.  Patient states the pain never really went away from last discharge.  She is no longer having any nausea or vomiting, but is still having abdominal pain that she describes as a burning sensation in her epigastric area.  She also has had a drop in her hemoglobin from 12.2-9.8.  She also mentioned that her last BM was yesterday morning and was darker in color, almost black.  Patient is tolerating regular diet currently.  Patient has not had any alcohol in 15 days-for about 1 year patient admits to drinking bourbon daily-1 pint.  Patient denies nausea, vomiting, hematemesis, BRBPR, NSAID use, and fevers.    07/10/2025 CT Abdomen/Pelvis  1.There are findings still compatible with acute pancreatitis. There is no pancreatic pseudocyst, abscess, or necrosis. The degree of peripancreatic inflammatory change has decreased.  2.Interval development of partial acute/subacute venous thrombosis of the main portal vein at the portal venous confluence. There is also similar appearing partially occlusive thrombus involving proximal superior mesenteric vein extending into one of the   branch vessels.  3.Thickening of the wall of the descending limb of the duodenal C-loop probably representing reactive duodenitis.  4.Hepatic steatosis.  5.Hepatosplenomegaly.  6.Trace amount of free pelvic fluid.    Past Medical History:  Past Medical History:   Diagnosis Date    Anemia 2003    1st pregnancy    Bipolar disorder 2000    Depression      Hepatitis C     finished tx 2021    PONV (postoperative nausea and vomiting) 2004    Substance abuse 2007    Varicella      Past Surgical History:  Past Surgical History:   Procedure Laterality Date    KNEE SURGERY Left     2 surgeries    WISDOM TOOTH EXTRACTION  2007      Social History:   Social History     Tobacco Use    Smoking status: Heavy Smoker     Current packs/day: 0.50     Average packs/day: 0.5 packs/day for 20.5 years (10.3 ttl pk-yrs)     Types: Cigarettes     Start date: 2005     Passive exposure: Current    Smokeless tobacco: Never    Tobacco comments:     Workin on quittting   Substance Use Topics    Alcohol use: Not Currently      Family History:  Family History   Problem Relation Age of Onset    Breast cancer Paternal Aunt     Breast cancer Paternal Aunt        Home Meds:  Medications Prior to Admission   Medication Sig Dispense Refill Last Dose/Taking    albuterol sulfate  (90 Base) MCG/ACT inhaler Inhale 2 puffs Every 4 (Four) Hours As Needed for Wheezing or Shortness of Air.   Taking As Needed     Current Meds:   cefTRIAXone, 1,000 mg, Intravenous, Q24H  melatonin, 5 mg, Oral, Nightly  nicotine, 1 patch, Transdermal, Q24H  pantoprazole, 40 mg, Oral, Q AM  sodium chloride, 10 mL, Intravenous, Q12H      Allergies:  Allergies   Allergen Reactions    Sulfa Antibiotics Anaphylaxis     Review of Systems  Pertinent items are noted in HPI     Objective     Vital Signs  Temp:  [97.3 °F (36.3 °C)-97.9 °F (36.6 °C)] 97.9 °F (36.6 °C)  Heart Rate:  [53-80] 60  Resp:  [17-19] 18  BP: ()/(50-96) 107/64  Physical Exam:  General Appearance:    Alert, cooperative, in no acute distress   Head:    Normocephalic, hair dyed red, without obvious abnormality, atraumatic   Eyes:          conjunctivae and sclerae normal, no icterus   Throat:   no thrush, oral mucosa moist   Neck:   Supple, no adenopathy   Lungs:     Unlabored breathing     Heart:    Regular rhythm and normal rate    Chest Wall:    No  abnormalities observed   Abdomen:     Soft, non distended, tender in epigastric area   Extremities:   no edema, no redness   Skin:   No bruising or rash, multiple tattoos of upper extremities bilaterally   Psychiatric:  normal mood and insight     Results Review:   I reviewed the patient's new clinical results.    Results from last 7 days   Lab Units 07/11/25  0456 07/10/25  1244   WBC 10*3/mm3 3.94 7.69   HEMOGLOBIN g/dL 9.8* 12.2   HEMATOCRIT % 31.7* 37.5   PLATELETS 10*3/mm3 244 293     Results from last 7 days   Lab Units 07/11/25  0456 07/10/25  1244   SODIUM mmol/L 139 133*   POTASSIUM mmol/L 4.2 4.1   CHLORIDE mmol/L 110* 99   CO2 mmol/L 23.0 22.8   BUN mg/dL 4.8* 6.7   CREATININE mg/dL 0.78 0.72   CALCIUM mg/dL 9.6 10.7*   BILIRUBIN mg/dL  --  0.5   ALK PHOS U/L  --  120*   ALT (SGPT) U/L  --  18   AST (SGOT) U/L  --  23   GLUCOSE mg/dL 80 90     Results from last 7 days   Lab Units 07/10/25  1244   INR  1.16*     Lab Results   Lab Value Date/Time    LIPASE 290 (H) 07/11/2025 0456    LIPASE 573 (H) 07/10/2025 1244    LIPASE 1,006 (H) 06/21/2025 0453    LIPASE 676 (H) 06/20/2025 0916    LIPASE 20 05/26/2024 1226    LIPASE 32 03/29/2024 1850    LIPASE 28 10/18/2022 2233    LIPASE 67 (H) 01/14/2019 1348       Radiology:  CT Abdomen Pelvis With Contrast  Result Date: 7/10/2025  Impression: 1.There are findings still compatible with acute pancreatitis. There is no pancreatic pseudocyst, abscess, or necrosis. The degree of peripancreatic inflammatory change has decreased. 2.Interval development of partial acute/subacute venous thrombosis of the main portal vein at the portal venous confluence. There is also similar appearing partially occlusive thrombus involving proximal superior mesenteric vein extending into one of the  branch vessels. 3.Thickening of the wall of the descending limb of the duodenal C-loop probably representing reactive duodenitis. 4.Hepatic steatosis. 5.Hepatosplenomegaly. 6.Trace amount of  free pelvic fluid. 7.The abnormal results were discussed with DAMIAN Ellis by telephone. Electronically Signed: Tyrese Roberson MD  7/10/2025 2:33 PM EDT  Workstation ID: [id number]       Assessment & Plan     Pancreatitis       Assessment:  Pancreatitis  Abdominal pain      Plan:  Continue IV fluids, pain control and bowel rest.  Advance diet as tolerated  Continue fluids, regular diet and pain medications for pancreatitis  Discussed importance of not drinking alcohol-Patient will need to follow up outpatient in office   Patient understands and agrees to the plan      I discussed the patients findings and my recommendations with patient and nursing staff.    Electronically signed by DAMIAN Morocho, 07/11/25, 2:04 PM EDT.  Attending Attestation  I reviewed the below documentation and evaluation and discussed the care plan with DAMIAN Morocho, I agree with her findings and plan as documented.  New portal vein thrombosis and started with IV heparin as well as thrombus involving the superior mesenteric vein.  Encourage ambulation  Suspect drop in hemoglobin is delutional  Counseled to avoid alcohol and other substances including marijuana    Electronically signed by Lenard Weller MD, 07/11/25, 3:00 PM EDT.    Portions of this documentation were transcribed electronically from a voice recognition software.  I confirm all data accurately represents the service(s) I performed at today's visit.

## 2025-07-11 NOTE — PLAN OF CARE
Goal Outcome Evaluation:  Plan of Care Reviewed With: patient        Progress: no change  Outcome Evaluation: Patient alert and oriented x4 rested in bed and up ad linda walking in hallway throughout the day with minimal complaints of pain, although pain is obvious by her facial expressions. Continuous fluids given per order. Fluid bolus given per order. BP monitored throughout the day. Given dilaudid 1x for pain when BP got higher, but toradol given also when BP was soft. seen by GI doctor and vascular surgeon. No plans on surgical interventions from either discipline. Vasular states plans are to DC heparin gtt and to transition patient to PO clotting medication and to maintain this medication for a few months at home. Nicotine patch refused by patient. IV access x3 for patient's antibiotics, fluids and heparin gtt. Patient visited by family who was very supportive. Patient had BM yesterday but states she normally goes every single day so stool softener and laxative given per request and patient walked many laps around the unit and drank coffee to try to get her bowels moving. Continue plan of care, no concerns.

## 2025-07-11 NOTE — CONSULTS
Vascular Surgery Consult Note    Chief complaint abdominal pain    Reason for consultation: Portal and superior mesenteric vein thrombosis  Consult requested by: Dr. Soliz    HPI: This is a 40-year-old woman who is readmitted with pancreatitis and significant abdominal pain.  Patient has a history of polysubstance abuse and alcohol abuse.  She developed pancreatitis and was recently admitted to the facility.  She is readmitted for persistent abdominal pain.  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  Vascular surgery was asked to evaluate the patient.  Currently patient is feeling better than she did several days ago.  She continues to complain of persistent abdominal discomfort.  She is tolerating a diet.  She has not had any nausea or vomiting.  She has had soft bowel movements.  No prior history of pancreatitis.  No personal or family history of arterial or venous thrombosis.    Review of Systems  Review of Systems   Constitutional:  Positive for activity change, appetite change and fatigue.   HENT: Negative.     Eyes: Negative.    Respiratory: Negative.     Cardiovascular: Negative.    Gastrointestinal:  Positive for abdominal pain.   Endocrine: Negative.    Genitourinary: Negative.    Musculoskeletal: Negative.    Skin: Negative.    Allergic/Immunologic: Negative.    Neurological: Negative.    Hematological: Negative.    Psychiatric/Behavioral: Negative.           History  Past Medical History:   Diagnosis Date    Anemia 2003    1st pregnancy    Bipolar disorder 2000    Depression     Hepatitis C     finished tx 2021    PONV (postoperative nausea and vomiting) 2004    Substance abuse 2007    Varicella      Past Surgical History:   Procedure Laterality Date    KNEE SURGERY Left     2 surgeries    WISDOM TOOTH EXTRACTION  2007     Family History   Problem Relation Age of Onset    Breast cancer Paternal Aunt     Breast cancer  Paternal Aunt      Social History     Tobacco Use    Smoking status: Heavy Smoker     Current packs/day: 0.50     Average packs/day: 0.5 packs/day for 20.5 years (10.3 ttl pk-yrs)     Types: Cigarettes     Start date: 2005     Passive exposure: Current    Smokeless tobacco: Never    Tobacco comments:     Workin on quittting   Vaping Use    Vaping status: Former    Quit date: 6/13/2022    Substances: Nicotine   Substance Use Topics    Alcohol use: Not Currently    Drug use: Not Currently     Frequency: 3.0 times per week     Types: Marijuana     Comment: current     Medications Prior to Admission   Medication Sig Dispense Refill Last Dose/Taking    albuterol sulfate  (90 Base) MCG/ACT inhaler Inhale 2 puffs Every 4 (Four) Hours As Needed for Wheezing or Shortness of Air.   Taking As Needed     Allergies:  Sulfa antibiotics    Vital Signs  Temp:  [97.3 °F (36.3 °C)-97.9 °F (36.6 °C)] 97.7 °F (36.5 °C)  Heart Rate:  [] 104  Resp:  [18] 18  BP: ()/(62-72) 99/62    Physical Exam:  Vitals:    07/11/25 0849 07/11/25 1101 07/11/25 1318 07/11/25 1504   BP: 96/63  Comment: RN informed 99/65  Comment: RN informed 107/64 99/62  Comment: RN informed   BP Location: Right arm Right arm Right arm Left arm   Patient Position: Lying Lying Lying Sitting   Pulse: 58 56 60 104   Resp:  18  18   Temp:  97.9 °F (36.6 °C)  97.7 °F (36.5 °C)   TempSrc:  Oral  Oral   SpO2:  99%  99%   Weight:       Height:          Body mass index is 19.07 kg/m².    Physical Exam  Constitutional:       Appearance: Normal appearance.   HENT:      Head: Normocephalic and atraumatic.      Nose: Nose normal.      Mouth/Throat:      Mouth: Mucous membranes are moist.      Pharynx: Oropharynx is clear.   Eyes:      Extraocular Movements: Extraocular movements intact.      Conjunctiva/sclera: Conjunctivae normal.      Pupils: Pupils are equal, round, and reactive to light.   Cardiovascular:      Rate and Rhythm: Normal rate and regular rhythm.       Pulses: Normal pulses.      Heart sounds: Normal heart sounds.   Pulmonary:      Effort: Pulmonary effort is normal.      Breath sounds: Normal breath sounds.   Abdominal:      Comments: Abdomen is softly distended, tender to deep palpation, no peritonitis   Musculoskeletal:         General: Normal range of motion.      Cervical back: Normal range of motion and neck supple.   Skin:     General: Skin is warm and dry.      Capillary Refill: Capillary refill takes less than 2 seconds.   Neurological:      General: No focal deficit present.      Mental Status: She is alert and oriented to person, place, and time. Mental status is at baseline.   Psychiatric:         Mood and Affect: Mood normal.         Behavior: Behavior normal.         Thought Content: Thought content normal.         Judgment: Judgment normal.           Results Review:    I reviewed the patient's new clinical results.    Assessment and Plan: This is a 40-year-old woman with what appears to be provoked thrombosis of the portal and superior mesenteric vein likely related to the episode of pancreatitis.  This is a reversible event and anticoagulation for 3 months should be adequate.  Patient can be transition to oral anticoagulation with Eliquis 5 mg twice daily for the next 3 months.  She can follow-up with her primary care provider.  No particular vascular surgery follow-up necessary.  I will sign off.    I discussed the patients findings and my recommendations with patient.       Rosales Mendez MD  07/11/25  17:53 EDT

## 2025-07-11 NOTE — PROGRESS NOTES
[facility]   Hospitalist Progress Note  Date: 2025  Patient Name: Aubree Krueger  : 1985  MRN: [id number]  Date of admission: 7/10/2025  Room/Bed: [facility]      Subjective   Subjective   Chief Complaint: abdominal pain     Summary:  Aubree Krueger is a 40 y.o. female who presents with severe abdominal pain, nausea, vomiting.  Patient was recently diagnosed with pancreatitis and signed out AMA.  She is unable to tolerate food without severe abdominal pain.  Patient states that she has not drank alcohol since prior hospitalization.     On arrival to the ED, patient temperature 98.4, pulse 87, respiratory rate of 18, blood pressure 91/59, and she saturating 100% on room air.     Findings of CT abdomen and pelvis: There are findings still compatible with acute pancreatitis.  There is no pancreatic pseudocyst abscess or necrosis.  The degree of peripancreatic inflammatory change is decreased.  Interval development of partial acute/subacute venous thrombosis of the main portal vein at the portal venous confluence.  There is also similarly appearing occlusive thrombus involving the proximal superior mesenteric vein extending into the branch vessels.  Thickening of the wall of the descending limb of the duodenal C-loop probably representing reactive duodenitis.  Hepatic steatosis.  Hepatosplenomegaly.  Trace amount of free fluid.    Interval Followup: Patient continues to experience abdominal tenderness.  Blood pressure is on the lower side.  Improved with IV fluids.    Review of Systems    All systems reviewed and negative except for what is outlined above.      Objective   Objective     Vitals:   Temp:  [97.3 °F (36.3 °C)-98.4 °F (36.9 °C)] 97.9 °F (36.6 °C)  Heart Rate:  [53-87] 56  Resp:  [17-19] 18  BP: ()/(50-96) 99/65    Physical Exam   General: Awake, alert, NAD  HENT: NCAT, MMM  Eyes: pupils equal, no scleral icterus  Cardiovascular: RRR, no murmurs   Pulmonary: CTA bilaterally; no wheezes;  no conversational dyspnea  Gastrointestinal: S/ND/NT, +BS  Musculoskeletal: No gross deformities  Skin: No jaundice, no rash on exposed skin appreciated  Neuro: CN II through XII grossly intact; speech clear; no tremor  Psych: Mood and affect appropriate  : No Merchant catheter; no suprapubic tenderness    Result Review    Result Review:  I have personally reviewed these results:  [x]  Laboratory      Lab 07/11/25  0456 07/10/25  2139 07/10/25  1244   WBC 3.94  --  7.69   HEMOGLOBIN 9.8*  --  12.2   HEMATOCRIT 31.7*  --  37.5   PLATELETS 244  --  293   NEUTROS ABS 2.18  --  6.12   IMMATURE GRANS (ABS) 0.01  --  0.02   LYMPHS ABS 1.22  --  0.95   MONOS ABS 0.34  --  0.45   EOS ABS 0.16  --  0.12   MCV 98.8*  --  94.2   PROTIME  --   --  15.3*   APTT 69.4* 83.4 30.3*         Lab 07/11/25  0456 07/10/25  1244   SODIUM 139 133*   POTASSIUM 4.2 4.1   CHLORIDE 110* 99   CO2 23.0 22.8   ANION GAP 6.0 11.2   BUN 4.8* 6.7   CREATININE 0.78 0.72   EGFR 99.8 109.9   GLUCOSE 80 90   CALCIUM 9.6 10.7*   MAGNESIUM 1.9 2.1   PHOSPHORUS 2.9 3.1         Lab 07/10/25  1244   TOTAL PROTEIN 7.1   ALBUMIN 4.5   GLOBULIN 2.6   ALT (SGPT) 18   AST (SGOT) 23   BILIRUBIN 0.5   ALK PHOS 120*   LIPASE 573*         Lab 07/10/25  1244   PROTIME 15.3*   INR 1.16*         Lab 07/10/25  1244   TRIGLYCERIDES 83             Brief Urine Lab Results  (Last result in the past 365 days)        Color   Clarity   Blood   Leuk Est   Nitrite   Protein   CREAT   Urine HCG        07/10/25 1243 Dark Yellow   Cloudy   Negative   Moderate (2+)   Negative   Trace                 [x]  Microbiology   Microbiology Results (last 10 days)       ** No results found for the last 240 hours. **          [x]  Radiology  CT Abdomen Pelvis With Contrast  Result Date: 7/10/2025  Impression: 1.There are findings still compatible with acute pancreatitis. There is no pancreatic pseudocyst, abscess, or necrosis. The degree of peripancreatic inflammatory change has decreased.  2.Interval development of partial acute/subacute venous thrombosis of the main portal vein at the portal venous confluence. There is also similar appearing partially occlusive thrombus involving proximal superior mesenteric vein extending into one of the  branch vessels. 3.Thickening of the wall of the descending limb of the duodenal C-loop probably representing reactive duodenitis. 4.Hepatic steatosis. 5.Hepatosplenomegaly. 6.Trace amount of free pelvic fluid. 7.The abnormal results were discussed with DAMIAN Ellis by telephone. Electronically Signed: Tyrese Roberson MD  7/10/2025 2:33 PM EDT  Workstation ID: [id number]    [x]  EKG/Telemetry   [x]  Cardiology/Vascular   [x]  Pathology  [x]  Old records  [x]  Other:    Assessment & Plan   Assessment / Plan   #1 pancreatitis-  -IV fluids, pain medication, antiemetics.  -Will check triglycerides.  -Patient states that she has abstained from alcohol since last admission.  - Patient reports worse pain.  Therefore, I will consult GI.  Dr. Weller consulted.  Will check lipase.  If worse, will get CT abdomen.     #2 portal vein thrombosis probably secondary to inflammation  -Vascular surgery recommended starting the patient on a heparin drip since she is symptomatic.     #3  Alcohol abuse  -Patient states she has not drank alcohol since last admission.     #4 bipolar disease  -Patient not on medication.     #5 hepatitis C which has been treated.     #6 nicotine dependence  -Nicotine patch ordered     #7 asthma  -Inhaler ordered     #8 asymptomatic UTI  - Will treat since patient has hypotension.  Started on Rocephin.  Will check blood cultures.    #9 anemia will monitor.  Could be secondary to hemodilution.  Will check anemia panel.    #10 hypotension IV fluid rate increased.       Discussed with RN.    VTE Prophylaxis:  Pharmacologic & mechanical VTE prophylaxis orders are present.        CODE STATUS:   Code Status (Patient has no pulse and is not breathing): CPR  (Attempt to Resuscitate)  Medical Interventions (Patient has pulse or is breathing): Full Support  Level Of Support Discussed With: Patient      Electronically signed by Katherine Soliz DO, 7/11/2025, 11:51 EDT.

## 2025-07-11 NOTE — PLAN OF CARE
Goal Outcome Evaluation:  Plan of Care Reviewed With: patient        Progress: no change  Outcome Evaluation: Alert and oriented X4. Heparin gtt running at 18 units/hr. PTT due to be drawn at 0457. LR running at 150mL/hr per order. Medicated for pain with PRN dilaudid and oxy per MAR. No needs or issues noted at this time.

## 2025-07-12 LAB
ANION GAP SERPL CALCULATED.3IONS-SCNC: 8.4 MMOL/L (ref 5–15)
APTT PPP: 145.8 SECONDS (ref 78–95.9)
APTT PPP: 33.7 SECONDS (ref 78–95.9)
BASOPHILS # BLD AUTO: 0.03 10*3/MM3 (ref 0–0.2)
BASOPHILS NFR BLD AUTO: 0.8 % (ref 0–1.5)
BUN SERPL-MCNC: 3.3 MG/DL (ref 6–20)
BUN/CREAT SERPL: 4.5 (ref 7–25)
CALCIUM SPEC-SCNC: 9.7 MG/DL (ref 8.6–10.5)
CHLORIDE SERPL-SCNC: 108 MMOL/L (ref 98–107)
CO2 SERPL-SCNC: 24.6 MMOL/L (ref 22–29)
CREAT SERPL-MCNC: 0.74 MG/DL (ref 0.57–1)
DEPRECATED RDW RBC AUTO: 58.8 FL (ref 37–54)
EGFRCR SERPLBLD CKD-EPI 2021: 106.4 ML/MIN/1.73
EOSINOPHIL # BLD AUTO: 0.11 10*3/MM3 (ref 0–0.4)
EOSINOPHIL NFR BLD AUTO: 2.8 % (ref 0.3–6.2)
ERYTHROCYTE [DISTWIDTH] IN BLOOD BY AUTOMATED COUNT: 16.2 % (ref 12.3–15.4)
FERRITIN SERPL-MCNC: 108.7 NG/ML (ref 13–150)
GLUCOSE SERPL-MCNC: 92 MG/DL (ref 65–99)
HCT VFR BLD AUTO: 33.6 % (ref 34–46.6)
HEMOCCULT STL QL IA: POSITIVE
HGB BLD-MCNC: 10.5 G/DL (ref 12–15.9)
IMM GRANULOCYTES # BLD AUTO: 0.02 10*3/MM3 (ref 0–0.05)
IMM GRANULOCYTES NFR BLD AUTO: 0.5 % (ref 0–0.5)
IRON 24H UR-MRATE: 40 MCG/DL (ref 37–145)
IRON SATN MFR SERPL: 11 % (ref 20–50)
LYMPHOCYTES # BLD AUTO: 0.86 10*3/MM3 (ref 0.7–3.1)
LYMPHOCYTES NFR BLD AUTO: 21.9 % (ref 19.6–45.3)
MAGNESIUM SERPL-MCNC: 1.8 MG/DL (ref 1.6–2.6)
MCH RBC QN AUTO: 30.8 PG (ref 26.6–33)
MCHC RBC AUTO-ENTMCNC: 31.3 G/DL (ref 31.5–35.7)
MCV RBC AUTO: 98.5 FL (ref 79–97)
MONOCYTES # BLD AUTO: 0.2 10*3/MM3 (ref 0.1–0.9)
MONOCYTES NFR BLD AUTO: 5.1 % (ref 5–12)
NEUTROPHILS NFR BLD AUTO: 2.7 10*3/MM3 (ref 1.7–7)
NEUTROPHILS NFR BLD AUTO: 68.9 % (ref 42.7–76)
NRBC BLD AUTO-RTO: 0 /100 WBC (ref 0–0.2)
PHOSPHATE SERPL-MCNC: 3 MG/DL (ref 2.5–4.5)
PLATELET # BLD AUTO: 260 10*3/MM3 (ref 140–450)
PMV BLD AUTO: 8.9 FL (ref 6–12)
POTASSIUM SERPL-SCNC: 3.5 MMOL/L (ref 3.5–5.2)
RBC # BLD AUTO: 3.41 10*6/MM3 (ref 3.77–5.28)
SODIUM SERPL-SCNC: 141 MMOL/L (ref 136–145)
TIBC SERPL-MCNC: 361 MCG/DL (ref 298–536)
TRANSFERRIN SERPL-MCNC: 242 MG/DL (ref 200–360)
WBC NRBC COR # BLD AUTO: 3.92 10*3/MM3 (ref 3.4–10.8)

## 2025-07-12 PROCEDURE — 80048 BASIC METABOLIC PNL TOTAL CA: CPT | Performed by: HOSPITALIST

## 2025-07-12 PROCEDURE — 83540 ASSAY OF IRON: CPT | Performed by: HOSPITALIST

## 2025-07-12 PROCEDURE — 25010000002 CEFTRIAXONE PER 250 MG: Performed by: HOSPITALIST

## 2025-07-12 PROCEDURE — 85730 THROMBOPLASTIN TIME PARTIAL: CPT | Performed by: HOSPITALIST

## 2025-07-12 PROCEDURE — 84100 ASSAY OF PHOSPHORUS: CPT | Performed by: HOSPITALIST

## 2025-07-12 PROCEDURE — 99232 SBSQ HOSP IP/OBS MODERATE 35: CPT | Performed by: HOSPITALIST

## 2025-07-12 PROCEDURE — 84466 ASSAY OF TRANSFERRIN: CPT | Performed by: HOSPITALIST

## 2025-07-12 PROCEDURE — 25010000002 HEPARIN (PORCINE) 25000-0.45 UT/250ML-% SOLUTION

## 2025-07-12 PROCEDURE — 82274 ASSAY TEST FOR BLOOD FECAL: CPT | Performed by: HOSPITALIST

## 2025-07-12 PROCEDURE — 82728 ASSAY OF FERRITIN: CPT | Performed by: HOSPITALIST

## 2025-07-12 PROCEDURE — 25810000003 SODIUM CHLORIDE 0.9 % SOLUTION: Performed by: HOSPITALIST

## 2025-07-12 PROCEDURE — 83735 ASSAY OF MAGNESIUM: CPT | Performed by: HOSPITALIST

## 2025-07-12 PROCEDURE — 85025 COMPLETE CBC W/AUTO DIFF WBC: CPT | Performed by: HOSPITALIST

## 2025-07-12 PROCEDURE — 25010000002 HYDROMORPHONE 1 MG/ML SOLUTION: Performed by: HOSPITALIST

## 2025-07-12 PROCEDURE — 25810000003 LACTATED RINGERS PER 1000 ML: Performed by: HOSPITALIST

## 2025-07-12 PROCEDURE — 25010000002 NA FERRIC GLUC CPLX PER 12.5 MG: Performed by: HOSPITALIST

## 2025-07-12 RX ORDER — PANTOPRAZOLE SODIUM 40 MG/1
40 TABLET, DELAYED RELEASE ORAL
Status: DISCONTINUED | OUTPATIENT
Start: 2025-07-12 | End: 2025-07-14

## 2025-07-12 RX ADMIN — Medication 5 MG: at 20:27

## 2025-07-12 RX ADMIN — APIXABAN 5 MG: 5 TABLET, FILM COATED ORAL at 09:42

## 2025-07-12 RX ADMIN — APIXABAN 5 MG: 5 TABLET, FILM COATED ORAL at 20:27

## 2025-07-12 RX ADMIN — HYDROMORPHONE HYDROCHLORIDE 0.5 MG: 1 INJECTION, SOLUTION INTRAMUSCULAR; INTRAVENOUS; SUBCUTANEOUS at 11:03

## 2025-07-12 RX ADMIN — SODIUM CHLORIDE, POTASSIUM CHLORIDE, SODIUM LACTATE AND CALCIUM CHLORIDE 250 ML/HR: 600; 310; 30; 20 INJECTION, SOLUTION INTRAVENOUS at 11:03

## 2025-07-12 RX ADMIN — SODIUM CHLORIDE, POTASSIUM CHLORIDE, SODIUM LACTATE AND CALCIUM CHLORIDE 250 ML/HR: 600; 310; 30; 20 INJECTION, SOLUTION INTRAVENOUS at 06:59

## 2025-07-12 RX ADMIN — CEFTRIAXONE SODIUM 1000 MG: 1 INJECTION, POWDER, FOR SOLUTION INTRAMUSCULAR; INTRAVENOUS at 13:23

## 2025-07-12 RX ADMIN — SODIUM CHLORIDE 250 MG: 9 INJECTION, SOLUTION INTRAVENOUS at 14:09

## 2025-07-12 RX ADMIN — HYDROMORPHONE HYDROCHLORIDE 0.5 MG: 1 INJECTION, SOLUTION INTRAMUSCULAR; INTRAVENOUS; SUBCUTANEOUS at 17:32

## 2025-07-12 RX ADMIN — HYDROMORPHONE HYDROCHLORIDE 0.5 MG: 1 INJECTION, SOLUTION INTRAMUSCULAR; INTRAVENOUS; SUBCUTANEOUS at 02:42

## 2025-07-12 RX ADMIN — HYDROMORPHONE HYDROCHLORIDE 0.5 MG: 1 INJECTION, SOLUTION INTRAMUSCULAR; INTRAVENOUS; SUBCUTANEOUS at 06:59

## 2025-07-12 RX ADMIN — Medication 10 ML: at 20:27

## 2025-07-12 RX ADMIN — HYDROMORPHONE HYDROCHLORIDE 0.5 MG: 1 INJECTION, SOLUTION INTRAMUSCULAR; INTRAVENOUS; SUBCUTANEOUS at 20:27

## 2025-07-12 RX ADMIN — SODIUM CHLORIDE, POTASSIUM CHLORIDE, SODIUM LACTATE AND CALCIUM CHLORIDE 250 ML/HR: 600; 310; 30; 20 INJECTION, SOLUTION INTRAVENOUS at 02:38

## 2025-07-12 RX ADMIN — PANTOPRAZOLE SODIUM 40 MG: 40 TABLET, DELAYED RELEASE ORAL at 17:32

## 2025-07-12 RX ADMIN — HEPARIN SODIUM 19 UNITS/KG/HR: 10000 INJECTION, SOLUTION INTRAVENOUS at 04:40

## 2025-07-12 RX ADMIN — Medication 10 ML: at 09:43

## 2025-07-12 RX ADMIN — PANTOPRAZOLE SODIUM 40 MG: 40 TABLET, DELAYED RELEASE ORAL at 05:53

## 2025-07-12 NOTE — PLAN OF CARE
Goal Outcome Evaluation:  Plan of Care Reviewed With: patient        Progress: no change  Outcome Evaluation: Patient alert and oriented x4 on room air. C/o abdominal pain medicated with PRN pain medication per MAR. Iron infused this shift. Antibiotics administered as ordered. No new issues at this time.

## 2025-07-12 NOTE — PROGRESS NOTES
[facility]   Hospitalist Progress Note  Date: 2025  Patient Name: Aubree Krueger  : 1985  MRN: [id number]  Date of admission: 7/10/2025  Room/Bed: [facility]      Subjective   Subjective   Chief Complaint: abdominal pain     Summary:  Aubree Krueger is a 40 y.o. female who presents with severe abdominal pain, nausea, vomiting.  Patient was recently diagnosed with pancreatitis and signed out AMA.  She is unable to tolerate food without severe abdominal pain.  Patient states that she has not drank alcohol since prior hospitalization.     On arrival to the ED, patient temperature 98.4, pulse 87, respiratory rate of 18, blood pressure 91/59, and she saturating 100% on room air.     Findings of CT abdomen and pelvis: There are findings still compatible with acute pancreatitis.  There is no pancreatic pseudocyst abscess or necrosis.  The degree of peripancreatic inflammatory change is decreased.  Interval development of partial acute/subacute venous thrombosis of the main portal vein at the portal venous confluence.  There is also similarly appearing occlusive thrombus involving the proximal superior mesenteric vein extending into the branch vessels.  Thickening of the wall of the descending limb of the duodenal C-loop probably representing reactive duodenitis.  Hepatic steatosis.  Hepatosplenomegaly.  Trace amount of free fluid.    Interval Followup: Patient continues to experience abdominal pain.  She is tolerating some of her diet.  FOBT is positive for blood.  Blood pressure has improved today.  Patient has iron deficiency anemia.  Hemoglobin has improved.    Review of Systems    All systems reviewed and negative except for what is outlined above.      Objective   Objective     Vitals:   Temp:  [97.6 °F (36.4 °C)-98.1 °F (36.7 °C)] 98.1 °F (36.7 °C)  Heart Rate:  [] 71  Resp:  [18] 18  BP: ()/(62-81) 104/68    Physical Exam   General: Awake, alert, NAD  HENT: NCAT, MMM  Eyes: pupils equal,  no scleral icterus  Cardiovascular: RRR, no murmurs   Pulmonary: CTA bilaterally; no wheezes; no conversational dyspnea  Gastrointestinal: S/ND/NT, +BS  Musculoskeletal: No gross deformities  Skin: No jaundice, no rash on exposed skin appreciated  Neuro: CN II through XII grossly intact; speech clear; no tremor  Psych: Mood and affect appropriate  : No Merchant catheter; no suprapubic tenderness    Result Review    Result Review:  I have personally reviewed these results:  [x]  Laboratory      Lab 07/12/25  0829 07/12/25  0159 07/11/25 2018 07/11/25  1230 07/11/25  0456 07/10/25  2139 07/10/25  1244   WBC 3.92  --   --   --  3.94  --  7.69   HEMOGLOBIN 10.5*  --   --   --  9.8*  --  12.2   HEMATOCRIT 33.6*  --   --   --  31.7*  --  37.5   PLATELETS 260  --   --   --  244  --  293   NEUTROS ABS 2.70  --   --   --  2.18  --  6.12   IMMATURE GRANS (ABS) 0.02  --   --   --  0.01  --  0.02   LYMPHS ABS 0.86  --   --   --  1.22  --  0.95   MONOS ABS 0.20  --   --   --  0.34  --  0.45   EOS ABS 0.11  --   --   --  0.16  --  0.12   MCV 98.5*  --   --   --  98.8*  --  94.2   PROTIME  --   --   --   --   --   --  15.3*   APTT 33.7* 145.8* 93.2   < > 69.4*   < > 30.3*    < > = values in this interval not displayed.         Lab 07/12/25  0829 07/11/25  0456 07/10/25  1244   SODIUM 141 139 133*   POTASSIUM 3.5 4.2 4.1   CHLORIDE 108* 110* 99   CO2 24.6 23.0 22.8   ANION GAP 8.4 6.0 11.2   BUN 3.3* 4.8* 6.7   CREATININE 0.74 0.78 0.72   EGFR 106.4 99.8 109.9   GLUCOSE 92 80 90   CALCIUM 9.7 9.6 10.7*   MAGNESIUM 1.8 1.9 2.1   PHOSPHORUS 3.0 2.9 3.1         Lab 07/11/25  0456 07/10/25  1244   TOTAL PROTEIN  --  7.1   ALBUMIN  --  4.5   GLOBULIN  --  2.6   ALT (SGPT)  --  18   AST (SGOT)  --  23   BILIRUBIN  --  0.5   ALK PHOS  --  120*   LIPASE 290* 573*         Lab 07/10/25  1244   PROTIME 15.3*   INR 1.16*         Lab 07/10/25  1244   TRIGLYCERIDES 83         Lab 07/12/25  0829 07/11/25  0456 07/10/25  1244   IRON 40   < >  --     IRON SATURATION (TSAT) 11*   < >  --    TIBC 361   < >  --    TRANSFERRIN 242   < >  --    FERRITIN 108.70   < >  --    FOLATE  --   --  3.72*   VITAMIN B 12  --   --  595    < > = values in this interval not displayed.         Brief Urine Lab Results  (Last result in the past 365 days)        Color   Clarity   Blood   Leuk Est   Nitrite   Protein   CREAT   Urine HCG        07/11/25 1258 Yellow   Clear   Negative   Trace   Negative   Negative                 [x]  Microbiology   Microbiology Results (last 10 days)       Procedure Component Value - Date/Time    Blood Culture - Blood, Hand, Left [[id number]]  (Normal) Collected: 07/11/25 1230    Lab Status: Preliminary result Specimen: Blood from Hand, Left Updated: 07/12/25 1245     Blood Culture No growth at 24 hours    Blood Culture - Blood, Arm, Right [[id number]]  (Normal) Collected: 07/11/25 1230    Lab Status: Preliminary result Specimen: Blood from Arm, Right Updated: 07/12/25 1245     Blood Culture No growth at 24 hours          [x]  Radiology  CT Abdomen Pelvis With Contrast  Result Date: 7/10/2025  Impression: 1.There are findings still compatible with acute pancreatitis. There is no pancreatic pseudocyst, abscess, or necrosis. The degree of peripancreatic inflammatory change has decreased. 2.Interval development of partial acute/subacute venous thrombosis of the main portal vein at the portal venous confluence. There is also similar appearing partially occlusive thrombus involving proximal superior mesenteric vein extending into one of the  branch vessels. 3.Thickening of the wall of the descending limb of the duodenal C-loop probably representing reactive duodenitis. 4.Hepatic steatosis. 5.Hepatosplenomegaly. 6.Trace amount of free pelvic fluid. 7.The abnormal results were discussed with DAMIAN Ellis by telephone. Electronically Signed: Tyrese Roberson MD  7/10/2025 2:33 PM EDT  Workstation ID: [id number]    [x]  EKG/Telemetry   [x]  Cardiology/Vascular    [x]  Pathology  [x]  Old records  [x]  Other:    Assessment & Plan   Assessment / Plan   #1 pancreatitis-  -IV fluids, pain medication, antiemetics.  -Will check triglycerides.  Within normal limits.  -Patient states that she has abstained from alcohol since last admission.      #2 concern for GI bleed-  -FOBT positive.  GI consulted.  Protonix twice daily.     #2 portal vein thrombosis probably secondary to inflammation  - Seen by vascular surgery.  Transition to [facility].    #3  Alcohol abuse  -Patient states she has not drank alcohol since last admission.     #4 bipolar disease  -Patient not on medication.     #5 hepatitis C which has been treated.     #6 nicotine dependence  -Nicotine patch ordered     #7 asthma  -Inhaler ordered     #8 asymptomatic UTI  - Will treat since patient has hypotension.  Started on Rocephin.  Blood cultures no growth to date.    #9 anemia will monitor.  Anemia panel shows iron deficiency anemia, and FOBT positive.  Will start iron supplementation.       Discussed with RN.    VTE Prophylaxis:  Pharmacologic & mechanical VTE prophylaxis orders are present.        CODE STATUS:   Code Status (Patient has no pulse and is not breathing): CPR (Attempt to Resuscitate)  Medical Interventions (Patient has pulse or is breathing): Full Support  Level Of Support Discussed With: Patient      Electronically signed by Katherine Soliz DO, 7/12/2025, 14:48 EDT.

## 2025-07-12 NOTE — PLAN OF CARE
Goal Outcome Evaluation:  Plan of Care Reviewed With: patient        Progress: no change  Outcome Evaluation: Pt remains A&Ox4. VSS, on room air. C/o severe abdominal pain - prn pain meds given. On hep gtt - currently infusing at 19 units/kg/hr, titrated per protocol. IV fluids infusing. Ad linda. Still need stool sample. Call light in reach, able to make needs known. No further issues/needs at this time. Continue plan of care.

## 2025-07-13 LAB
ANION GAP SERPL CALCULATED.3IONS-SCNC: 10 MMOL/L (ref 5–15)
BASOPHILS # BLD AUTO: 0.02 10*3/MM3 (ref 0–0.2)
BASOPHILS NFR BLD AUTO: 0.5 % (ref 0–1.5)
BUN SERPL-MCNC: 2.7 MG/DL (ref 6–20)
BUN/CREAT SERPL: 3.5 (ref 7–25)
CALCIUM SPEC-SCNC: 9.4 MG/DL (ref 8.6–10.5)
CHLORIDE SERPL-SCNC: 107 MMOL/L (ref 98–107)
CO2 SERPL-SCNC: 24 MMOL/L (ref 22–29)
CREAT SERPL-MCNC: 0.77 MG/DL (ref 0.57–1)
DEPRECATED RDW RBC AUTO: 57.3 FL (ref 37–54)
EGFRCR SERPLBLD CKD-EPI 2021: 101.4 ML/MIN/1.73
EOSINOPHIL # BLD AUTO: 0.13 10*3/MM3 (ref 0–0.4)
EOSINOPHIL NFR BLD AUTO: 3.1 % (ref 0.3–6.2)
ERYTHROCYTE [DISTWIDTH] IN BLOOD BY AUTOMATED COUNT: 15.9 % (ref 12.3–15.4)
GLUCOSE SERPL-MCNC: 87 MG/DL (ref 65–99)
HCT VFR BLD AUTO: 30.6 % (ref 34–46.6)
HGB BLD-MCNC: 9.7 G/DL (ref 12–15.9)
IMM GRANULOCYTES # BLD AUTO: 0.02 10*3/MM3 (ref 0–0.05)
IMM GRANULOCYTES NFR BLD AUTO: 0.5 % (ref 0–0.5)
LYMPHOCYTES # BLD AUTO: 0.89 10*3/MM3 (ref 0.7–3.1)
LYMPHOCYTES NFR BLD AUTO: 21.1 % (ref 19.6–45.3)
MCH RBC QN AUTO: 30.5 PG (ref 26.6–33)
MCHC RBC AUTO-ENTMCNC: 31.7 G/DL (ref 31.5–35.7)
MCV RBC AUTO: 96.2 FL (ref 79–97)
MONOCYTES # BLD AUTO: 0.32 10*3/MM3 (ref 0.1–0.9)
MONOCYTES NFR BLD AUTO: 7.6 % (ref 5–12)
NEUTROPHILS NFR BLD AUTO: 2.83 10*3/MM3 (ref 1.7–7)
NEUTROPHILS NFR BLD AUTO: 67.2 % (ref 42.7–76)
NRBC BLD AUTO-RTO: 0 /100 WBC (ref 0–0.2)
PLATELET # BLD AUTO: 246 10*3/MM3 (ref 140–450)
PMV BLD AUTO: 9.2 FL (ref 6–12)
POTASSIUM SERPL-SCNC: 3.4 MMOL/L (ref 3.5–5.2)
RBC # BLD AUTO: 3.18 10*6/MM3 (ref 3.77–5.28)
SODIUM SERPL-SCNC: 141 MMOL/L (ref 136–145)
WBC NRBC COR # BLD AUTO: 4.21 10*3/MM3 (ref 3.4–10.8)

## 2025-07-13 PROCEDURE — 99232 SBSQ HOSP IP/OBS MODERATE 35: CPT | Performed by: HOSPITALIST

## 2025-07-13 PROCEDURE — 25010000002 CEFTRIAXONE PER 250 MG: Performed by: HOSPITALIST

## 2025-07-13 PROCEDURE — 25810000003 SODIUM CHLORIDE 0.9 % SOLUTION: Performed by: HOSPITALIST

## 2025-07-13 PROCEDURE — 80048 BASIC METABOLIC PNL TOTAL CA: CPT | Performed by: HOSPITALIST

## 2025-07-13 PROCEDURE — 25010000002 NA FERRIC GLUC CPLX PER 12.5 MG: Performed by: HOSPITALIST

## 2025-07-13 PROCEDURE — 25010000002 HYDROMORPHONE 1 MG/ML SOLUTION: Performed by: HOSPITALIST

## 2025-07-13 PROCEDURE — 85025 COMPLETE CBC W/AUTO DIFF WBC: CPT | Performed by: HOSPITALIST

## 2025-07-13 RX ORDER — SUCRALFATE 1 G/1
1 TABLET ORAL
Status: DISCONTINUED | OUTPATIENT
Start: 2025-07-13 | End: 2025-07-17 | Stop reason: HOSPADM

## 2025-07-13 RX ORDER — POTASSIUM CHLORIDE 750 MG/1
40 CAPSULE, EXTENDED RELEASE ORAL 2 TIMES DAILY WITH MEALS
Status: COMPLETED | OUTPATIENT
Start: 2025-07-13 | End: 2025-07-13

## 2025-07-13 RX ADMIN — POTASSIUM CHLORIDE 40 MEQ: 750 CAPSULE, EXTENDED RELEASE ORAL at 17:08

## 2025-07-13 RX ADMIN — HYDROMORPHONE HYDROCHLORIDE 0.5 MG: 1 INJECTION, SOLUTION INTRAMUSCULAR; INTRAVENOUS; SUBCUTANEOUS at 10:49

## 2025-07-13 RX ADMIN — PANTOPRAZOLE SODIUM 40 MG: 40 TABLET, DELAYED RELEASE ORAL at 06:18

## 2025-07-13 RX ADMIN — HYDROMORPHONE HYDROCHLORIDE 0.5 MG: 1 INJECTION, SOLUTION INTRAMUSCULAR; INTRAVENOUS; SUBCUTANEOUS at 04:20

## 2025-07-13 RX ADMIN — HYDROMORPHONE HYDROCHLORIDE 0.5 MG: 1 INJECTION, SOLUTION INTRAMUSCULAR; INTRAVENOUS; SUBCUTANEOUS at 08:37

## 2025-07-13 RX ADMIN — Medication 10 ML: at 08:38

## 2025-07-13 RX ADMIN — SUCRALFATE 1 G: 1 TABLET ORAL at 22:17

## 2025-07-13 RX ADMIN — HYDROMORPHONE HYDROCHLORIDE 0.5 MG: 1 INJECTION, SOLUTION INTRAMUSCULAR; INTRAVENOUS; SUBCUTANEOUS at 21:01

## 2025-07-13 RX ADMIN — Medication 10 ML: at 21:01

## 2025-07-13 RX ADMIN — SODIUM CHLORIDE 250 MG: 9 INJECTION, SOLUTION INTRAVENOUS at 14:29

## 2025-07-13 RX ADMIN — APIXABAN 5 MG: 5 TABLET, FILM COATED ORAL at 08:37

## 2025-07-13 RX ADMIN — APIXABAN 5 MG: 5 TABLET, FILM COATED ORAL at 21:01

## 2025-07-13 RX ADMIN — PANTOPRAZOLE SODIUM 40 MG: 40 TABLET, DELAYED RELEASE ORAL at 17:02

## 2025-07-13 RX ADMIN — Medication 5 MG: at 21:01

## 2025-07-13 RX ADMIN — SUCRALFATE 1 G: 1 TABLET ORAL at 17:02

## 2025-07-13 RX ADMIN — CEFTRIAXONE SODIUM 1000 MG: 1 INJECTION, POWDER, FOR SOLUTION INTRAMUSCULAR; INTRAVENOUS at 13:07

## 2025-07-13 RX ADMIN — HYDROMORPHONE HYDROCHLORIDE 0.5 MG: 1 INJECTION, SOLUTION INTRAMUSCULAR; INTRAVENOUS; SUBCUTANEOUS at 13:07

## 2025-07-13 RX ADMIN — POTASSIUM CHLORIDE 40 MEQ: 750 CAPSULE, EXTENDED RELEASE ORAL at 08:37

## 2025-07-13 RX ADMIN — HYDROMORPHONE HYDROCHLORIDE 0.5 MG: 1 INJECTION, SOLUTION INTRAMUSCULAR; INTRAVENOUS; SUBCUTANEOUS at 17:02

## 2025-07-13 NOTE — PLAN OF CARE
Goal Outcome Evaluation:  Plan of Care Reviewed With: patient        Progress: no change  Outcome Evaluation: Pt A&Ox4, pleasant. VSS, on room air. C/o severe abdominal pain - describes it as burning. Hurts worse after attempting to eat. Medicated with prn pain medication. No c/o nausea this shift. Ad linda in room. Call light in reach, able to make needs known. No further issues/needs at this time.

## 2025-07-13 NOTE — PLAN OF CARE
Goal Outcome Evaluation:  Plan of Care Reviewed With: patient        Progress: no change  Outcome Evaluation: Patient alert and oriented x4 on room air. Patient medicated for severe abdominal pain with PRN pain medication per MAR. Iron infusion administered as ordered. No new issues at this time.                              Photo Preface (Leave Blank If You Do Not Want): Photographs were obtained today Detail Level: Zone

## 2025-07-13 NOTE — PROGRESS NOTES
[facility]   Hospitalist Progress Note  Date: 2025  Patient Name: Aubree Krueger  : 1985  MRN: [id number]  Date of admission: 7/10/2025  Room/Bed: [facility]      Subjective   Subjective   Chief Complaint: abdominal pain     Summary:  Aubree Krueger is a 40 y.o. female who presents with severe abdominal pain, nausea, vomiting.  Patient was recently diagnosed with pancreatitis and signed out AMA.  She is unable to tolerate food without severe abdominal pain.  Patient states that she has not drank alcohol since prior hospitalization.     On arrival to the ED, patient temperature 98.4, pulse 87, respiratory rate of 18, blood pressure 91/59, and she saturating 100% on room air.     Findings of CT abdomen and pelvis: There are findings still compatible with acute pancreatitis.  There is no pancreatic pseudocyst abscess or necrosis.  The degree of peripancreatic inflammatory change is decreased.  Interval development of partial acute/subacute venous thrombosis of the main portal vein at the portal venous confluence.  There is also similarly appearing occlusive thrombus involving the proximal superior mesenteric vein extending into the branch vessels.  Thickening of the wall of the descending limb of the duodenal C-loop probably representing reactive duodenitis.  Hepatic steatosis.  Hepatosplenomegaly.  Trace amount of free fluid.    Interval Followup:  Patient's BP has improved.  Potassium low.  Hemoglobin stable.  FOBT positive.      Review of Systems    All systems reviewed and negative except for what is outlined above.      Objective   Objective     Vitals:   Temp:  [98 °F (36.7 °C)-98.8 °F (37.1 °C)] 98.2 °F (36.8 °C)  Heart Rate:  [54-72] 55  Resp:  [16-18] 16  BP: (104-125)/(62-84) 111/74    Physical Exam   General: Awake, alert, NAD  HENT: NCAT, MMM  Eyes: pupils equal, no scleral icterus  Cardiovascular: RRR, no murmurs   Pulmonary: CTA bilaterally; no wheezes; no conversational  dyspnea  Gastrointestinal: S/ND/NT, +BS  Musculoskeletal: No gross deformities  Skin: No jaundice, no rash on exposed skin appreciated  Neuro: CN II through XII grossly intact; speech clear; no tremor  Psych: Mood and affect appropriate  : No Merchant catheter; no suprapubic tenderness    Result Review    Result Review:  I have personally reviewed these results:  [x]  Laboratory      Lab 07/13/25 0409 07/12/25 0829 07/12/25  0159 07/11/25 2018 07/11/25  1230 07/11/25  0456 07/10/25  2139 07/10/25  1244   WBC 4.21 3.92  --   --   --  3.94  --  7.69   HEMOGLOBIN 9.7* 10.5*  --   --   --  9.8*  --  12.2   HEMATOCRIT 30.6* 33.6*  --   --   --  31.7*  --  37.5   PLATELETS 246 260  --   --   --  244  --  293   NEUTROS ABS 2.83 2.70  --   --   --  2.18  --  6.12   IMMATURE GRANS (ABS) 0.02 0.02  --   --   --  0.01  --  0.02   LYMPHS ABS 0.89 0.86  --   --   --  1.22  --  0.95   MONOS ABS 0.32 0.20  --   --   --  0.34  --  0.45   EOS ABS 0.13 0.11  --   --   --  0.16  --  0.12   MCV 96.2 98.5*  --   --   --  98.8*  --  94.2   PROTIME  --   --   --   --   --   --   --  15.3*   APTT  --  33.7* 145.8* 93.2   < > 69.4*   < > 30.3*    < > = values in this interval not displayed.         Lab 07/13/25  0409 07/12/25  0829 07/11/25  0456 07/10/25  1244   SODIUM 141 141 139 133*   POTASSIUM 3.4* 3.5 4.2 4.1   CHLORIDE 107 108* 110* 99   CO2 24.0 24.6 23.0 22.8   ANION GAP 10.0 8.4 6.0 11.2   BUN 2.7* 3.3* 4.8* 6.7   CREATININE 0.77 0.74 0.78 0.72   EGFR 101.4 106.4 99.8 109.9   GLUCOSE 87 92 80 90   CALCIUM 9.4 9.7 9.6 10.7*   MAGNESIUM  --  1.8 1.9 2.1   PHOSPHORUS  --  3.0 2.9 3.1         Lab 07/11/25  0456 07/10/25  1244   TOTAL PROTEIN  --  7.1   ALBUMIN  --  4.5   GLOBULIN  --  2.6   ALT (SGPT)  --  18   AST (SGOT)  --  23   BILIRUBIN  --  0.5   ALK PHOS  --  120*   LIPASE 290* 573*         Lab 07/10/25  1244   PROTIME 15.3*   INR 1.16*         Lab 07/10/25  1244   TRIGLYCERIDES 83         Lab 07/12/25  0829 07/11/25  0453  07/10/25  1244   IRON 40   < >  --    IRON SATURATION (TSAT) 11*   < >  --    TIBC 361   < >  --    TRANSFERRIN 242   < >  --    FERRITIN 108.70   < >  --    FOLATE  --   --  3.72*   VITAMIN B 12  --   --  595    < > = values in this interval not displayed.         Brief Urine Lab Results  (Last result in the past 365 days)        Color   Clarity   Blood   Leuk Est   Nitrite   Protein   CREAT   Urine HCG        07/11/25 1258 Yellow   Clear   Negative   Trace   Negative   Negative                 [x]  Microbiology   Microbiology Results (last 10 days)       Procedure Component Value - Date/Time    Blood Culture - Blood, Hand, Left [[id number]]  (Normal) Collected: 07/11/25 1230    Lab Status: Preliminary result Specimen: Blood from Hand, Left Updated: 07/13/25 1245     Blood Culture No growth at 2 days    Blood Culture - Blood, Arm, Right [[id number]]  (Normal) Collected: 07/11/25 1230    Lab Status: Preliminary result Specimen: Blood from Arm, Right Updated: 07/13/25 1245     Blood Culture No growth at 2 days          [x]  Radiology  CT Abdomen Pelvis With Contrast  Result Date: 7/10/2025  Impression: 1.There are findings still compatible with acute pancreatitis. There is no pancreatic pseudocyst, abscess, or necrosis. The degree of peripancreatic inflammatory change has decreased. 2.Interval development of partial acute/subacute venous thrombosis of the main portal vein at the portal venous confluence. There is also similar appearing partially occlusive thrombus involving proximal superior mesenteric vein extending into one of the  branch vessels. 3.Thickening of the wall of the descending limb of the duodenal C-loop probably representing reactive duodenitis. 4.Hepatic steatosis. 5.Hepatosplenomegaly. 6.Trace amount of free pelvic fluid. 7.The abnormal results were discussed with DAMIAN Ellis by telephone. Electronically Signed: Tyrese Roberson MD  7/10/2025 2:33 PM EDT  Workstation ID: [id number]    [x]   EKG/Telemetry   [x]  Cardiology/Vascular   [x]  Pathology  [x]  Old records  [x]  Other:    Assessment & Plan   Assessment / Plan   #1 pancreatitis-  -IV fluids, pain medication, antiemetics.  -Will check triglycerides.  Within normal limits.  -Patient states that she has abstained from alcohol since last admission.      #2 concern for GI bleed-  -FOBT positive.  GI consulted.  Protonix twice daily. Added carafate to see if that helps with the pain.       #2 portal vein thrombosis probably secondary to inflammation  - Seen by vascular surgery.  Transition to Eliquis.    #3  Alcohol abuse  -Patient states she has not drank alcohol since last admission.     #4 bipolar disease  -Patient not on medication.     #5 hepatitis C which has been treated.     #6 nicotine dependence  -Nicotine patch ordered     #7 asthma  -Inhaler ordered     #8 asymptomatic UTI  - Will treat since patient has hypotension.  Started on Rocephin.  Blood cultures no growth to date.    #9 anemia will monitor.  Anemia panel shows iron deficiency anemia, and FOBT positive.  Will start iron supplementation.       Discussed with RN.    VTE Prophylaxis:  Pharmacologic & mechanical VTE prophylaxis orders are present.        CODE STATUS:   Code Status (Patient has no pulse and is not breathing): CPR (Attempt to Resuscitate)  Medical Interventions (Patient has pulse or is breathing): Full Support  Level Of Support Discussed With: Patient      Electronically signed by Katherine Soliz DO, 7/13/2025, 13:30 EDT.

## 2025-07-14 ENCOUNTER — APPOINTMENT (OUTPATIENT)
Dept: CT IMAGING | Facility: HOSPITAL | Age: 40
DRG: 438 | End: 2025-07-14

## 2025-07-14 LAB
ANION GAP SERPL CALCULATED.3IONS-SCNC: 9 MMOL/L (ref 5–15)
BASOPHILS # BLD AUTO: 0.02 10*3/MM3 (ref 0–0.2)
BASOPHILS NFR BLD AUTO: 0.5 % (ref 0–1.5)
BUN SERPL-MCNC: <2 MG/DL (ref 6–20)
BUN/CREAT SERPL: ABNORMAL
CALCIUM SPEC-SCNC: 9.8 MG/DL (ref 8.6–10.5)
CHLORIDE SERPL-SCNC: 108 MMOL/L (ref 98–107)
CO2 SERPL-SCNC: 23 MMOL/L (ref 22–29)
CREAT SERPL-MCNC: 0.76 MG/DL (ref 0.57–1)
DEPRECATED RDW RBC AUTO: 58.6 FL (ref 37–54)
EGFRCR SERPLBLD CKD-EPI 2021: 103 ML/MIN/1.73
EOSINOPHIL # BLD AUTO: 0.17 10*3/MM3 (ref 0–0.4)
EOSINOPHIL NFR BLD AUTO: 4.5 % (ref 0.3–6.2)
ERYTHROCYTE [DISTWIDTH] IN BLOOD BY AUTOMATED COUNT: 16.5 % (ref 12.3–15.4)
GLUCOSE SERPL-MCNC: 82 MG/DL (ref 65–99)
HCT VFR BLD AUTO: 32.9 % (ref 34–46.6)
HGB BLD-MCNC: 10.2 G/DL (ref 12–15.9)
IMM GRANULOCYTES # BLD AUTO: 0.01 10*3/MM3 (ref 0–0.05)
IMM GRANULOCYTES NFR BLD AUTO: 0.3 % (ref 0–0.5)
LYMPHOCYTES # BLD AUTO: 1.1 10*3/MM3 (ref 0.7–3.1)
LYMPHOCYTES NFR BLD AUTO: 29.2 % (ref 19.6–45.3)
MCH RBC QN AUTO: 30.2 PG (ref 26.6–33)
MCHC RBC AUTO-ENTMCNC: 31 G/DL (ref 31.5–35.7)
MCV RBC AUTO: 97.3 FL (ref 79–97)
MONOCYTES # BLD AUTO: 0.41 10*3/MM3 (ref 0.1–0.9)
MONOCYTES NFR BLD AUTO: 10.9 % (ref 5–12)
NEUTROPHILS NFR BLD AUTO: 2.06 10*3/MM3 (ref 1.7–7)
NEUTROPHILS NFR BLD AUTO: 54.6 % (ref 42.7–76)
NRBC BLD AUTO-RTO: 0 /100 WBC (ref 0–0.2)
PLATELET # BLD AUTO: 225 10*3/MM3 (ref 140–450)
PMV BLD AUTO: 9.5 FL (ref 6–12)
POTASSIUM SERPL-SCNC: 3.8 MMOL/L (ref 3.5–5.2)
RBC # BLD AUTO: 3.38 10*6/MM3 (ref 3.77–5.28)
SODIUM SERPL-SCNC: 140 MMOL/L (ref 136–145)
WBC NRBC COR # BLD AUTO: 3.77 10*3/MM3 (ref 3.4–10.8)

## 2025-07-14 PROCEDURE — 25010000002 CEFTRIAXONE PER 250 MG: Performed by: HOSPITALIST

## 2025-07-14 PROCEDURE — 25810000003 LACTATED RINGERS PER 1000 ML: Performed by: INTERNAL MEDICINE

## 2025-07-14 PROCEDURE — 25810000003 LACTATED RINGERS SOLUTION: Performed by: INTERNAL MEDICINE

## 2025-07-14 PROCEDURE — 25010000002 KETOROLAC TROMETHAMINE PER 15 MG: Performed by: HOSPITALIST

## 2025-07-14 PROCEDURE — 99232 SBSQ HOSP IP/OBS MODERATE 35: CPT | Performed by: INTERNAL MEDICINE

## 2025-07-14 PROCEDURE — 25010000002 HYDROMORPHONE 1 MG/ML SOLUTION: Performed by: INTERNAL MEDICINE

## 2025-07-14 PROCEDURE — 74176 CT ABD & PELVIS W/O CONTRAST: CPT

## 2025-07-14 PROCEDURE — 80048 BASIC METABOLIC PNL TOTAL CA: CPT | Performed by: HOSPITALIST

## 2025-07-14 PROCEDURE — 85025 COMPLETE CBC W/AUTO DIFF WBC: CPT | Performed by: HOSPITALIST

## 2025-07-14 PROCEDURE — 25010000002 HYDROMORPHONE 1 MG/ML SOLUTION: Performed by: HOSPITALIST

## 2025-07-14 RX ORDER — SODIUM CHLORIDE, SODIUM LACTATE, POTASSIUM CHLORIDE, CALCIUM CHLORIDE 600; 310; 30; 20 MG/100ML; MG/100ML; MG/100ML; MG/100ML
150 INJECTION, SOLUTION INTRAVENOUS CONTINUOUS
Status: ACTIVE | OUTPATIENT
Start: 2025-07-14 | End: 2025-07-16

## 2025-07-14 RX ORDER — PANTOPRAZOLE SODIUM 40 MG/10ML
40 INJECTION, POWDER, LYOPHILIZED, FOR SOLUTION INTRAVENOUS
Status: DISCONTINUED | OUTPATIENT
Start: 2025-07-14 | End: 2025-07-17 | Stop reason: HOSPADM

## 2025-07-14 RX ADMIN — HYDROMORPHONE HYDROCHLORIDE 0.5 MG: 1 INJECTION, SOLUTION INTRAMUSCULAR; INTRAVENOUS; SUBCUTANEOUS at 12:30

## 2025-07-14 RX ADMIN — Medication 5 MG: at 20:46

## 2025-07-14 RX ADMIN — SUCRALFATE 1 G: 1 TABLET ORAL at 17:29

## 2025-07-14 RX ADMIN — SENNOSIDES, DOCUSATE SODIUM 2 TABLET: 50; 8.6 TABLET, FILM COATED ORAL at 13:53

## 2025-07-14 RX ADMIN — SUCRALFATE 1 G: 1 TABLET ORAL at 20:46

## 2025-07-14 RX ADMIN — HYDROMORPHONE HYDROCHLORIDE 0.5 MG: 1 INJECTION, SOLUTION INTRAMUSCULAR; INTRAVENOUS; SUBCUTANEOUS at 09:24

## 2025-07-14 RX ADMIN — HYDROMORPHONE HYDROCHLORIDE 0.5 MG: 1 INJECTION, SOLUTION INTRAMUSCULAR; INTRAVENOUS; SUBCUTANEOUS at 16:02

## 2025-07-14 RX ADMIN — KETOROLAC TROMETHAMINE 30 MG: 30 INJECTION, SOLUTION INTRAMUSCULAR; INTRAVENOUS at 13:53

## 2025-07-14 RX ADMIN — Medication 10 ML: at 20:45

## 2025-07-14 RX ADMIN — PANTOPRAZOLE SODIUM 40 MG: 40 INJECTION, POWDER, LYOPHILIZED, FOR SOLUTION INTRAVENOUS at 17:29

## 2025-07-14 RX ADMIN — CEFTRIAXONE SODIUM 1000 MG: 1 INJECTION, POWDER, FOR SOLUTION INTRAMUSCULAR; INTRAVENOUS at 13:53

## 2025-07-14 RX ADMIN — HYDROMORPHONE HYDROCHLORIDE 0.5 MG: 1 INJECTION, SOLUTION INTRAMUSCULAR; INTRAVENOUS; SUBCUTANEOUS at 00:54

## 2025-07-14 RX ADMIN — HYDROMORPHONE HYDROCHLORIDE 0.5 MG: 1 INJECTION, SOLUTION INTRAMUSCULAR; INTRAVENOUS; SUBCUTANEOUS at 18:07

## 2025-07-14 RX ADMIN — HYDROMORPHONE HYDROCHLORIDE 0.5 MG: 1 INJECTION, SOLUTION INTRAMUSCULAR; INTRAVENOUS; SUBCUTANEOUS at 04:10

## 2025-07-14 RX ADMIN — PANTOPRAZOLE SODIUM 40 MG: 40 TABLET, DELAYED RELEASE ORAL at 07:14

## 2025-07-14 RX ADMIN — HYDROMORPHONE HYDROCHLORIDE 0.5 MG: 1 INJECTION, SOLUTION INTRAMUSCULAR; INTRAVENOUS; SUBCUTANEOUS at 07:14

## 2025-07-14 RX ADMIN — SUCRALFATE 1 G: 1 TABLET ORAL at 11:08

## 2025-07-14 RX ADMIN — Medication 10 ML: at 09:24

## 2025-07-14 RX ADMIN — SUCRALFATE 1 G: 1 TABLET ORAL at 09:24

## 2025-07-14 RX ADMIN — SODIUM CHLORIDE, POTASSIUM CHLORIDE, SODIUM LACTATE AND CALCIUM CHLORIDE 150 ML/HR: 600; 310; 30; 20 INJECTION, SOLUTION INTRAVENOUS at 17:29

## 2025-07-14 RX ADMIN — HYDROCODONE BITARTRATE AND ACETAMINOPHEN 1 TABLET: 5; 325 TABLET ORAL at 11:08

## 2025-07-14 RX ADMIN — APIXABAN 5 MG: 5 TABLET, FILM COATED ORAL at 20:46

## 2025-07-14 RX ADMIN — HYDROMORPHONE HYDROCHLORIDE 0.5 MG: 1 INJECTION, SOLUTION INTRAMUSCULAR; INTRAVENOUS; SUBCUTANEOUS at 20:49

## 2025-07-14 RX ADMIN — APIXABAN 5 MG: 5 TABLET, FILM COATED ORAL at 09:24

## 2025-07-14 RX ADMIN — SODIUM CHLORIDE, POTASSIUM CHLORIDE, SODIUM LACTATE AND CALCIUM CHLORIDE 1000 ML: 600; 310; 30; 20 INJECTION, SOLUTION INTRAVENOUS at 16:03

## 2025-07-14 NOTE — PLAN OF CARE
Goal Outcome Evaluation:  Plan of Care Reviewed With: patient        Progress: no change  Outcome Evaluation: PRN pain med given for described burning epigastric pain. Slept intermittently overnight. Says she is not tolerating solid foods too well, and wants to stick with liquids and softer foods for now

## 2025-07-14 NOTE — PROGRESS NOTES
[facility]   Hospitalist Progress Note  Date: 2025  Patient Name: Aubree Krueger  : 1985  MRN: [id number]  Date of admission: 7/10/2025  Room/Bed: [facility]      Subjective   Subjective   Chief Complaint:   abdominal pain     Summary:  Aubree Krueger is a 40 y.o. female who presents with severe abdominal pain, nausea, vomiting.  Patient was recently diagnosed with pancreatitis.  She is unable to tolerate food without severe abdominal pain.  Patient states she has not drank alcohol since prior hospitalization.     On arrival to the ED, patient temperature 98.4, pulse 87, respiratory rate of 18, blood pressure 91/59, and she saturating 100% on room air.     Findings of CT abdomen and pelvis: There are findings still compatible with acute pancreatitis.  There is no pancreatic pseudocyst abscess or necrosis.  The degree of peripancreatic inflammatory change is decreased.  Interval development of partial acute/subacute venous thrombosis of the main portal vein at the portal venous confluence.  There is also similarly appearing occlusive thrombus involving the proximal superior mesenteric vein extending into the branch vessels.  Thickening of the wall of the descending limb of the duodenal C-loop probably representing reactive duodenitis.  Hepatic steatosis.  Hepatosplenomegaly.  Trace amount of free fluid.    Interval Followup:   Patient continues to complain of persistent abdominal pain at this time.  Repeat CT scan obtained today, without contrast, indicates continued acute pancreatitis similar to prior.  No new fluid collection is demonstrated.  Patient states her symptoms are persistent however not significantly worsening.  She did ask the nurse to have her diet downgraded to full liquids.  Starting patient back with IV fluids bolusing 1 L and starting on 100 cc/h.  Continue encourage patient to eat if she can, foods 70 good to her.      Objective   Objective     Vitals:   Temp:  [97.6 °F (36.4  °C)-98.4 °F (36.9 °C)] 97.7 °F (36.5 °C)  Heart Rate:  [53-71] 53  Resp:  [16-18] 18  BP: ()/(47-82) 106/82    Physical Exam   General: Awake, alert, NAD  HENT: NCAT, MMM  Eyes: pupils equal, no scleral icterus  Cardiovascular: RRR, no murmurs   Pulmonary: CTA bilaterally; no wheezes; no conversational dyspnea  Gastrointestinal: Epigastric abdominal pain, no rebound no guarding  Musculoskeletal: No gross deformities  Skin: No jaundice, no rash on exposed skin appreciated  Neuro: CN II through XII grossly intact; speech clear; no tremor    Result Review    Result Review:  I have personally reviewed these results:  [x]  Laboratory      Lab 07/14/25 0419 07/13/25 0409 07/12/25 0829 07/12/25 0159 07/11/25  2018 07/10/25  2139 07/10/25  1244   WBC 3.77 4.21 3.92  --   --    < > 7.69   HEMOGLOBIN 10.2* 9.7* 10.5*  --   --    < > 12.2   HEMATOCRIT 32.9* 30.6* 33.6*  --   --    < > 37.5   PLATELETS 225 246 260  --   --    < > 293   NEUTROS ABS 2.06 2.83 2.70  --   --    < > 6.12   IMMATURE GRANS (ABS) 0.01 0.02 0.02  --   --    < > 0.02   LYMPHS ABS 1.10 0.89 0.86  --   --    < > 0.95   MONOS ABS 0.41 0.32 0.20  --   --    < > 0.45   EOS ABS 0.17 0.13 0.11  --   --    < > 0.12   MCV 97.3* 96.2 98.5*  --   --    < > 94.2   PROTIME  --   --   --   --   --   --  15.3*   APTT  --   --  33.7* 145.8* 93.2   < > 30.3*    < > = values in this interval not displayed.         Lab 07/14/25 0419 07/13/25 0409 07/12/25 0829 07/11/25  0456 07/10/25  1244   SODIUM 140 141 141 139 133*   POTASSIUM 3.8 3.4* 3.5 4.2 4.1   CHLORIDE 108* 107 108* 110* 99   CO2 23.0 24.0 24.6 23.0 22.8   ANION GAP 9.0 10.0 8.4 6.0 11.2   BUN <2.0* 2.7* 3.3* 4.8* 6.7   CREATININE 0.76 0.77 0.74 0.78 0.72   EGFR 103.0 101.4 106.4 99.8 109.9   GLUCOSE 82 87 92 80 90   CALCIUM 9.8 9.4 9.7 9.6 10.7*   MAGNESIUM  --   --  1.8 1.9 2.1   PHOSPHORUS  --   --  3.0 2.9 3.1         Lab 07/11/25  0456 07/10/25  1244   TOTAL PROTEIN  --  7.1   ALBUMIN  --  4.5    GLOBULIN  --  2.6   ALT (SGPT)  --  18   AST (SGOT)  --  23   BILIRUBIN  --  0.5   ALK PHOS  --  120*   LIPASE 290* 573*         Lab 07/10/25  1244   PROTIME 15.3*   INR 1.16*         Lab 07/10/25  1244   TRIGLYCERIDES 83         Lab 07/12/25  0829 07/11/25  0456 07/10/25  1244   IRON 40   < >  --    IRON SATURATION (TSAT) 11*   < >  --    TIBC 361   < >  --    TRANSFERRIN 242   < >  --    FERRITIN 108.70   < >  --    FOLATE  --   --  3.72*   VITAMIN B 12  --   --  595    < > = values in this interval not displayed.         Brief Urine Lab Results  (Last result in the past 365 days)        Color   Clarity   Blood   Leuk Est   Nitrite   Protein   CREAT   Urine HCG        07/11/25 1258 Yellow   Clear   Negative   Trace   Negative   Negative                 [x]  Microbiology   Microbiology Results (last 10 days)       Procedure Component Value - Date/Time    Blood Culture - Blood, Hand, Left [[id number]]  (Normal) Collected: 07/11/25 1230    Lab Status: Preliminary result Specimen: Blood from Hand, Left Updated: 07/14/25 1245     Blood Culture No growth at 3 days    Blood Culture - Blood, Arm, Right [[id number]]  (Normal) Collected: 07/11/25 1230    Lab Status: Preliminary result Specimen: Blood from Arm, Right Updated: 07/14/25 1245     Blood Culture No growth at 3 days          [x]  Radiology  CT Abdomen Pelvis Without Contrast  Result Date: 7/14/2025  There are findings of acute pancreatitis that are similar to prior. No new fluid collection is demonstrated. Cannot assess for pancreatic necrosis without IV contrast, and previously demonstrated portal and superior mesenteric vein thrombosis cannot be reassessed without IV contrast Electronically Signed: Nasir Weber  7/14/2025 1:43 PM EDT  Workstation ID: [id number]    CT Abdomen Pelvis With Contrast  Result Date: 7/10/2025  Impression: 1.There are findings still compatible with acute pancreatitis. There is no pancreatic pseudocyst, abscess, or necrosis. The degree of  peripancreatic inflammatory change has decreased. 2.Interval development of partial acute/subacute venous thrombosis of the main portal vein at the portal venous confluence. There is also similar appearing partially occlusive thrombus involving proximal superior mesenteric vein extending into one of the  branch vessels. 3.Thickening of the wall of the descending limb of the duodenal C-loop probably representing reactive duodenitis. 4.Hepatic steatosis. 5.Hepatosplenomegaly. 6.Trace amount of free pelvic fluid. 7.The abnormal results were discussed with DAMIAN Ellis by telephone. Electronically Signed: Tyrese Roberson MD  7/10/2025 2:33 PM EDT  Workstation ID: [id number]    [x]  EKG/Telemetry   [x]  Cardiology/Vascular   [x]  Pathology  [x]  Old records  [x]  Other:    Assessment & Plan   Assessment / Plan   Pancreatitis  Portal vein thrombosis likely secondary to inflammation from pancreatitis  History of alcohol abuse  Bipolar disorder  Hepatitis C, treated  Nicotine dependence  History of asthma  Asymptomatic bacteriuria    Plan:  Patient remains admitted to the hospital for further care and management  Rebolusing IV fluids today, starting on continuous fluids  Diet downgraded to full liquid  Repeat CT scan obtained today, persistent inflammatory changes associated with pancreatitis  For ongoing concerns or worsening pain, consider repeat CT scan with contrast  Vascular surgery consulted earlier in admission for portal vein thrombosis, continue on Eliquis  Patient with no alcohol withdrawal symptoms  Patient received 4 days of ceftriaxone, initial UA with 4+ bacteria.  No dysuria.  Holding further antibiotics     Discussed with RN.    VTE Prophylaxis:  Pharmacologic & mechanical VTE prophylaxis orders are present.        CODE STATUS:   Code Status (Patient has no pulse and is not breathing): CPR (Attempt to Resuscitate)  Medical Interventions (Patient has pulse or is breathing): Full Support  Level Of Support  Discussed With: Patient      Electronically signed by Donald Mireles MD, 7/14/2025, 18:39 EDT.

## 2025-07-14 NOTE — PLAN OF CARE
Goal Outcome Evaluation:           Progress: no change  Outcome Evaluation: Alert and oriented x4. Complaints of burning abdominal pain this shift, medicated per mar. Ambulating frequently throughout room and hallway. No complaints of nausea. Patient requested to be on full liquid diet.

## 2025-07-15 LAB
ALBUMIN SERPL-MCNC: 3.5 G/DL (ref 3.5–5.2)
ALBUMIN/GLOB SERPL: 1.6 G/DL
ALP SERPL-CCNC: 84 U/L (ref 39–117)
ALT SERPL W P-5'-P-CCNC: 20 U/L (ref 1–33)
ANION GAP SERPL CALCULATED.3IONS-SCNC: 6.9 MMOL/L (ref 5–15)
AST SERPL-CCNC: 20 U/L (ref 1–32)
BILIRUB SERPL-MCNC: 0.2 MG/DL (ref 0–1.2)
BUN SERPL-MCNC: <2 MG/DL (ref 6–20)
BUN/CREAT SERPL: ABNORMAL
CALCIUM SPEC-SCNC: 9.6 MG/DL (ref 8.6–10.5)
CHLORIDE SERPL-SCNC: 110 MMOL/L (ref 98–107)
CO2 SERPL-SCNC: 25.1 MMOL/L (ref 22–29)
CREAT SERPL-MCNC: 0.77 MG/DL (ref 0.57–1)
DEPRECATED RDW RBC AUTO: 59.6 FL (ref 37–54)
EGFRCR SERPLBLD CKD-EPI 2021: 101.4 ML/MIN/1.73
ERYTHROCYTE [DISTWIDTH] IN BLOOD BY AUTOMATED COUNT: 16.4 % (ref 12.3–15.4)
GLOBULIN UR ELPH-MCNC: 2.2 GM/DL
GLUCOSE SERPL-MCNC: 79 MG/DL (ref 65–99)
HCT VFR BLD AUTO: 31.5 % (ref 34–46.6)
HGB BLD-MCNC: 9.6 G/DL (ref 12–15.9)
LIPASE SERPL-CCNC: 524 U/L (ref 13–60)
MAGNESIUM SERPL-MCNC: 2 MG/DL (ref 1.6–2.6)
MCH RBC QN AUTO: 30.2 PG (ref 26.6–33)
MCHC RBC AUTO-ENTMCNC: 30.5 G/DL (ref 31.5–35.7)
MCV RBC AUTO: 99.1 FL (ref 79–97)
PHOSPHATE SERPL-MCNC: 3.4 MG/DL (ref 2.5–4.5)
PLATELET # BLD AUTO: 219 10*3/MM3 (ref 140–450)
PMV BLD AUTO: 9 FL (ref 6–12)
POTASSIUM SERPL-SCNC: 4.2 MMOL/L (ref 3.5–5.2)
PROT SERPL-MCNC: 5.7 G/DL (ref 6–8.5)
RBC # BLD AUTO: 3.18 10*6/MM3 (ref 3.77–5.28)
SODIUM SERPL-SCNC: 142 MMOL/L (ref 136–145)
WBC NRBC COR # BLD AUTO: 3.55 10*3/MM3 (ref 3.4–10.8)

## 2025-07-15 PROCEDURE — 80053 COMPREHEN METABOLIC PANEL: CPT | Performed by: INTERNAL MEDICINE

## 2025-07-15 PROCEDURE — 99232 SBSQ HOSP IP/OBS MODERATE 35: CPT | Performed by: INTERNAL MEDICINE

## 2025-07-15 PROCEDURE — 25010000002 HYDROMORPHONE 1 MG/ML SOLUTION: Performed by: INTERNAL MEDICINE

## 2025-07-15 PROCEDURE — 25010000002 PROCHLORPERAZINE 10 MG/2ML SOLUTION: Performed by: INTERNAL MEDICINE

## 2025-07-15 PROCEDURE — 25010000002 KETOROLAC TROMETHAMINE PER 15 MG: Performed by: HOSPITALIST

## 2025-07-15 PROCEDURE — 25810000003 LACTATED RINGERS PER 1000 ML: Performed by: INTERNAL MEDICINE

## 2025-07-15 PROCEDURE — 84100 ASSAY OF PHOSPHORUS: CPT | Performed by: INTERNAL MEDICINE

## 2025-07-15 PROCEDURE — 83735 ASSAY OF MAGNESIUM: CPT | Performed by: INTERNAL MEDICINE

## 2025-07-15 PROCEDURE — 83690 ASSAY OF LIPASE: CPT | Performed by: INTERNAL MEDICINE

## 2025-07-15 PROCEDURE — 25010000002 ONDANSETRON PER 1 MG: Performed by: HOSPITALIST

## 2025-07-15 PROCEDURE — 85027 COMPLETE CBC AUTOMATED: CPT | Performed by: INTERNAL MEDICINE

## 2025-07-15 RX ORDER — PROCHLORPERAZINE EDISYLATE 5 MG/ML
10 INJECTION INTRAMUSCULAR; INTRAVENOUS EVERY 6 HOURS PRN
Status: DISCONTINUED | OUTPATIENT
Start: 2025-07-15 | End: 2025-07-17 | Stop reason: HOSPADM

## 2025-07-15 RX ORDER — DICYCLOMINE HYDROCHLORIDE 10 MG/1
10 CAPSULE ORAL 4 TIMES DAILY
Status: DISCONTINUED | OUTPATIENT
Start: 2025-07-15 | End: 2025-07-17 | Stop reason: HOSPADM

## 2025-07-15 RX ORDER — OXYCODONE HYDROCHLORIDE 5 MG/1
10 TABLET ORAL EVERY 4 HOURS PRN
Refills: 0 | Status: DISCONTINUED | OUTPATIENT
Start: 2025-07-15 | End: 2025-07-17 | Stop reason: HOSPADM

## 2025-07-15 RX ORDER — ONDANSETRON 4 MG/1
4 TABLET, ORALLY DISINTEGRATING ORAL EVERY 6 HOURS PRN
Status: DISCONTINUED | OUTPATIENT
Start: 2025-07-15 | End: 2025-07-17 | Stop reason: HOSPADM

## 2025-07-15 RX ORDER — OXYCODONE HYDROCHLORIDE 5 MG/1
5 TABLET ORAL EVERY 4 HOURS PRN
Refills: 0 | Status: DISCONTINUED | OUTPATIENT
Start: 2025-07-15 | End: 2025-07-17 | Stop reason: HOSPADM

## 2025-07-15 RX ORDER — ONDANSETRON 2 MG/ML
4 INJECTION INTRAMUSCULAR; INTRAVENOUS EVERY 4 HOURS PRN
Status: DISCONTINUED | OUTPATIENT
Start: 2025-07-15 | End: 2025-07-17 | Stop reason: HOSPADM

## 2025-07-15 RX ADMIN — PROCHLORPERAZINE EDISYLATE 10 MG: 5 INJECTION INTRAMUSCULAR; INTRAVENOUS at 18:40

## 2025-07-15 RX ADMIN — PANTOPRAZOLE SODIUM 40 MG: 40 INJECTION, POWDER, LYOPHILIZED, FOR SOLUTION INTRAVENOUS at 17:07

## 2025-07-15 RX ADMIN — APIXABAN 10 MG: 5 TABLET, FILM COATED ORAL at 21:24

## 2025-07-15 RX ADMIN — HYDROMORPHONE HYDROCHLORIDE 0.5 MG: 1 INJECTION, SOLUTION INTRAMUSCULAR; INTRAVENOUS; SUBCUTANEOUS at 21:24

## 2025-07-15 RX ADMIN — HYDROMORPHONE HYDROCHLORIDE 0.5 MG: 1 INJECTION, SOLUTION INTRAMUSCULAR; INTRAVENOUS; SUBCUTANEOUS at 13:49

## 2025-07-15 RX ADMIN — SODIUM CHLORIDE, POTASSIUM CHLORIDE, SODIUM LACTATE AND CALCIUM CHLORIDE 150 ML/HR: 600; 310; 30; 20 INJECTION, SOLUTION INTRAVENOUS at 00:01

## 2025-07-15 RX ADMIN — HYDROMORPHONE HYDROCHLORIDE 0.5 MG: 1 INJECTION, SOLUTION INTRAMUSCULAR; INTRAVENOUS; SUBCUTANEOUS at 08:40

## 2025-07-15 RX ADMIN — Medication 5 MG: at 21:24

## 2025-07-15 RX ADMIN — SODIUM CHLORIDE, POTASSIUM CHLORIDE, SODIUM LACTATE AND CALCIUM CHLORIDE 150 ML/HR: 600; 310; 30; 20 INJECTION, SOLUTION INTRAVENOUS at 21:24

## 2025-07-15 RX ADMIN — SUCRALFATE 1 G: 1 TABLET ORAL at 17:07

## 2025-07-15 RX ADMIN — Medication 10 ML: at 08:40

## 2025-07-15 RX ADMIN — HYDROMORPHONE HYDROCHLORIDE 0.5 MG: 1 INJECTION, SOLUTION INTRAMUSCULAR; INTRAVENOUS; SUBCUTANEOUS at 17:07

## 2025-07-15 RX ADMIN — Medication 10 ML: at 21:24

## 2025-07-15 RX ADMIN — HYDROMORPHONE HYDROCHLORIDE 0.5 MG: 1 INJECTION, SOLUTION INTRAMUSCULAR; INTRAVENOUS; SUBCUTANEOUS at 00:12

## 2025-07-15 RX ADMIN — SUCRALFATE 1 G: 1 TABLET ORAL at 12:01

## 2025-07-15 RX ADMIN — SUCRALFATE 1 G: 1 TABLET ORAL at 21:24

## 2025-07-15 RX ADMIN — DICYCLOMINE HYDROCHLORIDE 10 MG: 10 CAPSULE ORAL at 21:24

## 2025-07-15 RX ADMIN — HYDROMORPHONE HYDROCHLORIDE 0.5 MG: 1 INJECTION, SOLUTION INTRAMUSCULAR; INTRAVENOUS; SUBCUTANEOUS at 10:58

## 2025-07-15 RX ADMIN — PANTOPRAZOLE SODIUM 40 MG: 40 INJECTION, POWDER, LYOPHILIZED, FOR SOLUTION INTRAVENOUS at 08:40

## 2025-07-15 RX ADMIN — APIXABAN 5 MG: 5 TABLET, FILM COATED ORAL at 08:40

## 2025-07-15 RX ADMIN — SUCRALFATE 1 G: 1 TABLET ORAL at 08:40

## 2025-07-15 RX ADMIN — KETOROLAC TROMETHAMINE 30 MG: 30 INJECTION, SOLUTION INTRAMUSCULAR; INTRAVENOUS at 05:15

## 2025-07-15 RX ADMIN — SENNOSIDES, DOCUSATE SODIUM 2 TABLET: 50; 8.6 TABLET, FILM COATED ORAL at 08:47

## 2025-07-15 RX ADMIN — DICYCLOMINE HYDROCHLORIDE 10 MG: 10 CAPSULE ORAL at 17:07

## 2025-07-15 RX ADMIN — SODIUM CHLORIDE, POTASSIUM CHLORIDE, SODIUM LACTATE AND CALCIUM CHLORIDE 150 ML/HR: 600; 310; 30; 20 INJECTION, SOLUTION INTRAVENOUS at 13:58

## 2025-07-15 RX ADMIN — ONDANSETRON 4 MG: 2 INJECTION, SOLUTION INTRAMUSCULAR; INTRAVENOUS at 13:49

## 2025-07-15 RX ADMIN — SODIUM CHLORIDE, POTASSIUM CHLORIDE, SODIUM LACTATE AND CALCIUM CHLORIDE 150 ML/HR: 600; 310; 30; 20 INJECTION, SOLUTION INTRAVENOUS at 06:53

## 2025-07-15 NOTE — PLAN OF CARE
Goal Outcome Evaluation:  Plan of Care Reviewed With: patient        Progress: improving  Outcome Evaluation: Alert and oriented x4. Complaints of pain, medicated per mar. x1 complaint of nausea, medicated per mar. Bowel regimen followed. Continuous fluids infusing per mar.

## 2025-07-15 NOTE — CONSULTS
"Nutrition Services    Patient Name: Aubree Krueger  YOB: 1985  MRN: [id number]  Admission date: 7/10/2025      CLINICAL NUTRITION ASSESSMENT      Reason for Assessment  MST Score 2+     H&P:  Past Medical History:   Diagnosis Date    Anemia 2003 1st pregnancy    Bipolar disorder 2000    Depression     Hepatitis C     finished tx 2021    PONV (postoperative nausea and vomiting) 2004    Substance abuse 2007    Varicella         Current Problems:   Active Hospital Problems    Diagnosis     **Pancreatitis         Nutrition/Diet History         Narrative   Admitted for abdominal pain, nausea, and emesis. Poor po intake reported. Boost Breeze TID ordered for nutrition support. Full liquid diet currently in place. Advance diet as feasible.     Anthropometrics        Current Height, Weight Height: 172.7 cm (68\")  Weight: 56.9 kg (125 lb 7.1 oz)   Current BMI Body mass index is 19.07 kg/m².   BMI Classification Normal range   % IBW 63.9 kg   Adjusted Body Weight (ABW)    Weight Hx  Wt Readings from Last 30 Encounters:   07/10/25 1802 56.9 kg (125 lb 7.1 oz)   07/10/25 1209 56.1 kg (123 lb 10.9 oz)   06/20/25 1330 57.7 kg (127 lb 3.3 oz)   06/20/25 0900 58.5 kg (128 lb 15.5 oz)   03/15/25 1315 57.9 kg (127 lb 10.3 oz)   03/05/25 1130 59.6 kg (131 lb 6.4 oz)   12/26/24 0923 58 kg (127 lb 13.9 oz)   12/26/24 0914 58.1 kg (128 lb 1.4 oz)   06/11/24 0931 54 kg (119 lb)   05/26/24 1132 55 kg (121 lb 4.1 oz)   03/29/24 1835 55 kg (121 lb 4.1 oz)   12/06/23 0911 54.9 kg (121 lb)   10/18/23 1126 55.3 kg (122 lb)   04/17/23 0856 57.2 kg (126 lb)   04/12/23 1123 58.1 kg (128 lb)   03/02/23 1546 66.2 kg (146 lb)   02/27/23 0827 66.4 kg (146 lb 6.4 oz)   02/13/23 0823 65.3 kg (144 lb)   01/31/23 0824 65.8 kg (145 lb)   01/16/23 1344 64.9 kg (143 lb)   12/14/22 1324 64.4 kg (142 lb)   11/15/22 1409 62.1 kg (137 lb)   11/14/22 1001 63 kg (139 lb)   10/11/22 0907 63 kg (139 lb)   09/13/22 0902 63 kg (139 lb)   08/18/22 " 0822 64.9 kg (143 lb)   10/11/18 1430 55.8 kg (123 lb)          Wt Change Observation UBW of 125-130 lbs     Estimated/Assessed Needs  Estimated Needs based on: Current Body Weight       Energy Requirements 25-30 kcal/kg   EST Needs (kcal/day) 6431-2464 kcal       Protein Requirements 1.0-1.2 g/kg   EST Daily Needs (g/day) 56-68 g       Fluid Requirements 1 ml/kcal    Estimated Needs (mL/day) 3341-2727 ml     Labs/Medications         Pertinent Labs Reviewed.   Results from last 7 days   Lab Units 07/15/25  0426 07/14/25  0419 07/13/25  0409 07/11/25  0456 07/10/25  1244   SODIUM mmol/L 142 140 141   < > 133*   POTASSIUM mmol/L 4.2 3.8 3.4*   < > 4.1   CHLORIDE mmol/L 110* 108* 107   < > 99   CO2 mmol/L 25.1 23.0 24.0   < > 22.8   BUN mg/dL <2.0* <2.0* 2.7*   < > 6.7   CREATININE mg/dL 0.77 0.76 0.77   < > 0.72   CALCIUM mg/dL 9.6 9.8 9.4   < > 10.7*   BILIRUBIN mg/dL 0.2  --   --   --  0.5   ALK PHOS U/L 84  --   --   --  120*   ALT (SGPT) U/L 20  --   --   --  18   AST (SGOT) U/L 20  --   --   --  23   GLUCOSE mg/dL 79 82 87   < > 90    < > = values in this interval not displayed.     Results from last 7 days   Lab Units 07/15/25  0426 07/13/25  0409 07/12/25  0829 07/11/25  0456 07/10/25  1244   MAGNESIUM mg/dL 2.0  --  1.8 1.9 2.1   PHOSPHORUS mg/dL 3.4  --  3.0 2.9 3.1   HEMOGLOBIN g/dL 9.6*   < > 10.5* 9.8* 12.2   HEMATOCRIT % 31.5*   < > 33.6* 31.7* 37.5   TRIGLYCERIDES mg/dL  --   --   --   --  83    < > = values in this interval not displayed.     COVID19   Date Value Ref Range Status   12/26/2024 Not Detected Not Detected - Ref. Range Final     Lab Results   Component Value Date    HGBA1C 5.2 12/06/2023         Pertinent Medications Reviewed.     Malnutrition Severity Assessment              Nutrition Diagnosis         Nutrition Dx Problem 1 Inadequate oral Intake related to decreased ability to consume sufficient energy as evidenced by clear liquid diet.     Nutrition Intervention           Current  Nutrition Orders & Evaluation of Intake       Current PO Diet Diet: Regular/House, Gastrointestinal, Liquid; Full Liquid; Fat-Restricted; Texture: Regular (IDDSI 7); Fluid Consistency: Thin (IDDSI 0)   Supplement Orders Placed This Encounter      Dietary Nutrition Supplements Boost Breeze (Ensure Clear); [place]           Nutrition Intervention/Prescription        Advance diet as feasible  Boost Breeze TID (250 kcal, 9 g pro)        Medical Nutrition Therapy/Nutrition Education          Learner     Readiness Patient  Acceptance     Method     Response Explanation  Verbalizes understanding     Monitor/Evaluation        Monitor Per protocol, I&O, PO intake, GI status     Nutrition Discharge Plan         To be determined     Electronically signed by:  Cathy Busby RD  07/15/25 09:51 EDT

## 2025-07-15 NOTE — PROGRESS NOTES
[facility]   Hospitalist Progress Note  Date: 7/15/2025  Patient Name: Aubree Krueger  : 1985  MRN: [id number]  Date of admission: 7/10/2025  Room/Bed: [facility]      Subjective   Subjective   Chief Complaint: abdominal pain     Summary:  Aubree Krueger is a 40 y.o. female who presents with severe abdominal pain, nausea, vomiting.  Patient was recently diagnosed with pancreatitis.  She is unable to tolerate food without severe abdominal pain.  Patient states she has not drank alcohol since prior hospitalization. On arrival to the ED, patient temperature 98.4, pulse 87, respiratory rate of 18, blood pressure 91/59, and she saturating 100% on room air. Findings of CT abdomen and pelvis: There are findings still compatible with acute pancreatitis.  There is no pancreatic pseudocyst abscess or necrosis.  The degree of peripancreatic inflammatory change is decreased.  Interval development of partial acute/subacute venous thrombosis of the main portal vein at the portal venous confluence.  There is also similarly appearing occlusive thrombus involving the proximal superior mesenteric vein extending into the branch vessels.  Thickening of the wall of the descending limb of the duodenal C-loop probably representing reactive duodenitis.  Hepatic steatosis.  Hepatosplenomegaly.  Trace amount of free fluid.    Interval Followup:   No acute events overnight.  Has been able to tolerate full liquid diet a little better without vomiting.  Feels like the IV PPI helps.    Objective   Objective     Vitals:   Temp:  [97.7 °F (36.5 °C)-98.1 °F (36.7 °C)] 98.1 °F (36.7 °C)  Heart Rate:  [] 51  Resp:  [18-20] 18  BP: (102-124)/(58-82) 124/82    Physical Exam   General: Awake, alert, NAD  HENT: NCAT, MMM  Cardiovascular: RRR, no murmurs   Pulmonary: CTAB, no W/R/R  Gastrointestinal: TTP throughout, nondistended  Musculoskeletal: No gross deformities  Skin: No jaundice, no rash on exposed skin appreciated  Neuro: CN II  through XII grossly intact; speech clear; no tremor    Result Review    I have personally reviewed these results:  [x]  Laboratory personally reviewed CMP, CBC, lipase magnesium, phosphorus      Lab 07/15/25  0426 07/14/25  0419 07/13/25  0409 07/12/25  0829 07/12/25  0159 07/11/25  2018 07/10/25  2139 07/10/25  1244   WBC 3.55 3.77 4.21 3.92  --   --    < > 7.69   HEMOGLOBIN 9.6* 10.2* 9.7* 10.5*  --   --    < > 12.2   HEMATOCRIT 31.5* 32.9* 30.6* 33.6*  --   --    < > 37.5   PLATELETS 219 225 246 260  --   --    < > 293   NEUTROS ABS  --  2.06 2.83 2.70  --   --    < > 6.12   IMMATURE GRANS (ABS)  --  0.01 0.02 0.02  --   --    < > 0.02   LYMPHS ABS  --  1.10 0.89 0.86  --   --    < > 0.95   MONOS ABS  --  0.41 0.32 0.20  --   --    < > 0.45   EOS ABS  --  0.17 0.13 0.11  --   --    < > 0.12   MCV 99.1* 97.3* 96.2 98.5*  --   --    < > 94.2   PROTIME  --   --   --   --   --   --   --  15.3*   APTT  --   --   --  33.7* 145.8* 93.2   < > 30.3*    < > = values in this interval not displayed.         Lab 07/15/25  0426 07/14/25  0419 07/13/25  0409 07/12/25  0829 07/11/25  0456   SODIUM 142 140 141 141 139   POTASSIUM 4.2 3.8 3.4* 3.5 4.2   CHLORIDE 110* 108* 107 108* 110*   CO2 25.1 23.0 24.0 24.6 23.0   ANION GAP 6.9 9.0 10.0 8.4 6.0   BUN <2.0* <2.0* 2.7* 3.3* 4.8*   CREATININE 0.77 0.76 0.77 0.74 0.78   EGFR 101.4 103.0 101.4 106.4 99.8   GLUCOSE 79 82 87 92 80   CALCIUM 9.6 9.8 9.4 9.7 9.6   MAGNESIUM 2.0  --   --  1.8 1.9   PHOSPHORUS 3.4  --   --  3.0 2.9         Lab 07/15/25  0426 07/11/25  0456 07/10/25  1244   TOTAL PROTEIN 5.7*  --  7.1   ALBUMIN 3.5  --  4.5   GLOBULIN 2.2  --  2.6   ALT (SGPT) 20  --  18   AST (SGOT) 20  --  23   BILIRUBIN 0.2  --  0.5   ALK PHOS 84  --  120*   LIPASE 524* 290* 573*         Lab 07/10/25  1244   PROTIME 15.3*   INR 1.16*         Lab 07/10/25  1244   TRIGLYCERIDES 83         Lab 07/12/25  0829 07/11/25  0456 07/10/25  1244   IRON 40   < >  --    IRON SATURATION (TSAT) 11*    < >  --    TIBC 361   < >  --    TRANSFERRIN 242   < >  --    FERRITIN 108.70   < >  --    FOLATE  --   --  3.72*   VITAMIN B 12  --   --  595    < > = values in this interval not displayed.         Brief Urine Lab Results  (Last result in the past 365 days)        Color   Clarity   Blood   Leuk Est   Nitrite   Protein   CREAT   Urine HCG        07/11/25 1258 Yellow   Clear   Negative   Trace   Negative   Negative                 [x]  Microbiology   [x]  Radiology  [x]  EKG/Telemetry   [x]  Cardiology/Vascular   [x]  Pathology  [x]  Old records  [x]  Other:    Assessment & Plan   Assessment / Plan   Pancreatitis  Portal vein thrombosis likely secondary to inflammation from pancreatitis  History of alcohol abuse  Bipolar disorder  Hepatitis C, treated  Nicotine dependence  History of asthma  Asymptomatic bacteriuria    Continue to monitor in the hospital for workup and management of the above  Continue with LR at 150 cc/h for the next 24 hours  Advance to GI bland diet  Continue with IV PPI, Carafate  Vascular surgery consulted earlier in admission for portal vein thrombosis, recommend anticoagulation  Will load with Eliquis 10 mg twice daily x 7 days, then drop to Eliquis 5 mg twice daily for acute clot  Currently no indication for antibiotics  Continue nicotine patch  Can use Dilaudid or oxycodone as needed for pain  Schedule Bentyl 4 times daily to help with abdominal cramping  Trend renal function and electrolytes with a.m. BMP, magnesium   Trend Hgb and WBC with a.m. CBC     Discussed with RN.    VTE Prophylaxis:  Pharmacologic & mechanical VTE prophylaxis orders are present.        CODE STATUS:   Code Status (Patient has no pulse and is not breathing): CPR (Attempt to Resuscitate)  Medical Interventions (Patient has pulse or is breathing): Full Support  Level Of Support Discussed With: Patient

## 2025-07-15 NOTE — PLAN OF CARE
Goal Outcome Evaluation:  Plan of Care Reviewed With: patient        Progress: improving  Outcome Evaluation: Pt remains A&Ox4, on room air. Up ad linda, ambulating in room and throughout halls this shift. Medicated for c/o abdominal pain per mar throughout shift. No c/o nausea. No needs at this time.

## 2025-07-16 LAB
ANION GAP SERPL CALCULATED.3IONS-SCNC: 8.4 MMOL/L (ref 5–15)
BACTERIA SPEC AEROBE CULT: NORMAL
BACTERIA SPEC AEROBE CULT: NORMAL
BASOPHILS # BLD AUTO: 0.02 10*3/MM3 (ref 0–0.2)
BASOPHILS NFR BLD AUTO: 0.6 % (ref 0–1.5)
BUN SERPL-MCNC: 2.9 MG/DL (ref 6–20)
BUN/CREAT SERPL: 3.5 (ref 7–25)
CALCIUM SPEC-SCNC: 9.3 MG/DL (ref 8.6–10.5)
CHLORIDE SERPL-SCNC: 110 MMOL/L (ref 98–107)
CO2 SERPL-SCNC: 24.6 MMOL/L (ref 22–29)
CREAT SERPL-MCNC: 0.83 MG/DL (ref 0.57–1)
DEPRECATED RDW RBC AUTO: 60 FL (ref 37–54)
EGFRCR SERPLBLD CKD-EPI 2021: 92.7 ML/MIN/1.73
EOSINOPHIL # BLD AUTO: 0.17 10*3/MM3 (ref 0–0.4)
EOSINOPHIL NFR BLD AUTO: 4.7 % (ref 0.3–6.2)
ERYTHROCYTE [DISTWIDTH] IN BLOOD BY AUTOMATED COUNT: 16.6 % (ref 12.3–15.4)
GLUCOSE SERPL-MCNC: 91 MG/DL (ref 65–99)
HCT VFR BLD AUTO: 30.9 % (ref 34–46.6)
HGB BLD-MCNC: 9.6 G/DL (ref 12–15.9)
IMM GRANULOCYTES # BLD AUTO: 0.01 10*3/MM3 (ref 0–0.05)
IMM GRANULOCYTES NFR BLD AUTO: 0.3 % (ref 0–0.5)
LYMPHOCYTES # BLD AUTO: 0.97 10*3/MM3 (ref 0.7–3.1)
LYMPHOCYTES NFR BLD AUTO: 26.9 % (ref 19.6–45.3)
MAGNESIUM SERPL-MCNC: 2 MG/DL (ref 1.6–2.6)
MCH RBC QN AUTO: 30.4 PG (ref 26.6–33)
MCHC RBC AUTO-ENTMCNC: 31.1 G/DL (ref 31.5–35.7)
MCV RBC AUTO: 97.8 FL (ref 79–97)
MONOCYTES # BLD AUTO: 0.4 10*3/MM3 (ref 0.1–0.9)
MONOCYTES NFR BLD AUTO: 11.1 % (ref 5–12)
NEUTROPHILS NFR BLD AUTO: 2.03 10*3/MM3 (ref 1.7–7)
NEUTROPHILS NFR BLD AUTO: 56.4 % (ref 42.7–76)
NRBC BLD AUTO-RTO: 0 /100 WBC (ref 0–0.2)
PHOSPHATE SERPL-MCNC: 3.6 MG/DL (ref 2.5–4.5)
PLATELET # BLD AUTO: 214 10*3/MM3 (ref 140–450)
PMV BLD AUTO: 9.3 FL (ref 6–12)
POTASSIUM SERPL-SCNC: 3.8 MMOL/L (ref 3.5–5.2)
RBC # BLD AUTO: 3.16 10*6/MM3 (ref 3.77–5.28)
SODIUM SERPL-SCNC: 143 MMOL/L (ref 136–145)
WBC NRBC COR # BLD AUTO: 3.6 10*3/MM3 (ref 3.4–10.8)

## 2025-07-16 PROCEDURE — 25010000002 HYDROMORPHONE 1 MG/ML SOLUTION: Performed by: INTERNAL MEDICINE

## 2025-07-16 PROCEDURE — 85025 COMPLETE CBC W/AUTO DIFF WBC: CPT | Performed by: INTERNAL MEDICINE

## 2025-07-16 PROCEDURE — 83735 ASSAY OF MAGNESIUM: CPT | Performed by: INTERNAL MEDICINE

## 2025-07-16 PROCEDURE — 84100 ASSAY OF PHOSPHORUS: CPT | Performed by: INTERNAL MEDICINE

## 2025-07-16 PROCEDURE — 25810000003 LACTATED RINGERS PER 1000 ML: Performed by: INTERNAL MEDICINE

## 2025-07-16 PROCEDURE — 99232 SBSQ HOSP IP/OBS MODERATE 35: CPT | Performed by: INTERNAL MEDICINE

## 2025-07-16 PROCEDURE — 80048 BASIC METABOLIC PNL TOTAL CA: CPT | Performed by: INTERNAL MEDICINE

## 2025-07-16 RX ADMIN — Medication 10 ML: at 20:39

## 2025-07-16 RX ADMIN — HYDROMORPHONE HYDROCHLORIDE 0.5 MG: 1 INJECTION, SOLUTION INTRAMUSCULAR; INTRAVENOUS; SUBCUTANEOUS at 20:39

## 2025-07-16 RX ADMIN — PANTOPRAZOLE SODIUM 40 MG: 40 INJECTION, POWDER, LYOPHILIZED, FOR SOLUTION INTRAVENOUS at 07:40

## 2025-07-16 RX ADMIN — HYDROMORPHONE HYDROCHLORIDE 0.5 MG: 1 INJECTION, SOLUTION INTRAMUSCULAR; INTRAVENOUS; SUBCUTANEOUS at 17:52

## 2025-07-16 RX ADMIN — HYDROMORPHONE HYDROCHLORIDE 0.5 MG: 1 INJECTION, SOLUTION INTRAMUSCULAR; INTRAVENOUS; SUBCUTANEOUS at 12:29

## 2025-07-16 RX ADMIN — SUCRALFATE 1 G: 1 TABLET ORAL at 12:15

## 2025-07-16 RX ADMIN — APIXABAN 10 MG: 5 TABLET, FILM COATED ORAL at 20:38

## 2025-07-16 RX ADMIN — PANTOPRAZOLE SODIUM 40 MG: 40 INJECTION, POWDER, LYOPHILIZED, FOR SOLUTION INTRAVENOUS at 17:52

## 2025-07-16 RX ADMIN — DICYCLOMINE HYDROCHLORIDE 10 MG: 10 CAPSULE ORAL at 12:15

## 2025-07-16 RX ADMIN — Medication 5 MG: at 20:39

## 2025-07-16 RX ADMIN — HYDROMORPHONE HYDROCHLORIDE 0.5 MG: 1 INJECTION, SOLUTION INTRAMUSCULAR; INTRAVENOUS; SUBCUTANEOUS at 15:02

## 2025-07-16 RX ADMIN — HYDROMORPHONE HYDROCHLORIDE 0.5 MG: 1 INJECTION, SOLUTION INTRAMUSCULAR; INTRAVENOUS; SUBCUTANEOUS at 10:14

## 2025-07-16 RX ADMIN — DICYCLOMINE HYDROCHLORIDE 10 MG: 10 CAPSULE ORAL at 17:52

## 2025-07-16 RX ADMIN — DICYCLOMINE HYDROCHLORIDE 10 MG: 10 CAPSULE ORAL at 20:38

## 2025-07-16 RX ADMIN — POLYETHYLENE GLYCOL 3350 17 G: 17 POWDER, FOR SOLUTION ORAL at 07:40

## 2025-07-16 RX ADMIN — BISACODYL 5 MG: 5 TABLET, COATED ORAL at 20:39

## 2025-07-16 RX ADMIN — SUCRALFATE 1 G: 1 TABLET ORAL at 20:39

## 2025-07-16 RX ADMIN — SUCRALFATE 1 G: 1 TABLET ORAL at 17:52

## 2025-07-16 RX ADMIN — HYDROMORPHONE HYDROCHLORIDE 0.5 MG: 1 INJECTION, SOLUTION INTRAMUSCULAR; INTRAVENOUS; SUBCUTANEOUS at 02:50

## 2025-07-16 RX ADMIN — SODIUM CHLORIDE, POTASSIUM CHLORIDE, SODIUM LACTATE AND CALCIUM CHLORIDE 150 ML/HR: 600; 310; 30; 20 INJECTION, SOLUTION INTRAVENOUS at 10:14

## 2025-07-16 RX ADMIN — HYDROMORPHONE HYDROCHLORIDE 0.5 MG: 1 INJECTION, SOLUTION INTRAMUSCULAR; INTRAVENOUS; SUBCUTANEOUS at 07:40

## 2025-07-16 RX ADMIN — DICYCLOMINE HYDROCHLORIDE 10 MG: 10 CAPSULE ORAL at 07:40

## 2025-07-16 RX ADMIN — APIXABAN 10 MG: 5 TABLET, FILM COATED ORAL at 09:54

## 2025-07-16 RX ADMIN — SUCRALFATE 1 G: 1 TABLET ORAL at 07:40

## 2025-07-16 NOTE — PROGRESS NOTES
[facility]   Hospitalist Progress Note  Date: 2025  Patient Name: Aubree Krueger  : 1985  MRN: [id number]  Date of admission: 7/10/2025  Room/Bed: [facility]      Subjective   Subjective   Chief Complaint: abdominal pain     Summary:  Aubree Krueger is a 40 y.o. female who presents with severe abdominal pain, nausea, vomiting.  Patient was recently diagnosed with pancreatitis.  She is unable to tolerate food without severe abdominal pain.  Patient states she has not drank alcohol since prior hospitalization. On arrival to the ED, patient temperature 98.4, pulse 87, respiratory rate of 18, blood pressure 91/59, and she saturating 100% on room air. Findings of CT abdomen and pelvis: There are findings still compatible with acute pancreatitis.  There is no pancreatic pseudocyst abscess or necrosis.  The degree of peripancreatic inflammatory change is decreased.  Interval development of partial acute/subacute venous thrombosis of the main portal vein at the portal venous confluence.  There is also similarly appearing occlusive thrombus involving the proximal superior mesenteric vein extending into the branch vessels.  Thickening of the wall of the descending limb of the duodenal C-loop probably representing reactive duodenitis.  Hepatic steatosis.  Hepatosplenomegaly.  Trace amount of free fluid.    Interval Followup:   No acute events overnight.  Had some worsening nausea yesterday after lunch but did a little better with dinner and breakfast this morning.  Has been tolerating the GI bland diet but only able to eat small amounts.  Has not had a bowel movement yet but feels like things are moving.    Objective   Objective     Vitals:   Temp:  [97.7 °F (36.5 °C)-98.2 °F (36.8 °C)] 97.7 °F (36.5 °C)  Heart Rate:  [51-66] 56  Resp:  [18] 18  BP: ()/(64-87) 119/87    Physical Exam   General: Awake, alert, NAD  HENT: NCAT, MMM  Cardiovascular: RRR, no MRG  Pulmonary: CTAB, no W/R/R  Gastrointestinal:  Tenderness improving, nondistended  Musculoskeletal: No gross deformities  Skin: No jaundice, no rash on exposed skin appreciated  Neuro: CN II through XII grossly intact; speech clear; no tremor    Result Review    I have personally reviewed these results:  [x]  Laboratory personally reviewed CMP, CBC, magnesium, phosphorus      Lab 07/16/25  0425 07/15/25  0426 07/14/25  0419 07/13/25  0409 07/12/25  0829 07/12/25  0159 07/11/25  2018 07/10/25  2139 07/10/25  1244   WBC 3.60 3.55 3.77 4.21 3.92  --   --    < > 7.69   HEMOGLOBIN 9.6* 9.6* 10.2* 9.7* 10.5*  --   --    < > 12.2   HEMATOCRIT 30.9* 31.5* 32.9* 30.6* 33.6*  --   --    < > 37.5   PLATELETS 214 219 225 246 260  --   --    < > 293   NEUTROS ABS 2.03  --  2.06 2.83 2.70  --   --    < > 6.12   IMMATURE GRANS (ABS) 0.01  --  0.01 0.02 0.02  --   --    < > 0.02   LYMPHS ABS 0.97  --  1.10 0.89 0.86  --   --    < > 0.95   MONOS ABS 0.40  --  0.41 0.32 0.20  --   --    < > 0.45   EOS ABS 0.17  --  0.17 0.13 0.11  --   --    < > 0.12   MCV 97.8* 99.1* 97.3* 96.2 98.5*  --   --    < > 94.2   PROTIME  --   --   --   --   --   --   --   --  15.3*   APTT  --   --   --   --  33.7* 145.8* 93.2   < > 30.3*    < > = values in this interval not displayed.         Lab 07/16/25  0425 07/15/25  0426 07/14/25  0419 07/13/25  0409 07/12/25  0829   SODIUM 143 142 140   < > 141   POTASSIUM 3.8 4.2 3.8   < > 3.5   CHLORIDE 110* 110* 108*   < > 108*   CO2 24.6 25.1 23.0   < > 24.6   ANION GAP 8.4 6.9 9.0   < > 8.4   BUN 2.9* <2.0* <2.0*   < > 3.3*   CREATININE 0.83 0.77 0.76   < > 0.74   EGFR 92.7 101.4 103.0   < > 106.4   GLUCOSE 91 79 82   < > 92   CALCIUM 9.3 9.6 9.8   < > 9.7   MAGNESIUM 2.0 2.0  --   --  1.8   PHOSPHORUS 3.6 3.4  --   --  3.0    < > = values in this interval not displayed.         Lab 07/15/25  0426 07/11/25  0456 07/10/25  1244   TOTAL PROTEIN 5.7*  --  7.1   ALBUMIN 3.5  --  4.5   GLOBULIN 2.2  --  2.6   ALT (SGPT) 20  --  18   AST (SGOT) 20  --  23    BILIRUBIN 0.2  --  0.5   ALK PHOS 84  --  120*   LIPASE 524* 290* 573*         Lab 07/10/25  1244   PROTIME 15.3*   INR 1.16*         Lab 07/10/25  1244   TRIGLYCERIDES 83         Lab 07/12/25  0829 07/11/25  0456 07/10/25  1244   IRON 40   < >  --    IRON SATURATION (TSAT) 11*   < >  --    TIBC 361   < >  --    TRANSFERRIN 242   < >  --    FERRITIN 108.70   < >  --    FOLATE  --   --  3.72*   VITAMIN B 12  --   --  595    < > = values in this interval not displayed.         Brief Urine Lab Results  (Last result in the past 365 days)        Color   Clarity   Blood   Leuk Est   Nitrite   Protein   CREAT   Urine HCG        07/11/25 1258 Yellow   Clear   Negative   Trace   Negative   Negative                 [x]  Microbiology   [x]  Radiology  [x]  EKG/Telemetry   [x]  Cardiology/Vascular   [x]  Pathology  [x]  Old records  [x]  Other:    Assessment & Plan   Assessment / Plan   Pancreatitis  Portal vein thrombosis likely secondary to inflammation from pancreatitis  History of alcohol abuse  Bipolar disorder  Hepatitis C, treated  Nicotine dependence  History of asthma  Asymptomatic bacteriuria    Continue to monitor in the hospital for workup and management of the above  DC IV fluids and monitor  Continue with GI bland diet  Continue with IV PPI, Carafate  With Eliquis 10 mg twice daily x 7 days, then drop to Eliquis 5 mg twice daily for acute clot  Continue nicotine patch  Can use Dilaudid or oxycodone as needed for pain  Continue with scheduled Bentyl 4 times daily to help with abdominal cramping  Trend renal function and electrolytes with a.m. BMP, magnesium   Trend Hgb and WBC with a.m. CBC     Discussed with RN.    VTE Prophylaxis:  Pharmacologic & mechanical VTE prophylaxis orders are present.        CODE STATUS:   Code Status (Patient has no pulse and is not breathing): CPR (Attempt to Resuscitate)  Medical Interventions (Patient has pulse or is breathing): Full Support  Level Of Support Discussed With:  Patient

## 2025-07-16 NOTE — PLAN OF CARE
Goal Outcome Evaluation:  Plan of Care Reviewed With: patient        Progress: no change  Outcome Evaluation: patient alert and oriented x4, rested in bed througout the day and up ad linda in the hallway with frequent complaitns of pain and discomfort in her abdomen. Medicated frequently for pain with IV medication. IV fluids discontinued per order. Met with SW about insurance paperwork. Miralax given for complaints of constipation. Patient walked multiple laps in the hallway today to try and have a bowel movement. Also, drank coffee as a natural laxative with no luck on having BM. Continue plan of care, safety precautions in place.

## 2025-07-16 NOTE — PLAN OF CARE
Goal Outcome Evaluation:              Outcome Evaluation: alert and oriented x4. vss on RA. medicated x2 for c/o abdominal pain. continuous fluids maintained. up adlib in room. no new issues/needs at this time.

## 2025-07-17 ENCOUNTER — READMISSION MANAGEMENT (OUTPATIENT)
Dept: CALL CENTER | Facility: HOSPITAL | Age: 40
End: 2025-07-17

## 2025-07-17 VITALS
TEMPERATURE: 97.5 F | WEIGHT: 125.44 LBS | HEART RATE: 55 BPM | DIASTOLIC BLOOD PRESSURE: 78 MMHG | RESPIRATION RATE: 20 BRPM | BODY MASS INDEX: 19.01 KG/M2 | OXYGEN SATURATION: 100 % | SYSTOLIC BLOOD PRESSURE: 125 MMHG | HEIGHT: 68 IN

## 2025-07-17 LAB
ANION GAP SERPL CALCULATED.3IONS-SCNC: 8.8 MMOL/L (ref 5–15)
BASOPHILS # BLD AUTO: 0.02 10*3/MM3 (ref 0–0.2)
BASOPHILS NFR BLD AUTO: 0.4 % (ref 0–1.5)
BUN SERPL-MCNC: 4.4 MG/DL (ref 6–20)
BUN/CREAT SERPL: 5.2 (ref 7–25)
CALCIUM SPEC-SCNC: 9.9 MG/DL (ref 8.6–10.5)
CHLORIDE SERPL-SCNC: 107 MMOL/L (ref 98–107)
CO2 SERPL-SCNC: 24.2 MMOL/L (ref 22–29)
CREAT SERPL-MCNC: 0.84 MG/DL (ref 0.57–1)
DEPRECATED RDW RBC AUTO: 60.3 FL (ref 37–54)
EGFRCR SERPLBLD CKD-EPI 2021: 91.3 ML/MIN/1.73
EOSINOPHIL # BLD AUTO: 0.18 10*3/MM3 (ref 0–0.4)
EOSINOPHIL NFR BLD AUTO: 4 % (ref 0.3–6.2)
ERYTHROCYTE [DISTWIDTH] IN BLOOD BY AUTOMATED COUNT: 16.8 % (ref 12.3–15.4)
GLUCOSE SERPL-MCNC: 88 MG/DL (ref 65–99)
HCT VFR BLD AUTO: 33.2 % (ref 34–46.6)
HGB BLD-MCNC: 10.5 G/DL (ref 12–15.9)
IMM GRANULOCYTES # BLD AUTO: 0 10*3/MM3 (ref 0–0.05)
IMM GRANULOCYTES NFR BLD AUTO: 0 % (ref 0–0.5)
LYMPHOCYTES # BLD AUTO: 1.51 10*3/MM3 (ref 0.7–3.1)
LYMPHOCYTES NFR BLD AUTO: 33.9 % (ref 19.6–45.3)
MAGNESIUM SERPL-MCNC: 2.2 MG/DL (ref 1.6–2.6)
MCH RBC QN AUTO: 30.8 PG (ref 26.6–33)
MCHC RBC AUTO-ENTMCNC: 31.6 G/DL (ref 31.5–35.7)
MCV RBC AUTO: 97.4 FL (ref 79–97)
MONOCYTES # BLD AUTO: 0.52 10*3/MM3 (ref 0.1–0.9)
MONOCYTES NFR BLD AUTO: 11.7 % (ref 5–12)
NEUTROPHILS NFR BLD AUTO: 2.23 10*3/MM3 (ref 1.7–7)
NEUTROPHILS NFR BLD AUTO: 50 % (ref 42.7–76)
NRBC BLD AUTO-RTO: 0 /100 WBC (ref 0–0.2)
PHOSPHATE SERPL-MCNC: 3.3 MG/DL (ref 2.5–4.5)
PLATELET # BLD AUTO: 222 10*3/MM3 (ref 140–450)
PMV BLD AUTO: 9.4 FL (ref 6–12)
POTASSIUM SERPL-SCNC: 4.1 MMOL/L (ref 3.5–5.2)
RBC # BLD AUTO: 3.41 10*6/MM3 (ref 3.77–5.28)
SODIUM SERPL-SCNC: 140 MMOL/L (ref 136–145)
WBC NRBC COR # BLD AUTO: 4.46 10*3/MM3 (ref 3.4–10.8)

## 2025-07-17 PROCEDURE — 99239 HOSP IP/OBS DSCHRG MGMT >30: CPT | Performed by: INTERNAL MEDICINE

## 2025-07-17 PROCEDURE — 80048 BASIC METABOLIC PNL TOTAL CA: CPT | Performed by: INTERNAL MEDICINE

## 2025-07-17 PROCEDURE — 83735 ASSAY OF MAGNESIUM: CPT | Performed by: INTERNAL MEDICINE

## 2025-07-17 PROCEDURE — 85025 COMPLETE CBC W/AUTO DIFF WBC: CPT | Performed by: INTERNAL MEDICINE

## 2025-07-17 PROCEDURE — 25010000002 HYDROMORPHONE 1 MG/ML SOLUTION: Performed by: INTERNAL MEDICINE

## 2025-07-17 PROCEDURE — 25010000002 ONDANSETRON PER 1 MG: Performed by: INTERNAL MEDICINE

## 2025-07-17 PROCEDURE — 84100 ASSAY OF PHOSPHORUS: CPT | Performed by: INTERNAL MEDICINE

## 2025-07-17 RX ORDER — OXYCODONE HYDROCHLORIDE 10 MG/1
10 TABLET ORAL EVERY 6 HOURS PRN
Qty: 12 TABLET | Refills: 0 | Status: SHIPPED | OUTPATIENT
Start: 2025-07-17 | End: 2025-07-20

## 2025-07-17 RX ORDER — SUCRALFATE ORAL 1 G/10ML
1 SUSPENSION ORAL 4 TIMES DAILY
Qty: 600 ML | Refills: 0 | Status: SHIPPED | OUTPATIENT
Start: 2025-07-17 | End: 2025-08-02

## 2025-07-17 RX ORDER — DICYCLOMINE HYDROCHLORIDE 10 MG/1
10 CAPSULE ORAL 4 TIMES DAILY PRN
Qty: 45 CAPSULE | Refills: 0 | Status: SHIPPED | OUTPATIENT
Start: 2025-07-17 | End: 2025-08-01

## 2025-07-17 RX ORDER — POLYETHYLENE GLYCOL 3350 17 G/17G
17 POWDER, FOR SOLUTION ORAL DAILY
Qty: 476 G | Refills: 0 | Status: SHIPPED | OUTPATIENT
Start: 2025-07-17

## 2025-07-17 RX ORDER — PANTOPRAZOLE SODIUM 40 MG/1
40 TABLET, DELAYED RELEASE ORAL DAILY
Qty: 30 TABLET | Refills: 0 | Status: SHIPPED | OUTPATIENT
Start: 2025-07-17

## 2025-07-17 RX ORDER — MAG HYDROX/ALUMINUM HYD/SIMETH 400-400-40
1 SUSPENSION, ORAL (FINAL DOSE FORM) ORAL ONCE
Status: COMPLETED | OUTPATIENT
Start: 2025-07-17 | End: 2025-07-17

## 2025-07-17 RX ORDER — ONDANSETRON 4 MG/1
4 TABLET, ORALLY DISINTEGRATING ORAL EVERY 6 HOURS PRN
Qty: 10 TABLET | Refills: 0 | Status: SHIPPED | OUTPATIENT
Start: 2025-07-17

## 2025-07-17 RX ADMIN — HYDROMORPHONE HYDROCHLORIDE 0.5 MG: 1 INJECTION, SOLUTION INTRAMUSCULAR; INTRAVENOUS; SUBCUTANEOUS at 10:50

## 2025-07-17 RX ADMIN — SUCRALFATE 1 G: 1 TABLET ORAL at 10:49

## 2025-07-17 RX ADMIN — ONDANSETRON 4 MG: 2 INJECTION, SOLUTION INTRAMUSCULAR; INTRAVENOUS at 05:27

## 2025-07-17 RX ADMIN — HYDROMORPHONE HYDROCHLORIDE 0.5 MG: 1 INJECTION, SOLUTION INTRAMUSCULAR; INTRAVENOUS; SUBCUTANEOUS at 06:07

## 2025-07-17 RX ADMIN — HYDROMORPHONE HYDROCHLORIDE 0.5 MG: 1 INJECTION, SOLUTION INTRAMUSCULAR; INTRAVENOUS; SUBCUTANEOUS at 00:01

## 2025-07-17 RX ADMIN — SUCRALFATE 1 G: 1 TABLET ORAL at 08:21

## 2025-07-17 RX ADMIN — DICYCLOMINE HYDROCHLORIDE 10 MG: 10 CAPSULE ORAL at 10:49

## 2025-07-17 RX ADMIN — Medication 10 ML: at 08:21

## 2025-07-17 RX ADMIN — APIXABAN 10 MG: 5 TABLET, FILM COATED ORAL at 08:21

## 2025-07-17 RX ADMIN — HYDROMORPHONE HYDROCHLORIDE 0.5 MG: 1 INJECTION, SOLUTION INTRAMUSCULAR; INTRAVENOUS; SUBCUTANEOUS at 08:20

## 2025-07-17 RX ADMIN — PANTOPRAZOLE SODIUM 40 MG: 40 INJECTION, POWDER, LYOPHILIZED, FOR SOLUTION INTRAVENOUS at 08:20

## 2025-07-17 RX ADMIN — GLYCERIN 1 SUPPOSITORY: 2 SUPPOSITORY RECTAL at 10:15

## 2025-07-17 RX ADMIN — DICYCLOMINE HYDROCHLORIDE 10 MG: 10 CAPSULE ORAL at 08:22

## 2025-07-17 RX ADMIN — HYDROMORPHONE HYDROCHLORIDE 0.5 MG: 1 INJECTION, SOLUTION INTRAMUSCULAR; INTRAVENOUS; SUBCUTANEOUS at 03:34

## 2025-07-17 NOTE — CONSULTS
Discharge Planning Assessment  [facility]     Patient Name: Aubree Krueger  MRN: [id number]  Today's Date: 7/17/2025    Admit Date: 7/10/2025     Discharge Plan       Row Name 07/17/25 1341       Plan    Patient/Family in Agreement with Plan yes    Final Discharge Disposition Code 01 - home or self-care    Final Note Pt discharging home today. SW provided Provision of Meds to [facility] due to pt being self-pay and lower income. Provision for $86.60. No additional needs noted at this time.      Row Name 07/17/25 1052       Plan    Plan Pt provided SW with Financial Assistance Application-  faxed to Financial Services.    Patient/Family in Agreement with Plan yes             Expected Discharge Date and Time       Expected Discharge Date Expected Discharge Time    Jul 17, 2025    FLORENTINO Chacon

## 2025-07-17 NOTE — PLAN OF CARE
Goal Outcome Evaluation:  Plan of Care Reviewed With: patient        Progress: improving  Outcome Evaluation: patient alert and oriented, rested in bed and up ad linda in hallways throughout the day with intermittent complaints of pain and discomfort. Medicated per  MAR. On room air. Patient had bowel movement after self administering suppository herself today and stated she felt much better afterward. Follow up appointments made, IV removed, discharge instructions provided. Discharging by personal vehicle, meds to beds to bring new medications to bedside. SW working on affording new medication and payment options. No concerns.

## 2025-07-17 NOTE — DISCHARGE SUMMARY
[facility]         HOSPITALIST  DISCHARGE SUMMARY    Patient Name: Aubree Krueger  : 1985  MRN: [id number]    Date of Admission: 7/10/2025  Date of Discharge: 2025  Primary Care Physician: Provider, No Known    Consults       Date and Time Order Name Status Description    2025  4:44 PM Inpatient Vascular Surgery Consult Completed     2025 12:04 PM Inpatient Gastroenterology Consult Completed     7/10/2025  3:05 PM Hospitalist (on-call MD unless specified)      7/10/2025  3:01 PM IP General Consult (Use specialty-specific consult if known)              Active and Resolved Hospital Problems:  Active Hospital Problems    Diagnosis POA    **Pancreatitis [K85.90] Yes      Resolved Hospital Problems   No resolved problems to display.   Acute alcoholic pancreatitis  Portal vein thrombosis likely secondary to inflammation from pancreatitis  History of alcohol abuse  Intractable nausea and vomiting secondary to pancreatitis  Decreased oral intake  Bipolar disorder  Hepatitis C, treated  Nicotine dependence  History of asthma  Asymptomatic bacteriuria    Hospital Course     Hospital Course:  Aubree Krueger is a 40 y.o. female who presents with severe abdominal pain, nausea, vomiting. Patient was recently diagnosed with pancreatitis. She is unable to tolerate food without severe abdominal pain. Patient states she has not drank alcohol since prior hospitalization. On arrival to the ED, patient temperature 98.4, pulse 87, respiratory rate of 18, blood pressure 91/59, and she saturating 100% on room air. Findings of CT abdomen and pelvis: There are findings still compatible with acute pancreatitis. There is no pancreatic pseudocyst abscess or necrosis. The degree of peripancreatic inflammatory change is decreased. Interval development of partial acute/subacute venous thrombosis of the main portal vein at the portal venous confluence. There is also similarly appearing occlusive thrombus  involving the proximal superior mesenteric vein extending into the branch vessels. Thickening of the wall of the descending limb of the duodenal C-loop probably representing reactive duodenitis. Hepatic steatosis. Hepatosplenomegaly. Trace amount of free fluid.  She was admitted for further care, vascular surgery and gastroenterology were consulted.  Gastroenterology did not feel she required EGD at this time but may benefit on an outpatient basis.  Vascular surgery recommended anticoagulation.  She was started on Eliquis for acute blood clot.  May only need 3 months of treatment as this is a provoked clot but will defer this decision on outpatient basis.  Initially, she had significant issues with intractable nausea and vomiting.  She was started on PPI twice daily, Carafate, Bentyl with improvement.  On the day of discharge, she was tolerating a GI bland diet.  She was discharged home in stable condition on 7/17/2025.  Recommend follow-up with PCP within 1 week, gastroenterology and vascular surgery within 1 month.  She was counseled on tobacco cessation prior to discharge.      Day of Discharge     Vital Signs:  Temp:  [97.5 °F (36.4 °C)-98.7 °F (37.1 °C)] 97.5 °F (36.4 °C)  Heart Rate:  [53-57] 55  Resp:  [18-20] 20  BP: (107-132)/(67-80) 125/78  Physical Exam:   General: Awake, alert, NAD  HENT: NCAT, MMM  Cardiovascular: RRR, no MRG  Pulmonary: CTAB, no W/R/R  Gastrointestinal: Improved but persistent tenderness, nondistended  Musculoskeletal: No gross deformities  Skin: No jaundice, no rash on exposed skin appreciated  Neuro: CN II through XII grossly intact; speech clear    Discharge Details        Discharge Medications        New Medications        Instructions Start Date   apixaban 5 MG tablet tablet  Commonly known as: ELIQUIS   Take 2 tablets by mouth 2 (Two) Times a Day for 5 days, THEN 1 tablet 2 (Two) Times a Day for 25 days. Indications: DVT/PE (active thrombosis)   Start Date: July 17, 2025      dicyclomine 10 MG capsule  Commonly known as: BENTYL   10 mg, Oral, 4 Times Daily PRN      ondansetron ODT 4 MG disintegrating tablet  Commonly known as: ZOFRAN-ODT   4 mg, Oral, Every 6 Hours PRN      oxyCODONE 10 MG tablet  Commonly known as: ROXICODONE   10 mg, Oral, Every 6 Hours PRN      pantoprazole 40 MG EC tablet  Commonly known as: PROTONIX   40 mg, Oral, Daily      polyethylene glycol 17 g packet  Commonly known as: MIRALAX   17 g, Oral, Daily      sucralfate 1 GM/10ML suspension  Commonly known as: Carafate   1 g, Oral, 4 Times Daily             Continue These Medications        Instructions Start Date   albuterol sulfate  (90 Base) MCG/ACT inhaler  Commonly known as: PROVENTIL HFA;VENTOLIN HFA;PROAIR HFA   2 puffs, Every 4 Hours PRN               Allergies   Allergen Reactions    Sulfa Antibiotics Anaphylaxis       Discharge Disposition:  Home or Self Care    Diet:  Hospital:  Diet Order   Procedures    Diet: Gastrointestinal; Fiber-Restricted, Low Irritant; Texture: Soft to Chew (NDD 3); Soft to Chew: Whole Meat; Fluid Consistency: Thin (IDDSI 0)       Discharge Activity:   Activity Instructions       Activity as Tolerated              CODE STATUS:  Code Status and Medical Interventions: CPR (Attempt to Resuscitate); Full Support   Ordered at: 07/10/25 5395     Code Status (Patient has no pulse and is not breathing):    CPR (Attempt to Resuscitate)     Medical Interventions (Patient has pulse or is breathing):    Full Support     Level Of Support Discussed With:    Patient         No future appointments.    Additional Instructions for the Follow-ups that You Need to Schedule       Discharge Follow-up with PCP   As directed       Currently Documented PCP:    Provider, No Known    PCP Phone Number:    None     Follow Up Details: 3-5 days                Pertinent  and/or Most Recent Results     PROCEDURES:   None    LAB RESULTS:      Lab 07/17/25  0418 07/16/25  0425 07/15/25  0426 07/14/25  0419  07/13/25  0409 07/12/25  0829 07/12/25  0159 07/11/25 2018 07/11/25  1230 07/11/25  0456   WBC 4.46 3.60 3.55 3.77 4.21 3.92  --   --   --  3.94   HEMOGLOBIN 10.5* 9.6* 9.6* 10.2* 9.7* 10.5*  --   --   --  9.8*   HEMATOCRIT 33.2* 30.9* 31.5* 32.9* 30.6* 33.6*  --   --   --  31.7*   PLATELETS 222 214 219 225 246 260  --   --   --  244   NEUTROS ABS 2.23 2.03  --  2.06 2.83 2.70  --   --   --  2.18   IMMATURE GRANS (ABS) 0.00 0.01  --  0.01 0.02 0.02  --   --   --  0.01   LYMPHS ABS 1.51 0.97  --  1.10 0.89 0.86  --   --   --  1.22   MONOS ABS 0.52 0.40  --  0.41 0.32 0.20  --   --   --  0.34   EOS ABS 0.18 0.17  --  0.17 0.13 0.11  --   --   --  0.16   MCV 97.4* 97.8* 99.1* 97.3* 96.2 98.5*  --   --   --  98.8*   APTT  --   --   --   --   --  33.7* 145.8* 93.2 75.7* 69.4*         Lab 07/17/25  0418 07/16/25  0425 07/15/25  0426 07/14/25  0419 07/13/25  0409 07/12/25  0829 07/11/25  0456   SODIUM 140 143 142 140 141 141 139   POTASSIUM 4.1 3.8 4.2 3.8 3.4* 3.5 4.2   CHLORIDE 107 110* 110* 108* 107 108* 110*   CO2 24.2 24.6 25.1 23.0 24.0 24.6 23.0   ANION GAP 8.8 8.4 6.9 9.0 10.0 8.4 6.0   BUN 4.4* 2.9* <2.0* <2.0* 2.7* 3.3* 4.8*   CREATININE 0.84 0.83 0.77 0.76 0.77 0.74 0.78   EGFR 91.3 92.7 101.4 103.0 101.4 106.4 99.8   GLUCOSE 88 91 79 82 87 92 80   CALCIUM 9.9 9.3 9.6 9.8 9.4 9.7 9.6   MAGNESIUM 2.2 2.0 2.0  --   --  1.8 1.9   PHOSPHORUS 3.3 3.6 3.4  --   --  3.0 2.9         Lab 07/15/25  0426 07/11/25  0456   TOTAL PROTEIN 5.7*  --    ALBUMIN 3.5  --    GLOBULIN 2.2  --    ALT (SGPT) 20  --    AST (SGOT) 20  --    BILIRUBIN 0.2  --    ALK PHOS 84  --    LIPASE 524* 290*                 Lab 07/12/25  0829   IRON 40   IRON SATURATION (TSAT) 11*   TIBC 361   TRANSFERRIN 242   FERRITIN 108.70         Brief Urine Lab Results  (Last result in the past 365 days)        Color   Clarity   Blood   Leuk Est   Nitrite   Protein   CREAT   Urine HCG        07/11/25 1258 Yellow   Clear   Negative   Trace   Negative    Negative                 Microbiology Results (last 10 days)       Procedure Component Value - Date/Time    Blood Culture - Blood, Hand, Left [[id number]]  (Normal) Collected: 07/11/25 1230    Lab Status: Final result Specimen: Blood from Hand, Left Updated: 07/16/25 1245     Blood Culture No growth at 5 days    Blood Culture - Blood, Arm, Right [[id number]]  (Normal) Collected: 07/11/25 1230    Lab Status: Final result Specimen: Blood from Arm, Right Updated: 07/16/25 1245     Blood Culture No growth at 5 days            CT Abdomen Pelvis Without Contrast  Result Date: 7/14/2025  There are findings of acute pancreatitis that are similar to prior. No new fluid collection is demonstrated. Cannot assess for pancreatic necrosis without IV contrast, and previously demonstrated portal and superior mesenteric vein thrombosis cannot be reassessed without IV contrast Electronically Signed: Nasir Weber  7/14/2025 1:43 PM EDT  Workstation ID: [id number]    CT Abdomen Pelvis With Contrast  Result Date: 7/10/2025  Impression: 1.There are findings still compatible with acute pancreatitis. There is no pancreatic pseudocyst, abscess, or necrosis. The degree of peripancreatic inflammatory change has decreased. 2.Interval development of partial acute/subacute venous thrombosis of the main portal vein at the portal venous confluence. There is also similar appearing partially occlusive thrombus involving proximal superior mesenteric vein extending into one of the  branch vessels. 3.Thickening of the wall of the descending limb of the duodenal C-loop probably representing reactive duodenitis. 4.Hepatic steatosis. 5.Hepatosplenomegaly. 6.Trace amount of free pelvic fluid. 7.The abnormal results were discussed with DAMIAN Ellis by telephone. Electronically Signed: Tyrese Roberson MD  7/10/2025 2:33 PM EDT  Workstation ID: [id number]                   Labs Pending at Discharge:      The ASCVD Risk score ([place] DK, et al., 2019) failed to  calculate for the following reasons:    The 2019 ASCVD risk score is only valid for ages 40 to 79     Time spent on Discharge including face to face service:  35 minutes    Electronically signed by Hung Connolly MD, 07/17/25, 12:47 PM EDT.

## 2025-07-17 NOTE — PLAN OF CARE
Goal Outcome Evaluation:              Outcome Evaluation: alert and oriented x4. vss on RA. frequently medicated PRN per MAR for c/o abdominal pain. still no BM this shift; dulcolax administered per bowel regimen, up adlib and frequently walking around unit, drinking coffee to promote bm. no new issues/needs at this time.

## 2025-07-17 NOTE — OUTREACH NOTE
Prep Survey      Flowsheet Row Responses   [facility] discharged [facility]? [facility]   Is LACE score < 7 ? No   Eligibility Readm Mgmt   Discharge diagnosis Pancreatitis   Does the patient have one of the following disease processes/diagnoses(primary or secondary)? Other   Does the patient have Home health ordered? No   Is there a DME ordered? No   Prep survey completed? Yes            AFSANEH BORREGO - Registered Nurse

## 2025-07-17 NOTE — PROGRESS NOTES
Transition of Care Pharmacist Note:     Consulted due to lack of prescription insurance coverage in setting of new start Eliquis.     Reviewed eligibility criteria for [employer] Patient Assistance Program for Eliquis.  Pt meets criteria based upon criteria listed on [employer] website.  Application for Eliquis submitted through patient assistance program on 7/17/25.  Pt was provided information and contact number for [employer] to follow up with status of application.  If approved, pt will receive Eliquis free of charge through mail from company.     Contact number for [employer]): [phone].    Pt was provided with 30 day free trial of Eliquis at discharge.  Pt expressed understanding of plan and will follow up with [facility].     Please contact me at [phone] for any further needs or questions.  Thank you!    Shawna Mondragon, PharmD  Transition of Care Pharmacist

## 2025-07-25 ENCOUNTER — READMISSION MANAGEMENT (OUTPATIENT)
Dept: CALL CENTER | Facility: HOSPITAL | Age: 40
End: 2025-07-25

## 2025-07-25 NOTE — OUTREACH NOTE
Medical Week 1 Survey      Flowsheet Row Responses   [facility] patient discharged from? [facility]   Does the patient have one of the following disease processes/diagnoses(primary or secondary)? Other   Week 1 attempt successful? Yes   Call start time 1230   Call end time 1232   Discharge diagnosis Pancreatitis   Person spoke with today (if not patient) and relationship Patient   Meds reviewed with patient/caregiver? Yes   Does the patient have all medications ordered at discharge? Yes   Prescription comments START taking:  apixaban (ELIQUIS)  Start taking on: July 17, 2025  dicyclomine (BENTYL)  ondansetron ODT (ZOFRAN-ODT)  oxyCODONE (ROXICODONE)  pantoprazole (PROTONIX)  polyethylene glycol (MIRALAX)  sucralfate (Carafate)   Is the patient taking all medications as directed (includes completed medication regime)? Yes   Medication comments Needing Refill- Carafate.   Does the patient have a primary care provider?  Yes   Comments regarding PCP 8/20/2025  9:45 AM Arrive by 9:30 AM NEW PATIENT 15 min [facility]STR[facility] Jessie Love APRN   Comments Will schedule an appt with pcp once she has insurance.   Has home health visited the patient within 72 hours of discharge? N/A   Psychosocial issues? No   Did the patient receive a copy of their discharge instructions? Yes   Nursing interventions Reviewed instructions with patient   What is the patient's perception of their health status since discharge? Improving   Is the patient/caregiver able to teach back signs and symptoms related to disease process for when to call PCP? Yes   Is the patient/caregiver able to teach back signs and symptoms related to disease process for when to call 911? Yes   Is the patient/caregiver able to teach back the hierarchy of who to call/visit for symptoms/problems? PCP, Specialist, Home health nurse, Urgent Care, ED, 911 Yes   Week 1 call completed? Yes   Graduated Yes   Would this patient benefit from a  Referral to [facility] Social Work? No   Is the patient interested in additional calls from an ambulatory ? No   Wrap up additional comments Patient reports overall doing well. No other concerns or questions noted.   Call end time 1237            Samantha TORREZ - Registered Nurse

## 2025-07-29 ENCOUNTER — APPOINTMENT (OUTPATIENT)
Dept: CT IMAGING | Facility: HOSPITAL | Age: 40
End: 2025-07-29

## 2025-07-29 ENCOUNTER — HOSPITAL ENCOUNTER (EMERGENCY)
Facility: HOSPITAL | Age: 40
Discharge: OTHER INSTITUTION | End: 2025-07-29
Attending: EMERGENCY MEDICINE

## 2025-07-29 ENCOUNTER — DOCUMENTATION (OUTPATIENT)
Dept: HOSPITALIST | Facility: HOSPITAL | Age: 40
End: 2025-07-29

## 2025-07-29 VITALS
WEIGHT: 126.54 LBS | DIASTOLIC BLOOD PRESSURE: 68 MMHG | RESPIRATION RATE: 16 BRPM | BODY MASS INDEX: 19.18 KG/M2 | SYSTOLIC BLOOD PRESSURE: 114 MMHG | TEMPERATURE: 97.6 F | OXYGEN SATURATION: 98 % | HEART RATE: 47 BPM | HEIGHT: 68 IN

## 2025-07-29 DIAGNOSIS — N39.0 ACUTE URINARY TRACT INFECTION: Primary | ICD-10-CM

## 2025-07-29 DIAGNOSIS — K85.90 ACUTE PANCREATITIS, UNSPECIFIED COMPLICATION STATUS, UNSPECIFIED PANCREATITIS TYPE: ICD-10-CM

## 2025-07-29 LAB
ALBUMIN SERPL-MCNC: 4.5 G/DL (ref 3.5–5.2)
ALBUMIN/GLOB SERPL: 1.8 G/DL
ALP SERPL-CCNC: 83 U/L (ref 39–117)
ALT SERPL W P-5'-P-CCNC: 8 U/L (ref 1–33)
ANION GAP SERPL CALCULATED.3IONS-SCNC: 11.2 MMOL/L (ref 5–15)
AST SERPL-CCNC: 18 U/L (ref 1–32)
BACTERIA UR QL AUTO: ABNORMAL /HPF
BASOPHILS # BLD AUTO: 0.05 10*3/MM3 (ref 0–0.2)
BASOPHILS NFR BLD AUTO: 0.5 % (ref 0–1.5)
BILIRUB SERPL-MCNC: 0.3 MG/DL (ref 0–1.2)
BILIRUB UR QL STRIP: NEGATIVE
BUN SERPL-MCNC: 8.7 MG/DL (ref 6–20)
BUN/CREAT SERPL: 10.2 (ref 7–25)
CALCIUM SPEC-SCNC: 10.5 MG/DL (ref 8.6–10.5)
CHLORIDE SERPL-SCNC: 107 MMOL/L (ref 98–107)
CLARITY UR: ABNORMAL
CO2 SERPL-SCNC: 20.8 MMOL/L (ref 22–29)
COLOR UR: YELLOW
CREAT SERPL-MCNC: 0.85 MG/DL (ref 0.57–1)
DEPRECATED RDW RBC AUTO: 56.8 FL (ref 37–54)
EGFRCR SERPLBLD CKD-EPI 2021: 90.1 ML/MIN/1.73
EOSINOPHIL # BLD AUTO: 0.17 10*3/MM3 (ref 0–0.4)
EOSINOPHIL NFR BLD AUTO: 1.8 % (ref 0.3–6.2)
ERYTHROCYTE [DISTWIDTH] IN BLOOD BY AUTOMATED COUNT: 16 % (ref 12.3–15.4)
GLOBULIN UR ELPH-MCNC: 2.5 GM/DL
GLUCOSE SERPL-MCNC: 95 MG/DL (ref 65–99)
GLUCOSE UR STRIP-MCNC: NEGATIVE MG/DL
HCG INTACT+B SERPL-ACNC: <0.5 MIU/ML
HCT VFR BLD AUTO: 39.9 % (ref 34–46.6)
HGB BLD-MCNC: 12.8 G/DL (ref 12–15.9)
HGB UR QL STRIP.AUTO: NEGATIVE
HOLD SPECIMEN: NORMAL
HOLD SPECIMEN: NORMAL
HYALINE CASTS UR QL AUTO: ABNORMAL /LPF
IMM GRANULOCYTES # BLD AUTO: 0.04 10*3/MM3 (ref 0–0.05)
IMM GRANULOCYTES NFR BLD AUTO: 0.4 % (ref 0–0.5)
KETONES UR QL STRIP: NEGATIVE
LEUKOCYTE ESTERASE UR QL STRIP.AUTO: ABNORMAL
LIPASE SERPL-CCNC: 424 U/L (ref 13–60)
LYMPHOCYTES # BLD AUTO: 1.55 10*3/MM3 (ref 0.7–3.1)
LYMPHOCYTES NFR BLD AUTO: 16 % (ref 19.6–45.3)
MCH RBC QN AUTO: 30.8 PG (ref 26.6–33)
MCHC RBC AUTO-ENTMCNC: 32.1 G/DL (ref 31.5–35.7)
MCV RBC AUTO: 96.1 FL (ref 79–97)
MONOCYTES # BLD AUTO: 0.37 10*3/MM3 (ref 0.1–0.9)
MONOCYTES NFR BLD AUTO: 3.8 % (ref 5–12)
NEUTROPHILS NFR BLD AUTO: 7.49 10*3/MM3 (ref 1.7–7)
NEUTROPHILS NFR BLD AUTO: 77.5 % (ref 42.7–76)
NITRITE UR QL STRIP: NEGATIVE
NRBC BLD AUTO-RTO: 0 /100 WBC (ref 0–0.2)
PH UR STRIP.AUTO: 6 [PH] (ref 5–8)
PLATELET # BLD AUTO: 308 10*3/MM3 (ref 140–450)
PMV BLD AUTO: 9.1 FL (ref 6–12)
POTASSIUM SERPL-SCNC: 3.8 MMOL/L (ref 3.5–5.2)
PROT SERPL-MCNC: 7 G/DL (ref 6–8.5)
PROT UR QL STRIP: NEGATIVE
RBC # BLD AUTO: 4.15 10*6/MM3 (ref 3.77–5.28)
RBC # UR STRIP: ABNORMAL /HPF
REF LAB TEST METHOD: ABNORMAL
SODIUM SERPL-SCNC: 139 MMOL/L (ref 136–145)
SP GR UR STRIP: 1.01 (ref 1–1.03)
SQUAMOUS #/AREA URNS HPF: ABNORMAL /HPF
UROBILINOGEN UR QL STRIP: ABNORMAL
WBC # UR STRIP: ABNORMAL /HPF
WBC NRBC COR # BLD AUTO: 9.67 10*3/MM3 (ref 3.4–10.8)
WHOLE BLOOD HOLD COAG: NORMAL
WHOLE BLOOD HOLD SPECIMEN: NORMAL

## 2025-07-29 PROCEDURE — 25010000002 MORPHINE PER 10 MG: Performed by: NURSE PRACTITIONER

## 2025-07-29 PROCEDURE — 99285 EMERGENCY DEPT VISIT HI MDM: CPT

## 2025-07-29 PROCEDURE — 87086 URINE CULTURE/COLONY COUNT: CPT | Performed by: NURSE PRACTITIONER

## 2025-07-29 PROCEDURE — 83690 ASSAY OF LIPASE: CPT | Performed by: EMERGENCY MEDICINE

## 2025-07-29 PROCEDURE — 96365 THER/PROPH/DIAG IV INF INIT: CPT

## 2025-07-29 PROCEDURE — 74177 CT ABD & PELVIS W/CONTRAST: CPT

## 2025-07-29 PROCEDURE — 85025 COMPLETE CBC W/AUTO DIFF WBC: CPT | Performed by: EMERGENCY MEDICINE

## 2025-07-29 PROCEDURE — 25510000001 IOPAMIDOL PER 1 ML: Performed by: EMERGENCY MEDICINE

## 2025-07-29 PROCEDURE — G0378 HOSPITAL OBSERVATION PER HR: HCPCS

## 2025-07-29 PROCEDURE — 96375 TX/PRO/DX INJ NEW DRUG ADDON: CPT

## 2025-07-29 PROCEDURE — 96376 TX/PRO/DX INJ SAME DRUG ADON: CPT

## 2025-07-29 PROCEDURE — 25010000002 PIPERACILLIN SOD-TAZOBACTAM PER 1 G: Performed by: NURSE PRACTITIONER

## 2025-07-29 PROCEDURE — 25010000002 ONDANSETRON PER 1 MG: Performed by: NURSE PRACTITIONER

## 2025-07-29 PROCEDURE — 25010000002 HYDROMORPHONE 1 MG/ML SOLUTION: Performed by: NURSE PRACTITIONER

## 2025-07-29 PROCEDURE — 84702 CHORIONIC GONADOTROPIN TEST: CPT | Performed by: EMERGENCY MEDICINE

## 2025-07-29 PROCEDURE — 80053 COMPREHEN METABOLIC PANEL: CPT | Performed by: EMERGENCY MEDICINE

## 2025-07-29 PROCEDURE — 25010000002 HYDROMORPHONE 1 MG/ML SOLUTION: Performed by: EMERGENCY MEDICINE

## 2025-07-29 PROCEDURE — 81001 URINALYSIS AUTO W/SCOPE: CPT | Performed by: EMERGENCY MEDICINE

## 2025-07-29 PROCEDURE — 25810000003 SODIUM CHLORIDE 0.9 % SOLUTION: Performed by: NURSE PRACTITIONER

## 2025-07-29 PROCEDURE — 36415 COLL VENOUS BLD VENIPUNCTURE: CPT

## 2025-07-29 RX ORDER — POLYETHYLENE GLYCOL 3350 17 G/17G
17 POWDER, FOR SOLUTION ORAL DAILY PRN
Status: CANCELLED | OUTPATIENT
Start: 2025-07-29

## 2025-07-29 RX ORDER — SODIUM CHLORIDE 0.9 % (FLUSH) 0.9 %
10 SYRINGE (ML) INJECTION AS NEEDED
Status: DISCONTINUED | OUTPATIENT
Start: 2025-07-29 | End: 2025-07-30 | Stop reason: HOSPADM

## 2025-07-29 RX ORDER — ACETAMINOPHEN 160 MG/5ML
650 SOLUTION ORAL EVERY 4 HOURS PRN
Status: CANCELLED | OUTPATIENT
Start: 2025-07-29

## 2025-07-29 RX ORDER — BISACODYL 10 MG
10 SUPPOSITORY, RECTAL RECTAL DAILY PRN
Status: CANCELLED | OUTPATIENT
Start: 2025-07-29

## 2025-07-29 RX ORDER — SODIUM CHLORIDE 0.9 % (FLUSH) 0.9 %
10 SYRINGE (ML) INJECTION EVERY 12 HOURS SCHEDULED
Status: CANCELLED | OUTPATIENT
Start: 2025-07-29

## 2025-07-29 RX ORDER — SODIUM CHLORIDE 9 MG/ML
40 INJECTION, SOLUTION INTRAVENOUS AS NEEDED
Status: CANCELLED | OUTPATIENT
Start: 2025-07-29

## 2025-07-29 RX ORDER — SODIUM CHLORIDE, SODIUM LACTATE, POTASSIUM CHLORIDE, CALCIUM CHLORIDE 600; 310; 30; 20 MG/100ML; MG/100ML; MG/100ML; MG/100ML
150 INJECTION, SOLUTION INTRAVENOUS CONTINUOUS
Status: CANCELLED | OUTPATIENT
Start: 2025-07-29 | End: 2025-07-30

## 2025-07-29 RX ORDER — ONDANSETRON 2 MG/ML
4 INJECTION INTRAMUSCULAR; INTRAVENOUS EVERY 6 HOURS PRN
Status: CANCELLED | OUTPATIENT
Start: 2025-07-29

## 2025-07-29 RX ORDER — ONDANSETRON 2 MG/ML
4 INJECTION INTRAMUSCULAR; INTRAVENOUS ONCE
Status: COMPLETED | OUTPATIENT
Start: 2025-07-29 | End: 2025-07-29

## 2025-07-29 RX ORDER — AMOXICILLIN 250 MG
2 CAPSULE ORAL 2 TIMES DAILY PRN
Status: CANCELLED | OUTPATIENT
Start: 2025-07-29

## 2025-07-29 RX ORDER — SODIUM CHLORIDE 0.9 % (FLUSH) 0.9 %
10 SYRINGE (ML) INJECTION AS NEEDED
Status: CANCELLED | OUTPATIENT
Start: 2025-07-29

## 2025-07-29 RX ORDER — IOPAMIDOL 755 MG/ML
100 INJECTION, SOLUTION INTRAVASCULAR
Status: COMPLETED | OUTPATIENT
Start: 2025-07-29 | End: 2025-07-29

## 2025-07-29 RX ORDER — ONDANSETRON 4 MG/1
4 TABLET, ORALLY DISINTEGRATING ORAL EVERY 6 HOURS PRN
Status: CANCELLED | OUTPATIENT
Start: 2025-07-29

## 2025-07-29 RX ORDER — ACETAMINOPHEN 325 MG/1
650 TABLET ORAL EVERY 4 HOURS PRN
Status: CANCELLED | OUTPATIENT
Start: 2025-07-29

## 2025-07-29 RX ORDER — DICYCLOMINE HCL 20 MG
20 TABLET ORAL ONCE
Status: COMPLETED | OUTPATIENT
Start: 2025-07-29 | End: 2025-07-29

## 2025-07-29 RX ORDER — HYDROCODONE BITARTRATE AND ACETAMINOPHEN 5; 325 MG/1; MG/1
1 TABLET ORAL EVERY 6 HOURS PRN
Refills: 0 | Status: CANCELLED | OUTPATIENT
Start: 2025-07-29 | End: 2025-08-03

## 2025-07-29 RX ORDER — BISACODYL 5 MG/1
5 TABLET, DELAYED RELEASE ORAL DAILY PRN
Status: CANCELLED | OUTPATIENT
Start: 2025-07-29

## 2025-07-29 RX ORDER — ACETAMINOPHEN 650 MG/1
650 SUPPOSITORY RECTAL EVERY 4 HOURS PRN
Status: CANCELLED | OUTPATIENT
Start: 2025-07-29

## 2025-07-29 RX ORDER — SUCRALFATE 1 G/1
1 TABLET ORAL ONCE
Status: COMPLETED | OUTPATIENT
Start: 2025-07-29 | End: 2025-07-29

## 2025-07-29 RX ORDER — HYDROMORPHONE HYDROCHLORIDE 2 MG/1
2 TABLET ORAL EVERY 6 HOURS PRN
Refills: 0 | Status: CANCELLED | OUTPATIENT
Start: 2025-07-29 | End: 2025-08-03

## 2025-07-29 RX ADMIN — APIXABAN 5 MG: 5 TABLET, FILM COATED ORAL at 20:54

## 2025-07-29 RX ADMIN — Medication 10 ML: at 18:09

## 2025-07-29 RX ADMIN — ONDANSETRON 4 MG: 2 INJECTION, SOLUTION INTRAMUSCULAR; INTRAVENOUS at 18:08

## 2025-07-29 RX ADMIN — DICYCLOMINE HYDROCHLORIDE 20 MG: 20 TABLET ORAL at 20:54

## 2025-07-29 RX ADMIN — ONDANSETRON 4 MG: 2 INJECTION, SOLUTION INTRAMUSCULAR; INTRAVENOUS at 14:26

## 2025-07-29 RX ADMIN — SODIUM CHLORIDE 1000 ML: 9 INJECTION, SOLUTION INTRAVENOUS at 15:25

## 2025-07-29 RX ADMIN — PIPERACILLIN AND TAZOBACTAM 3.38 G: 3; .375 INJECTION, POWDER, FOR SOLUTION INTRAVENOUS at 14:47

## 2025-07-29 RX ADMIN — Medication 10 ML: at 18:11

## 2025-07-29 RX ADMIN — MORPHINE SULFATE 4 MG: 4 INJECTION, SOLUTION INTRAMUSCULAR; INTRAVENOUS at 14:27

## 2025-07-29 RX ADMIN — IOPAMIDOL 80 ML: 755 INJECTION, SOLUTION INTRAVENOUS at 13:31

## 2025-07-29 RX ADMIN — HYDROMORPHONE HYDROCHLORIDE 1 MG: 1 INJECTION, SOLUTION INTRAMUSCULAR; INTRAVENOUS; SUBCUTANEOUS at 18:09

## 2025-07-29 RX ADMIN — SODIUM CHLORIDE 1000 ML: 9 INJECTION, SOLUTION INTRAVENOUS at 13:12

## 2025-07-29 RX ADMIN — SUCRALFATE 1 G: 1 TABLET ORAL at 20:54

## 2025-07-29 RX ADMIN — HYDROMORPHONE HYDROCHLORIDE 1 MG: 1 INJECTION, SOLUTION INTRAMUSCULAR; INTRAVENOUS; SUBCUTANEOUS at 22:23

## 2025-07-29 NOTE — ED PROVIDER NOTES
Time: 12:48 PM EDT  Date of encounter:  7/29/2025  Independent Historian/Clinical History and Information was obtained by:   Patient    History is limited by: N/A    Chief Complaint: Epigastric and mid abdominal pain      History of Present Illness:  Patient is a 40 y.o. year old female who presents to the emergency department for evaluation of epigastric and mid abdominal pain.  Patient was just discharged from the hospital for 5 days ago for pancreatitis.  She states that 2 days ago she started having epigastric and mid abdominal pain.  According to the history I have reviewed this is alcohol induced pancreatitis.  She also reports nausea, denies vomiting.      Patient Care Team  Primary Care Provider: Provider, No Known    Past Medical History:     Allergies   Allergen Reactions    Sulfa Antibiotics Anaphylaxis     Past Medical History:   Diagnosis Date    Anemia 2003    1st pregnancy    Bipolar disorder 2000    Depression     Hepatitis C     finished tx 2021    Pancreatitis     PONV (postoperative nausea and vomiting) 2004    Substance abuse 2007    Varicella      Past Surgical History:   Procedure Laterality Date    KNEE SURGERY Left     2 surgeries    WISDOM TOOTH EXTRACTION  2007     Family History   Problem Relation Age of Onset    Breast cancer Paternal Aunt     Breast cancer Paternal Aunt        Home Medications:  Prior to Admission medications    Medication Sig Start Date End Date Taking? Authorizing Provider   albuterol sulfate  (90 Base) MCG/ACT inhaler Inhale 2 puffs Every 4 (Four) Hours As Needed for Wheezing or Shortness of Air. 5/24/25   Provider, MD Joleen   apixaban (ELIQUIS) 5 MG tablet tablet Take 2 tablets by mouth 2 (Two) Times a Day for 5 days, THEN 1 tablet 2 (Two) Times a Day for 25 days. Indications: DVT/PE (active thrombosis) 7/17/25 8/16/25  Hung Webster MD   dicyclomine (BENTYL) 10 MG capsule Take 1 capsule by mouth 4 (Four) Times a Day As Needed for Abdominal  Cramping for up to 15 days. 7/17/25 8/1/25  Hung Webster MD   ondansetron ODT (ZOFRAN-ODT) 4 MG disintegrating tablet Take 1 tablet by mouth Every 6 (Six) Hours As Needed for Nausea or Vomiting. 7/17/25   Hung Webster MD   pantoprazole (PROTONIX) 40 MG EC tablet Take 1 tablet by mouth Daily. 7/17/25   Hung Webster MD   polyethylene glycol (MIRALAX) 17 GM/SCOOP powder Take 17 g by mouth Daily. 7/17/25   Hung Webster MD   sucralfate (Carafate) 1 GM/10ML suspension Take 10 mL by mouth 4 (Four) Times a Day for 15 days. 7/17/25 8/2/25  Hung Webster MD        Social History:   Social History     Tobacco Use    Smoking status: Heavy Smoker     Current packs/day: 0.50     Average packs/day: 0.5 packs/day for 20.6 years (10.3 ttl pk-yrs)     Types: Cigarettes     Start date: 2005     Passive exposure: Current    Smokeless tobacco: Never    Tobacco comments:     Workin on quittting   Vaping Use    Vaping status: Former    Quit date: 6/13/2022    Substances: Nicotine   Substance Use Topics    Alcohol use: Not Currently    Drug use: Not Currently     Frequency: 3.0 times per week     Types: Marijuana     Comment: current         Review of Systems:  Review of Systems   Constitutional:  Negative for chills and fever.   HENT:  Negative for congestion, rhinorrhea and sore throat.    Eyes:  Negative for pain and visual disturbance.   Respiratory:  Negative for apnea, cough, chest tightness and shortness of breath.    Cardiovascular:  Negative for chest pain and palpitations.   Gastrointestinal:  Positive for abdominal pain and nausea. Negative for diarrhea and vomiting.   Genitourinary:  Negative for difficulty urinating and dysuria.   Musculoskeletal:  Negative for joint swelling and myalgias.   Skin:  Negative for color change.   Neurological:  Negative for seizures and headaches.   Psychiatric/Behavioral: Negative.     All other systems reviewed and are negative.       Physical Exam:  /68  "(BP Location: Left arm, Patient Position: Sitting)   Pulse (!) 47   Temp 97.6 °F (36.4 °C) (Oral)   Resp 16   Ht 172.7 cm (68\")   Wt 57.4 kg (126 lb 8.7 oz)   LMP 06/25/2025   SpO2 98%   BMI 19.24 kg/m²     Physical Exam  Vitals and nursing note reviewed.   Constitutional:       General: She is not in acute distress.     Appearance: Normal appearance. She is not toxic-appearing.   HENT:      Head: Normocephalic and atraumatic.      Jaw: There is normal jaw occlusion.   Eyes:      General: Lids are normal.      Extraocular Movements: Extraocular movements intact.      Conjunctiva/sclera: Conjunctivae normal.      Pupils: Pupils are equal, round, and reactive to light.   Cardiovascular:      Rate and Rhythm: Normal rate and regular rhythm.      Pulses: Normal pulses.      Heart sounds: Normal heart sounds.   Pulmonary:      Effort: Pulmonary effort is normal. No respiratory distress.      Breath sounds: Normal breath sounds. No wheezing or rhonchi.   Abdominal:      General: Abdomen is flat. Bowel sounds are normal.      Palpations: Abdomen is soft.      Tenderness: There is abdominal tenderness in the epigastric area and periumbilical area. There is no guarding or rebound. Negative signs include Nguyen's sign, Rovsing's sign, McBurney's sign, psoas sign and obturator sign.      Hernia: No hernia is present.   Musculoskeletal:         General: Normal range of motion.      Cervical back: Normal range of motion and neck supple.      Right lower leg: No edema.      Left lower leg: No edema.   Skin:     General: Skin is warm and dry.   Neurological:      Mental Status: She is alert and oriented to person, place, and time. Mental status is at baseline.   Psychiatric:         Mood and Affect: Mood normal.                    Medical Decision Making:      Comorbidities that affect care:    Substance Abuse    External Notes reviewed:    Hospital Discharge Summary: Hospital discharge summary dated 7/17/2025 for acute " pancreatitis.      The following orders were placed and all results were independently analyzed by me:  Orders Placed This Encounter   Procedures    Urine Culture - Urine,    CT Abdomen Pelvis With Contrast    [place] Draw    Comprehensive Metabolic Panel    Lipase    Urinalysis With Microscopic If Indicated (No Culture) - Urine, Clean Catch    hCG, Quantitative, Pregnancy    CBC Auto Differential    Urinalysis, Microscopic Only - Urine, Clean Catch    Undress & Gown    CBC & Differential    Green Top (Gel)    Lavender Top    Gold Top - SST    Light Blue Top       Medications Given in the Emergency Department:  Medications   sodium chloride 0.9 % bolus 1,000 mL (0 mL Intravenous Stopped 7/29/25 1555)   iopamidol (ISOVUE-370) 76 % injection 100 mL (80 mL Intravenous Given 7/29/25 1331)   piperacillin-tazobactam (ZOSYN) IVPB 3.375 g IVPB in 100 mL NS (VTB) (0 g Intravenous Stopped 7/29/25 1525)   sodium chloride 0.9 % bolus 1,000 mL (0 mL Intravenous Stopped 7/29/25 1646)   morphine injection 4 mg (4 mg Intravenous Given 7/29/25 1427)   ondansetron (ZOFRAN) injection 4 mg (4 mg Intravenous Given 7/29/25 1426)   HYDROmorphone (DILAUDID) injection 1 mg (1 mg Intravenous Given 7/29/25 1809)   ondansetron (ZOFRAN) injection 4 mg (4 mg Intravenous Given 7/29/25 1808)   apixaban (ELIQUIS) tablet 5 mg (5 mg Oral Given 7/29/25 2054)   sucralfate (CARAFATE) tablet 1 g (1 g Oral Given 7/29/25 2054)   dicyclomine (BENTYL) tablet 20 mg (20 mg Oral Given 7/29/25 2054)   HYDROmorphone (DILAUDID) injection 1 mg (1 mg Intravenous Given 7/29/25 2223)        ED Course:    ED Course as of 08/01/25 2144 Tue Jul 29, 2025 1650 Report given to Dr. Crespo at [facility].  He is gastroenterology [TB]   1726 Report given to hospitalist at [facility] Ms. Gross. [TB]      ED Course User Index  [TB] Ibis Velasquez APRN       Labs:    Lab Results (last 24 hours)       ** No results found for the last 24 hours. **             Imaging:    No Radiology  Exams Resulted Within Past 24 Hours        Differential Diagnosis and Discussion:    Abdominal Pain: Based on the patient's signs and symptoms, I considered abdominal aortic aneurysm, small bowel obstruction, pancreatitis, acute cholecystitis, acute appendecitis, peptic ulcer disease, gastritis, colitis, endocrine disorders, irritable bowel syndrome and other differential diagnosis an etiology of the patient's abdominal pain.    PROCEDURES:    Labs were collected in the emergency department and all labs were reviewed and interpreted by me.  CT scan was performed in the emergency department and the CT scan radiology impression was interpreted by me.    No orders to display       Procedures    MDM     Amount and/or Complexity of Data Reviewed  Clinical lab tests: reviewed  Tests in the radiology section of CPT®: reviewed    The patient is resting somewhat comfortably after receiving pain medication and she is alert and in no distress at this time.  Exam shows tenderness to epigastric and mid abdominal area.  CT scan was performed and shows acute pancreatitis.  The history, exam, diagnostic testing and current condition do not suggest acute appendicitis, bowel obstruction, acute cholecystitis, bowel perforation, major gastrointestinal bleeding, severe diverticulitis, abdominal aortic aneurysm, mesenteric ischemia, volvulus, sepsis.  Report was called to Dr Sherron ROLON and Ms Renato SALGADO at [facility] who is the accepting hospitalist for admission..                  Patient Care Considerations:    SEPSIS was considered but is NOT present in the emergency department as SIRS criteria is not present.      Consultants/Shared Management Plan:    Hospitalist: I have discussed the case with Dr. Namita Soliz who agrees to accept the patient for admission.  SHARED VISIT: I have discussed the case with my supervising physician, Dr. Cardenas who states agreement with plan. The substantive portion of the medical decision was made by the  attesting physician who made or approve the management plan and will take responsibility for the patient.  Clinical findings were discussed and ultimate disposition was made in consult with supervising physician.    Social Determinants of Health:    Patient is independent, reliable, and has access to care.       Disposition and Care Coordination:    Transferred: Through independent evaluation of the patient's history, physical, and imperical data, the patient meets criteria to be transferred to another hospital for evaluation/admission.        Final diagnoses:   Acute urinary tract infection   Acute pancreatitis, unspecified complication status, unspecified pancreatitis type        ED Disposition       ED Disposition   Transfer to Another Facility     Condition   --    Comment                    This medical record created using voice recognition software.             Ibis Velasquez, APRN  08/01/25 [id number]

## 2025-07-29 NOTE — ED PROVIDER NOTES
"SHARED VISIT ATTESTATION:    This visit was performed by myself and an APC.  I personally approved the management plan/medical decision making and take responsibility for the patient management.      SHARED VISIT NOTE:    Patient is 40 y.o. year old female that presents to the ED for evaluation of abdominal pain.     Physical Exam    ED Course:    /78 (BP Location: Right arm, Patient Position: Sitting)   Pulse 84   Temp 98.2 °F (36.8 °C) (Oral)   Resp 18   Ht 172.7 cm (68\")   Wt 57.4 kg (126 lb 8.7 oz)   LMP 06/25/2025   SpO2 100%   BMI 19.24 kg/m²       The following orders were placed and all results were independently analyzed by me:  Orders Placed This Encounter   Procedures    CT Abdomen Pelvis With Contrast    [place] Draw    Comprehensive Metabolic Panel    Lipase    Urinalysis With Microscopic If Indicated (No Culture) - Urine, Clean Catch    hCG, Quantitative, Pregnancy    CBC Auto Differential    Urinalysis, Microscopic Only - Urine, Clean Catch    Urinalysis With Culture If Indicated - Urine, Clean Catch    NPO Diet NPO Type: Strict NPO    Undress & Gown    Insert Peripheral IV    Insert Peripheral IV    CBC & Differential    Green Top (Gel)    Lavender Top    Gold Top - SST    Light Blue Top       Medications Given in the Emergency Department:  Medications   sodium chloride 0.9 % flush 10 mL (has no administration in time range)   sodium chloride 0.9 % flush 10 mL (has no administration in time range)   sodium chloride 0.9 % bolus 1,000 mL (1,000 mL Intravenous New Bag 7/29/25 1312)   iopamidol (ISOVUE-370) 76 % injection 100 mL (80 mL Intravenous Given 7/29/25 1331)        ED Course:    ED Course as of 07/30/25 0742   Tue Jul 29, 2025   1650 Report given to Dr. Crespo at [facility].  He is gastroenterology [TB]   1726 Report given to hospitalist at [facility] Ms. Gross. [TB]      ED Course User Index  [TB] Ibis Velasquez APRN       Labs:    Lab Results (last 24 hours)       Procedure Component " Value Units Date/Time    CBC & Differential [[id number]]  (Abnormal) Collected: 07/29/25 1238    Specimen: Blood Updated: 07/29/25 1250    Narrative:      The following orders were created for panel order CBC & Differential.  Procedure                               Abnormality         Status                     ---------                               -----------         ------                     CBC Auto Differential[[id number]]        Abnormal            Final result                 Please view results for these tests on the individual orders.    Comprehensive Metabolic Panel [[id number]]  (Abnormal) Collected: 07/29/25 1238    Specimen: Blood Updated: 07/29/25 1308     Glucose 95 mg/dL      BUN 8.7 mg/dL      Creatinine 0.85 mg/dL      Sodium 139 mmol/L      Potassium 3.8 mmol/L      Comment: Slight hemolysis detected by analyzer. Result may be falsely elevated.        Chloride 107 mmol/L      CO2 20.8 mmol/L      Calcium 10.5 mg/dL      Total Protein 7.0 g/dL      Albumin 4.5 g/dL      ALT (SGPT) 8 U/L      AST (SGOT) 18 U/L      Alkaline Phosphatase 83 U/L      Total Bilirubin 0.3 mg/dL      Globulin 2.5 gm/dL      A/G Ratio 1.8 g/dL      BUN/Creatinine Ratio 10.2     Anion Gap 11.2 mmol/L      eGFR 90.1 mL/min/1.73     Narrative:      GFR Categories in Chronic Kidney Disease (CKD)              GFR Category          GFR (mL/min/1.73)    Interpretation  G1                    90 or greater        Normal or high (1)  G2                    60-89                Mild decrease (1)  G3a                   45-59                Mild to moderate decrease  G3b                   30-44                Moderate to severe decrease  G4                    15-29                Severe decrease  G5                    14 or less           Kidney failure    (1)In the absence of evidence of kidney disease, neither GFR category G1 or G2 fulfill the criteria for CKD.    eGFR calculation 2021 CKD-EPI creatinine equation, which does not  include race as a factor    Lipase [[id number]]  (Abnormal) Collected: 07/29/25 1238    Specimen: Blood Updated: 07/29/25 1315     Lipase 424 U/L     hCG, Quantitative, Pregnancy [[id number]] Collected: 07/29/25 1238    Specimen: Blood Updated: 07/29/25 1317     HCG Quantitative <0.50 mIU/mL     Narrative:      HCG Ranges by Gestational Age    Females - non-pregnant premenopausal   </= 1mIU/mL HCG  Females - postmenopausal               </= 7mIU/mL HCG    3 Weeks       5.4   -      72 mIU/mL  4 Weeks      10.2   -     708 mIU/mL  5 Weeks       217   -   8,245 mIU/mL  6 Weeks       152   -  32,177 mIU/mL  7 Weeks     4,059   - 153,767 mIU/mL  8 Weeks    31,366   - 149,094 mIU/mL  9 Weeks    59,109   - 135,901 mIU/mL  10 Weeks   44,186   - 170,409 mIU/mL  12 Weeks   27,107   - 201,615 mIU/mL  14 Weeks   24,302   -  93,646 mIU/mL  15 Weeks   12,540   -  69,747 mIU/mL  16 Weeks    8,904   -  55,332 mIU/mL  17 Weeks    8,240   -  51,793 mIU/mL  18 Weeks    9,649   -  55,271 mIU/mL      CBC Auto Differential [[id number]]  (Abnormal) Collected: 07/29/25 1238    Specimen: Blood Updated: 07/29/25 1250     WBC 9.67 10*3/mm3      RBC 4.15 10*6/mm3      Hemoglobin 12.8 g/dL      Hematocrit 39.9 %      MCV 96.1 fL      MCH 30.8 pg      MCHC 32.1 g/dL      RDW 16.0 %      RDW-SD 56.8 fl      MPV 9.1 fL      Platelets 308 10*3/mm3      Neutrophil % 77.5 %      Lymphocyte % 16.0 %      Monocyte % 3.8 %      Eosinophil % 1.8 %      Basophil % 0.5 %      Immature Grans % 0.4 %      Neutrophils, Absolute 7.49 10*3/mm3      Lymphocytes, Absolute 1.55 10*3/mm3      Monocytes, Absolute 0.37 10*3/mm3      Eosinophils, Absolute 0.17 10*3/mm3      Basophils, Absolute 0.05 10*3/mm3      Immature Grans, Absolute 0.04 10*3/mm3      nRBC 0.0 /100 WBC     Urinalysis With Microscopic If Indicated (No Culture) - Urine, Clean Catch [[id number]]  (Abnormal) Collected: 07/29/25 1312    Specimen: Urine, Clean Catch Updated: 07/29/25 1324     Color, UA  Yellow     Appearance, UA Cloudy     pH, UA 6.0     Specific Gravity, UA 1.013     Glucose, UA Negative     Ketones, UA Negative     Bilirubin, UA Negative     Blood, UA Negative     Protein, UA Negative     Leuk Esterase, UA Moderate (2+)     Nitrite, UA Negative     Urobilinogen, UA 0.2 E.U./dL    Urinalysis, Microscopic Only - Urine, Clean Catch [[id number]]  (Abnormal) Collected: 07/29/25 1312    Specimen: Urine, Clean Catch Updated: 07/29/25 1324     RBC, UA 0-2 /HPF      WBC, UA 21-50 /HPF      Bacteria, UA 4+ /HPF      Squamous Epithelial Cells, UA 21-30 /HPF      Hyaline Casts, UA None Seen /LPF      Methodology Automated Microscopy             Imaging:    CT Abdomen Pelvis With Contrast  Result Date: 7/29/2025  CT ABDOMEN PELVIS W CONTRAST Date of Exam: 7/29/2025 1:26 PM EDT Indication: Recently hospitalized for pancreatitis, abdominal pain x 2 days. Comparison: 7/10/2025, 7/14/2025 Technique: Axial CT images were obtained of the abdomen and pelvis after the uneventful intravenous administration of iodinated contrast. Reconstructed coronal and sagittal images were also obtained. Automated exposure control and iterative construction methods were used. Findings: Visualized lung bases are clear. Spleen is borderline prominent in size but otherwise unremarkable. There is inflammatory fat stranding surrounding the pancreas again noted inflammatory stranding is greatest at the pancreatic head. Previously seen peripancreatic fluid collections have decreased in size. The largest remaining collection is adjacent to the pancreatic tail measuring 1.6 x 0.9 cm. No evidence of pancreatic necrosis or abscess. No evidence of pancreatic mass. Stable dilatation of the pancreatic duct in the pancreatic head.  Pancreatic duct is otherwise normal. Previously seen thrombus in the main portal vein at the confluence of the splenic vein and SMV has resolved. There is relative hypodensity throughout the liver suggesting steatosis.  The liver is prominent in size measuring 20 cm in superior-inferior dimension. Probable more focal fatty deposition along the falciform ligament. No suspicious hepatic lesion identified. The adrenals and kidneys have a normal appearance. Gallbladder is unremarkable. Common bile duct appears normal caliber. There is no evidence of bowel obstruction, free air or pneumatosis. There is wall thickening involving the distal stomach and duodenum adjacent to the pancreas likely secondary inflammation related to the pancreas. Small amount of free fluid in the pelvis. No pericolonic inflammatory change identified. The appendix is normal. There is a new 3.7 cm cyst in the left adnexa, with some layering density suggesting hemorrhagic cyst, measuring 3.7 cm. Uterus is unremarkable. Urinary bladder is unremarkable. No gross adenopathy identified within the abdomen or pelvis. There is normal opacification of the mesenteric vessels. Abdominal aorta and iliac arteries are normal caliber. There is a small fat-containing umbilical hernia. Visualized osseous structures are unremarkable for age.     Impression: 1.Findings of acute pancreatitis again noted. Previously seen peripancreatic fluid collections have decreased in size. No evidence of pancreatic necrosis or abscess. 2.Interval resolution of previously seen thrombus in the main portal vein. 3.Hepatomegaly and hepatic steatosis. 4.New 3.7 cm left adnexal cyst with layering density suggesting hemorrhagic cyst. 5.Additional findings as given above. Electronically Signed: Lenard Wilson MD  7/29/2025 1:47 PM EDT  Workstation ID: [id number]      MDM:    Procedures    Labs were collected in the emergency department and all labs were reviewed and interpreted by me.  CT scan was performed in the emergency department and the CT scan radiology impression was interpreted by me.                     Poli Cardenas MD  14:01 EDT  07/29/25         Poli Cardenas MD  07/29/25 [id number]        Poli Cardenas MD  07/30/25 0787

## 2025-07-31 LAB — BACTERIA SPEC AEROBE CULT: NO GROWTH
